# Patient Record
Sex: FEMALE | Race: WHITE | Employment: FULL TIME
[De-identification: names, ages, dates, MRNs, and addresses within clinical notes are randomized per-mention and may not be internally consistent; named-entity substitution may affect disease eponyms.]

---

## 2017-05-26 ENCOUNTER — HEALTH MAINTENANCE LETTER (OUTPATIENT)
Age: 37
End: 2017-05-26

## 2017-10-02 ASSESSMENT — ANXIETY QUESTIONNAIRES
7. FEELING AFRAID AS IF SOMETHING AWFUL MIGHT HAPPEN: NOT AT ALL
GAD7 TOTAL SCORE: 0
2. NOT BEING ABLE TO STOP OR CONTROL WORRYING: NOT AT ALL
5. BEING SO RESTLESS THAT IT IS HARD TO SIT STILL: NOT AT ALL
1. FEELING NERVOUS, ANXIOUS, OR ON EDGE: NOT AT ALL
6. BECOMING EASILY ANNOYED OR IRRITABLE: NOT AT ALL
GAD7 TOTAL SCORE: 0
4. TROUBLE RELAXING: NOT AT ALL
GAD7 TOTAL SCORE: 0
3. WORRYING TOO MUCH ABOUT DIFFERENT THINGS: NOT AT ALL
7. FEELING AFRAID AS IF SOMETHING AWFUL MIGHT HAPPEN: NOT AT ALL

## 2017-10-03 ENCOUNTER — OFFICE VISIT (OUTPATIENT)
Dept: OBGYN | Facility: CLINIC | Age: 37
End: 2017-10-03
Payer: COMMERCIAL

## 2017-10-03 VITALS
BODY MASS INDEX: 28.56 KG/M2 | HEIGHT: 62 IN | HEART RATE: 101 BPM | DIASTOLIC BLOOD PRESSURE: 95 MMHG | SYSTOLIC BLOOD PRESSURE: 158 MMHG | WEIGHT: 155.2 LBS

## 2017-10-03 DIAGNOSIS — N97.9 FEMALE INFERTILITY: ICD-10-CM

## 2017-10-03 DIAGNOSIS — Z12.4 CERVICAL CANCER SCREENING: Primary | ICD-10-CM

## 2017-10-03 DIAGNOSIS — I10 HYPERTENSION, UNSPECIFIED TYPE: ICD-10-CM

## 2017-10-03 PROCEDURE — 83001 ASSAY OF GONADOTROPIN (FSH): CPT | Performed by: STUDENT IN AN ORGANIZED HEALTH CARE EDUCATION/TRAINING PROGRAM

## 2017-10-03 PROCEDURE — G0123 SCREEN CERV/VAG THIN LAYER: HCPCS | Performed by: STUDENT IN AN ORGANIZED HEALTH CARE EDUCATION/TRAINING PROGRAM

## 2017-10-03 PROCEDURE — 87624 HPV HI-RISK TYP POOLED RSLT: CPT | Performed by: STUDENT IN AN ORGANIZED HEALTH CARE EDUCATION/TRAINING PROGRAM

## 2017-10-03 PROCEDURE — 99213 OFFICE O/P EST LOW 20 MIN: CPT | Mod: 25

## 2017-10-03 PROCEDURE — G0145 SCR C/V CYTO,THINLAYER,RESCR: HCPCS | Performed by: STUDENT IN AN ORGANIZED HEALTH CARE EDUCATION/TRAINING PROGRAM

## 2017-10-03 ASSESSMENT — PAIN SCALES - GENERAL: PAINLEVEL: NO PAIN (0)

## 2017-10-03 ASSESSMENT — ANXIETY QUESTIONNAIRES: GAD7 TOTAL SCORE: 0

## 2017-10-03 NOTE — PROGRESS NOTES
"[facility]  Annual Exam    HPI:    Rona Krishnamurthy is a 37 year old G0, here for well woman exam.  She would like to discuss fertility and future pregnancy. She has been having unprotected intercourse with her  for the last 5 years with timed intercourse for the last 5-6 months. She has been using an rand on her phone called \"Ovia\" to time ovulation. She has not tried ovulation predictor kits. She has no other concerns today.     GYN History  - LMP: Patient's last menstrual period was 09/23/2017.  - Menses: Regular monthly cycles  - Pap Smears: Unknown, patient does not remember    No results found for: PAP  - Contraception: none  - Sexual Activity: with her   - Hx STIs: genital wart    OBHx  G0    PMHx: Denies  PSHx: Removal of fibroadenoma from breast  Meds: L-theanine   Allergies:  NKDA    SocHx: Reports THC usage, no tobacco usage. She works as a  for [employer]    ROS: 10-Point ROS negative except as noted in HPI    Preventative Health  Feels safe at home  Feelings of depression: Denies  Seat belt usage: yes  Exercise: trying to exercise more    Physical Exam  BP (!) 158/95  Pulse 101  Ht 1.575 m (5' 2\")  Wt 70.4 kg (155 lb 3.2 oz)  LMP 09/23/2017  Breastfeeding? No  BMI 28.39 kg/m2  Gen: Well-appearing, NAD  HEENT: Normocephalic, atraumatic  Neck: Thyroid is not enlarged, no appreciable masses palpated. Non-tender  CV:  RRR, no m/r/g auscultated  Pulm: CTAB, no w/r/r auscultated  Abd: Soft, non-tender, non-distended  Ext: No LE edema, extremities warm and well perfused    Breast: Symmetric, no nipple discharge or retraction, no axillary lymphadenopathy, no palpable nodules or masses bilaterally    Pelvic:  Normal appearing external female genitalia. Normal hair distribution. Vagina is without lesions. Scant discharge. Cervix nulliparous, no lesions, no cervical motion tenderness. Uterus is small, mobile, non-tender. No adnexal tenderness or masses      --Ideal BMI: " 18.5-24.9  Current BMI: Body mass index is 28.39 kg/(m^2).  --Underweight = <18.5  --Normal weight = 18.5-24.9  --Overweight = 25-29.9  --Obesity = >30    Assessment/Plan  Rona Krishnamurthy is a 37 year old G0 female here for annual exam, infertility and HTN.     Infertility:  - Ordered AMH, Day 3 FSH and estradiol  - Ordered HSG  - Referral to [facility], patient would like to go to [facility] in [place]  - Discussed with patient day 1 of her cycle is the first day of bleeding and she should have her labs drawn on day 3. She should also call the clinic on day 1 to schedule her HSG  - Encouraged patient to use ovulation predictor kits while waiting for evaluation with [facility]    Cervical cancer screening:  - Pap collected today, will contact patient via [employer] with results    HTN:  - patient had two elevated BP in clinic, referral to PCP  - Patient will make appointment with Dr. Todd     Follow Up: as needed    Mónica Jeffery MD,PhD  Ob/Gyn, PGY-2  10/3/2017 3:14 PM    Patient was seen by the resident in [facility].  I reviewed the history & exam.  The patient's assessment and plan were made jointly.    Camila Barrientos MD MPH

## 2017-10-03 NOTE — PATIENT INSTRUCTIONS
Nice to meet you today!  We will contact you with the results from your pap smear.  Please have your FSH and estradiol lab work drawn on day 3 of your next menstrual cycle. Day 1 is the first day of bleeding.   For your HSG, call the clinic on day 1 of your cycle and they will schedule your appropriately.   Please follow up in clinic as needed.

## 2017-10-03 NOTE — MR AVS SNAPSHOT
After Visit Summary   10/3/2017    Rona Krishnamurthy    MRN: [id number]           Patient Information     Date Of Birth          1980        Visit Information        Provider Department      10/3/2017 3:15 PM Mónica Jeffery MD [facility]        Today's Diagnoses     Cervical cancer screening    -  1    Female infertility          Care Instructions    Nice to meet you today!  We will contact you with the results from your pap smear.  Please have your FSH and estradiol lab work drawn on day 3 of your next menstrual cycle. Day 1 is the first day of bleeding.   For your HSG, call the clinic on day 1 of your cycle and they will schedule your appropriately.   Please follow up in clinic as needed.             Follow-ups after your visit        Future tests that were ordered for you today     Open Future Orders        Priority Expected Expires Ordered    XR Hysterosalpingogram Routine 10/3/2017 10/3/2018 10/3/2017            Who to contact     Please call your clinic at [phone] to:    Ask questions about your health    Make or cancel appointments    Discuss your medicines    Learn about your test results    Speak to your doctor   If you have compliments or concerns about an experience at your clinic, or if you wish to file a complaint, please contact [facility] Patient Relations at [phone] or email us at [email or web address]         Additional Information About Your Visit        Footmarkshart Information     Cam[employer] gives you secure access to your electronic health record. If you see a primary care provider, you can also send messages to your care team and make appointments. If you have questions, please call your primary care clinic.  If you do not have a primary care provider, please call [phone] and they will assist you.      Cam[employer] is an electronic gateway that provides easy, online access to your medical records. With  "MyChart, you can request a clinic appointment, read your test results, renew a prescription or communicate with your care team.     To access your existing account, please contact your [facility] or call [phone] for assistance.        Care EveryWhere ID     This is your Care EveryWhere ID. This could be used by other organizations to access your [place] medical records  [id number]        Your Vitals Were     Pulse Height Last Period Breastfeeding? BMI (Body Mass Index)       114 1.575 m (5' 2\") 09/23/2017 No 28.39 kg/m2        Blood Pressure from Last 3 Encounters:   10/03/17 (!) 160/92    Weight from Last 3 Encounters:   10/03/17 70.4 kg (155 lb 3.2 oz)              We Performed the Following     Anti-Mullerian hormone     Estradiol     Follicle Stimulating Hormone     HPV High Risk Types DNA Cervical     Obtaining, preparing and conveyance of cervical or vaginal smear to laboratory.     Pap imaged thin layer screen with HPV - recommended age 30 - 65 years (select HPV order below)        Primary Care Provider    None Specified       No primary provider on file.        Equal Access to Services     BERT ACUNA : Hadeusebio Prabhakar, waaxda luángel, qaybta kaalaliyah forte, kim romero . So [facility] [phone].    ATENCIÓN: Si habla español, tiene a trevizo disposición servicios gratuitos de asistencia lingüística. Llame al [phone].    We comply with applicable federal civil rights laws and [place] laws. We do not discriminate on the basis of race, color, national origin, age, disability, sex, sexual orientation, or gender identity.            Thank you!     Thank you for choosing [facility]  for your care. Our goal is always to provide you with excellent care. Hearing back from our patients is one way we can continue to improve our services. Please take a few minutes to complete the written survey that you may " receive in the mail after your visit with us. Thank you!             Your Updated Medication List - Protect others around you: Learn how to safely use, store and throw away your medicines at www.disposemymeds.org.      Notice  As of 10/3/2017  4:03 PM    You have not been prescribed any medications.

## 2017-10-06 LAB
COPATH REPORT: NORMAL
PAP: NORMAL

## 2017-10-10 LAB
FINAL DIAGNOSIS: NORMAL
HPV HR 12 DNA CVX QL NAA+PROBE: NEGATIVE
HPV16 DNA SPEC QL NAA+PROBE: NEGATIVE
HPV18 DNA SPEC QL NAA+PROBE: NEGATIVE
SPECIMEN DESCRIPTION: NORMAL

## 2017-10-15 ASSESSMENT — ANXIETY QUESTIONNAIRES
6. BECOMING EASILY ANNOYED OR IRRITABLE: NOT AT ALL
4. TROUBLE RELAXING: NOT AT ALL
GAD7 TOTAL SCORE: 2
7. FEELING AFRAID AS IF SOMETHING AWFUL MIGHT HAPPEN: NOT AT ALL
5. BEING SO RESTLESS THAT IT IS HARD TO SIT STILL: NOT AT ALL
3. WORRYING TOO MUCH ABOUT DIFFERENT THINGS: SEVERAL DAYS
2. NOT BEING ABLE TO STOP OR CONTROL WORRYING: NOT AT ALL
7. FEELING AFRAID AS IF SOMETHING AWFUL MIGHT HAPPEN: NOT AT ALL
GAD7 TOTAL SCORE: 2
1. FEELING NERVOUS, ANXIOUS, OR ON EDGE: SEVERAL DAYS
GAD7 TOTAL SCORE: 2

## 2017-10-16 ASSESSMENT — ANXIETY QUESTIONNAIRES: GAD7 TOTAL SCORE: 2

## 2017-10-18 ENCOUNTER — OFFICE VISIT (OUTPATIENT)
Dept: INTERNAL MEDICINE | Facility: CLINIC | Age: 37
End: 2017-10-18
Attending: INTERNAL MEDICINE
Payer: COMMERCIAL

## 2017-10-18 VITALS
HEIGHT: 62 IN | WEIGHT: 153 LBS | SYSTOLIC BLOOD PRESSURE: 121 MMHG | BODY MASS INDEX: 28.16 KG/M2 | DIASTOLIC BLOOD PRESSURE: 86 MMHG

## 2017-10-18 DIAGNOSIS — Z23 NEED FOR IMMUNIZATION AGAINST INFLUENZA: ICD-10-CM

## 2017-10-18 DIAGNOSIS — R03.0 ELEVATED BLOOD PRESSURE READING WITHOUT DIAGNOSIS OF HYPERTENSION: Primary | ICD-10-CM

## 2017-10-18 DIAGNOSIS — N92.6 MISSED PERIODS: ICD-10-CM

## 2017-10-18 LAB
HCG UR QL: NEGATIVE
INTERNAL QC OK POCT: YES

## 2017-10-18 PROCEDURE — 90686 IIV4 VACC NO PRSV 0.5 ML IM: CPT | Mod: ZF

## 2017-10-18 PROCEDURE — 25000128 H RX IP 250 OP 636: Mod: ZF

## 2017-10-18 PROCEDURE — 81025 URINE PREGNANCY TEST: CPT | Mod: ZF | Performed by: INTERNAL MEDICINE

## 2017-10-18 PROCEDURE — 99211 OFF/OP EST MAY X REQ PHY/QHP: CPT | Mod: ZF

## 2017-10-18 PROCEDURE — G0008 ADMIN INFLUENZA VIRUS VAC: HCPCS

## 2017-10-18 PROCEDURE — 90471 IMMUNIZATION ADMIN: CPT | Mod: ZF

## 2017-10-18 ASSESSMENT — PAIN SCALES - GENERAL: PAINLEVEL: NO PAIN (0)

## 2017-10-18 NOTE — NURSING NOTE
Chief Complaint   Patient presents with     Consult   establish care for blood pressure check-  Has been monitoring at home   Lowest 135-82 in am     Over ones 149/90

## 2017-10-18 NOTE — NURSING NOTE
"Injectable Influenza Immunization Documentation    1.  Has the patient received the information for the injectable influenza vaccine? YES     2. Is the patient 6 months of age or older? YES     3. Does the patient have any of the following contraindications?         Severe allergy to eggs? No     Severe allergic reaction to previous influenza vaccines? No   Severe allergy to latex? No       History of Guillain-Wilton syndrome? No     Currently have a temperature greater than 100.4F? No        4.  Severely egg allergic patients should have flu vaccine eligibility assessed by an MD, RN, or pharmacist, and those who received flu vaccine should be observed for 15 min by an MD, RN, Pharmacist, Medical Technician, or member of clinic staff.\": YES    5. Latex-allergic patients should be given latex-free influenza vaccine Yes. Please reference the Vaccine latex table to determine if your clinic s product is latex-containing.       Vaccination given by Tegan Conklin LPN      "

## 2017-10-18 NOTE — LETTER
10/18/2017       RE: Rona Krishnamurthy  [address] [facility] [address]     Dear Colleague,    Thank you for referring your patient, Rona Krishnamurthy, to the [facility]  at [facility]. Please see a copy of my visit note below.    HPI  Patient is here to discuss elevated blood pressure. She states that she was seen for her annual visit recently and was found to be hypertensive.  She is considering pregnancy in the future.   Patient reports that her mom has hypertension. She denies snoring. She has been walking occasionally. She does not smoke tobacco. She reports stress at home.     Review of Systems     Constitutional:  Negative for fever, chills, weight loss, weight gain, fatigue, decreased appetite, night sweats, recent stressors, height gain, height loss, post-operative complications, incisional pain, hallucinations, increased energy, hyperactivity and confused.   HENT:  Negative for ear pain, hearing loss, tinnitus, nosebleeds, trouble swallowing, hoarse voice, mouth sores, sore throat, ear discharge, tooth pain, gum tenderness, taste disturbance, smell disturbance, hearing aid, bleeding gums, dry mouth, sinus pain, sinus congestion and neck mass.    Eyes:  Negative for double vision, pain, redness, eye pain, decreased vision, eye watering, eye bulging, eye dryness, flashing lights, spots, floaters, strabismus, tunnel vision, jaundice and eye irritation.   Respiratory:   Negative for cough, hemoptysis, sputum production, shortness of breath, wheezing, sleep disturbances due to breathing, snores loudly, respiratory pain, dyspnea on exertion, cough disturbing sleep and postural dyspnea.    Cardiovascular:  Negative for chest pain, dyspnea on exertion, palpitations, orthopnea, claudication, leg swelling, fingers/toes turn blue, hypertension, hypotension, syncope, history of heart murmur, chest pain on exertion, chest pain at rest, pacemaker,  few scattered varicosities, leg pain, sleep disturbances due to breathing, tachycardia, light-headedness, exercise intolerance and edema.   Gastrointestinal:  Negative for heartburn, nausea, vomiting, abdominal pain, diarrhea, constipation, blood in stool, melena, rectal pain, bloating, hemorrhoids, bowel incontinence, jaundice, rectal bleeding, coffee ground emesis and change in stool.   Genitourinary:  Negative for bladder incontinence, dysuria, urgency, hematuria, flank pain, vaginal discharge, difficulty urinating, genital sores, dyspareunia, decreased libido, nocturia, voiding less frequently, arousal difficulty, abnormal vaginal bleeding, excessive menstruation, menstrual changes, hot flashes, vaginal dryness and postmenopausal bleeding.   Musculoskeletal:  Negative for myalgias, back pain, joint swelling, arthralgias, stiffness, muscle cramps, neck pain, bone pain, muscle weakness and fracture.   Skin:  Negative for nail changes, itching, poor wound healing, rash, hair changes, skin changes, acne, warts, poor wound healing, scarring, flaky skin, Raynaud's phenomenon, sensitivity to sunlight and skin thickening.   Neurological:  Negative for dizziness, tingling, tremors, speech change, seizures, loss of consciousness, weakness, light-headedness, numbness, headaches, disturbances in coordination, extremity numbness, memory loss, difficulty walking and paralysis.   Endo/Heme:  Negative for anemia, swollen glands and bruises/bleeds easily.   Psychiatric/Behavioral:  Negative for depression, hallucinations, memory loss, decreased concentration, mood swings and panic attacks.    Breast:  Negative for breast discharge, breast mass, breast pain and nipple retraction.   Endocrine:  Negative for altered temperature regulation, polyphagia, polydipsia, unwanted hair growth and change in facial hair.    Current Outpatient Prescriptions   Medication     L-THEANINE PO     doxylamine (UNISOM) 25 MG TABS tablet     No current  "facility-administered medications for this visit.      Past Medical History:   Diagnosis Date     Atopic dermatitis 11/16/2005     Past Surgical History:   Procedure Laterality Date     BREAST SURGERY  06/01/1998    fibroadenoma removal     Family History   Problem Relation Age of Onset     Breast Cancer Other      Aunt - Dad's sister     Breast Cancer Other      Mom's grandma     Prostate Cancer Father      Hypertension Mother      Social History     Social History     Marital status:      Spouse name: Tj Krishnamurthy     Number of children: N/A     Years of education: N/A     Occupational History     Not on file.     Social History Main Topics     Smoking status: Never Smoker     Smokeless tobacco: Never Used     Alcohol use No     Drug use: Yes     Special: Marijuana      Comment: Ocassional     Sexual activity: Yes     Partners: Male     Birth control/ protection: None     Other Topics Concern     Not on file     Social History Narrative    ** Merged History Encounter **            Vitals:    10/18/17 1630 10/18/17 1644 10/18/17 1645   BP: 131/85 128/87 121/86   Weight: 69.4 kg (153 lb)     Height: 1.575 m (5' 2\")         Physical Exam   Constitutional: She is oriented to person, place, and time and well-developed, well-nourished, and in no distress.   HENT:   Head: Normocephalic and atraumatic.   Mouth/Throat: Oropharynx is clear and moist.   Eyes: Conjunctivae are normal. Pupils are equal, round, and reactive to light.   Neck: Normal range of motion.   Cardiovascular: Normal rate and regular rhythm.    Pulmonary/Chest: Effort normal and breath sounds normal.   Musculoskeletal: She exhibits no edema.   Neurological: She is alert and oriented to person, place, and time.   Skin: Skin is warm and dry.   Psychiatric: Mood, memory and judgment normal.   Vitals reviewed.    Assessment and Plan:  Rona was seen today for consult.    Diagnoses and all orders for this visit:    Elevated blood pressure reading " without diagnosis of hypertension. Blood pressure is within acceptable range today. Given patient's blood pressure readings at home, recommend further evaluation with 24 hour blood pressure monitor. Will follow up following the completion of the study. Patient is in agreement with the plan.   -     24 Hour Blood Pressure Monitor - Adult  -     Cancel: Basic Metabolic Panel  -     Cancel: TSH with free T4 reflex  -     Cancel: CBC with Platelets  -     Cancel: Protein albumin urine  -     Cancel: *UA reflex to Microscopic and Culture ([facility])  -     Basic Metabolic Panel; Future  -     TSH with free T4 reflex; Future  -     CBC with Platelets; Future  -     Protein albumin urine; Future  -     *UA reflex to Microscopic and Culture ([facility]); Future    Need for immunization against influenza  -     HC FLU VAC PRESRV FREE QUAD SPLIT VIR 3+YRS IM    Missed periods. Patient is not using contraception. She is concerned about impact of pregnancy on her stress level. She is open to counseling and will look further into mental health coverage with her insurance. Pregnancy test was negative today.   -     hCG qual urine POCT      Total time spent 45  minutes.  More than 50% of the time spent with Ms. Krishnamurthy on counseling / coordinating her care    Again, thank you for allowing me to participate in the care of your patient.      Sincerely,    Torri Todd MD

## 2017-10-18 NOTE — MR AVS SNAPSHOT
After Visit Summary   10/18/2017    Rona Krishnamurthy    MRN: [id number]           Patient Information     Date Of Birth          1980        Visit Information        Provider Department      10/18/2017 4:20 PM Torri Todd MD [facility]         Today's Diagnoses     Elevated blood pressure reading without diagnosis of hypertension    -  1    Need for immunization against influenza        Missed periods           Follow-ups after your visit        Future tests that were ordered for you today     Open Future Orders        Priority Expected Expires Ordered    Basic Metabolic Panel Routine  10/18/2018 10/18/2017    TSH with free T4 reflex Routine  10/18/2018 10/18/2017    CBC with Platelets Routine  10/18/2018 10/18/2017    Protein albumin urine Routine  10/18/2018 10/18/2017    *UA reflex to Microscopic and Culture ([facility]) Routine  10/18/2018 10/18/2017            Who to contact     Please call your clinic at [phone] to:    Ask questions about your health    Make or cancel appointments    Discuss your medicines    Learn about your test results    Speak to your doctor   If you have compliments or concerns about an experience at your clinic, or if you wish to file a complaint, please contact [facility] Patient Relations at [phone] or email us at [email or web address]         Additional Information About Your Visit        MyChart Information     [employer] gives you secure access to your electronic health record. If you see a primary care provider, you can also send messages to your care team and make appointments. If you have questions, please call your primary care clinic.  If you do not have a primary care provider, please call [phone] and they will assist you.      [employer] is an electronic gateway that provides easy, online access to your medical records. With [employer], you can request a clinic appointment,  "read your test results, renew a prescription or communicate with your care team.     To access your existing account, please contact your [facility] or call [phone] for assistance.        Care EveryWhere ID     This is your Care EveryWhere ID. This could be used by other organizations to access your [place] medical records  [id number]        Your Vitals Were     Height Last Period BMI (Body Mass Index)             1.575 m (5' 2\") 09/23/2017 27.98 kg/m2          Blood Pressure from Last 3 Encounters:   10/18/17 121/86   10/03/17 (!) 158/95    Weight from Last 3 Encounters:   10/18/17 69.4 kg (153 lb)   10/03/17 70.4 kg (155 lb 3.2 oz)              We Performed the Following     24 Hour Blood Pressure Monitor - Adult     HC FLU VAC PRESRV FREE QUAD SPLIT VIR 3+YRS IM     hCG qual urine POCT          Today's Medication Changes          These changes are accurate as of: 10/18/17  5:36 PM.  If you have any questions, ask your nurse or doctor.               Stop taking these medicines if you haven't already. Please contact your care team if you have questions.     glycopyrrolate 2 MG Tabs   Stopped by:  Torir Todd MD           ROBINUL-FORTE 2 MG Tabs   Generic drug:  glycopyrrolate   Stopped by:  Torri Todd MD           JOLANTA PO   Stopped by:  oTrri Todd MD                    Primary Care Provider Office Phone # Fax #    Mamadou Canales [phone]       [address] [place], [place] [address]DELORES BLANCHARD [facility] [address]        Equal Access to Services     [facility]: Hadii aad ku hadasho Soomaali, waaxda luqadaha, qaybta kaalmada praneethegamirah, kim romero . So [place] [phone].    ATENCIÓN: Si habla español, tiene a trevizo disposición servicios gratuitos de asistencia lingüística. Llame al [phone].    We comply with applicable federal civil rights laws and [place] laws. We do not discriminate on the basis of race, color, " national origin, age, disability, sex, sexual orientation, or gender identity.            Thank you!     Thank you for choosing [facility]   for your care. Our goal is always to provide you with excellent care. Hearing back from our patients is one way we can continue to improve our services. Please take a few minutes to complete the written survey that you may receive in the mail after your visit with us. Thank you!             Your Updated Medication List - Protect others around you: Learn how to safely use, store and throw away your medicines at www.disposemymeds.org.          This list is accurate as of: 10/18/17  5:36 PM.  Always use your most recent med list.                   Brand Name Dispense Instructions for use Diagnosis    doxylamine 25 MG Tabs tablet    UNISOM     Take 25 mg by mouth At Bedtime        L-THEANINE PO      Take 200 mg by mouth daily

## 2017-10-18 NOTE — PROGRESS NOTES
HPI  Patient is here to discuss elevated blood pressure. She states that she was seen for her annual visit recently and was found to be hypertensive.  She is considering pregnancy in the future.   Patient reports that her mom has hypertension. She denies snoring. She has been walking occasionally. She does not smoke tobacco. She reports stress at home.     Review of Systems     Constitutional:  Negative for fever, chills, weight loss, weight gain, fatigue, decreased appetite, night sweats, recent stressors, height gain, height loss, post-operative complications, incisional pain, hallucinations, increased energy, hyperactivity and confused.   HENT:  Negative for ear pain, hearing loss, tinnitus, nosebleeds, trouble swallowing, hoarse voice, mouth sores, sore throat, ear discharge, tooth pain, gum tenderness, taste disturbance, smell disturbance, hearing aid, bleeding gums, dry mouth, sinus pain, sinus congestion and neck mass.    Eyes:  Negative for double vision, pain, redness, eye pain, decreased vision, eye watering, eye bulging, eye dryness, flashing lights, spots, floaters, strabismus, tunnel vision, jaundice and eye irritation.   Respiratory:   Negative for cough, hemoptysis, sputum production, shortness of breath, wheezing, sleep disturbances due to breathing, snores loudly, respiratory pain, dyspnea on exertion, cough disturbing sleep and postural dyspnea.    Cardiovascular:  Negative for chest pain, dyspnea on exertion, palpitations, orthopnea, claudication, leg swelling, fingers/toes turn blue, hypertension, hypotension, syncope, history of heart murmur, chest pain on exertion, chest pain at rest, pacemaker, few scattered varicosities, leg pain, sleep disturbances due to breathing, tachycardia, light-headedness, exercise intolerance and edema.   Gastrointestinal:  Negative for heartburn, nausea, vomiting, abdominal pain, diarrhea, constipation, blood in stool, melena, rectal pain, bloating, hemorrhoids,  bowel incontinence, jaundice, rectal bleeding, coffee ground emesis and change in stool.   Genitourinary:  Negative for bladder incontinence, dysuria, urgency, hematuria, flank pain, vaginal discharge, difficulty urinating, genital sores, dyspareunia, decreased libido, nocturia, voiding less frequently, arousal difficulty, abnormal vaginal bleeding, excessive menstruation, menstrual changes, hot flashes, vaginal dryness and postmenopausal bleeding.   Musculoskeletal:  Negative for myalgias, back pain, joint swelling, arthralgias, stiffness, muscle cramps, neck pain, bone pain, muscle weakness and fracture.   Skin:  Negative for nail changes, itching, poor wound healing, rash, hair changes, skin changes, acne, warts, poor wound healing, scarring, flaky skin, Raynaud's phenomenon, sensitivity to sunlight and skin thickening.   Neurological:  Negative for dizziness, tingling, tremors, speech change, seizures, loss of consciousness, weakness, light-headedness, numbness, headaches, disturbances in coordination, extremity numbness, memory loss, difficulty walking and paralysis.   Endo/Heme:  Negative for anemia, swollen glands and bruises/bleeds easily.   Psychiatric/Behavioral:  Negative for depression, hallucinations, memory loss, decreased concentration, mood swings and panic attacks.    Breast:  Negative for breast discharge, breast mass, breast pain and nipple retraction.   Endocrine:  Negative for altered temperature regulation, polyphagia, polydipsia, unwanted hair growth and change in facial hair.    Current Outpatient Prescriptions   Medication     L-THEANINE PO     doxylamine (UNISOM) 25 MG TABS tablet     No current facility-administered medications for this visit.      Past Medical History:   Diagnosis Date     Atopic dermatitis 11/16/2005     Past Surgical History:   Procedure Laterality Date     BREAST SURGERY  06/01/1998    fibroadenoma removal     Family History   Problem Relation Age of Onset     Breast  "Cancer Other      Aunt - Dad's sister     Breast Cancer Other      Mom's grandma     Prostate Cancer Father      Hypertension Mother      Social History     Social History     Marital status:      Spouse name: Tj Krishnamurthy     Number of children: N/A     Years of education: N/A     Occupational History     Not on file.     Social History Main Topics     Smoking status: Never Smoker     Smokeless tobacco: Never Used     Alcohol use No     Drug use: Yes     Special: Marijuana      Comment: Ocassional     Sexual activity: Yes     Partners: Male     Birth control/ protection: None     Other Topics Concern     Not on file     Social History Narrative    ** Merged History Encounter **            Vitals:    10/18/17 1630 10/18/17 1644 10/18/17 1645   BP: 131/85 128/87 121/86   Weight: 69.4 kg (153 lb)     Height: 1.575 m (5' 2\")         Physical Exam   Constitutional: She is oriented to person, place, and time and well-developed, well-nourished, and in no distress.   HENT:   Head: Normocephalic and atraumatic.   Mouth/Throat: Oropharynx is clear and moist.   Eyes: Conjunctivae are normal. Pupils are equal, round, and reactive to light.   Neck: Normal range of motion.   Cardiovascular: Normal rate and regular rhythm.    Pulmonary/Chest: Effort normal and breath sounds normal.   Musculoskeletal: She exhibits no edema.   Neurological: She is alert and oriented to person, place, and time.   Skin: Skin is warm and dry.   Psychiatric: Mood, memory and judgment normal.   Vitals reviewed.    Assessment and Plan:  Rona was seen today for consult.    Diagnoses and all orders for this visit:    Elevated blood pressure reading without diagnosis of hypertension. Blood pressure is within acceptable range today. Given patient's blood pressure readings at home, recommend further evaluation with 24 hour blood pressure monitor. Will follow up following the completion of the study. Patient is in agreement with the plan.   -     " 24 Hour Blood Pressure Monitor - Adult  -     Cancel: Basic Metabolic Panel  -     Cancel: TSH with free T4 reflex  -     Cancel: CBC with Platelets  -     Cancel: Protein albumin urine  -     Cancel: *UA reflex to Microscopic and Culture ([facility])  -     Basic Metabolic Panel; Future  -     TSH with free T4 reflex; Future  -     CBC with Platelets; Future  -     Protein albumin urine; Future  -     *UA reflex to Microscopic and Culture ([facility]); Future    Need for immunization against influenza  -     HC FLU VAC PRESRV FREE QUAD SPLIT VIR 3+YRS IM    Missed periods. Patient is not using contraception. She is concerned about impact of pregnancy on her stress level. She is open to counseling and will look further into mental health coverage with her insurance. Pregnancy test was negative today.   -     hCG qual urine POCT      Total time spent 45  minutes.  More than 50% of the time spent with Ms. Krishnamurthy on counseling / coordinating her care    Torri Todd MD

## 2017-10-21 ASSESSMENT — ENCOUNTER SYMPTOMS
NECK MASS: 0
DECREASED LIBIDO: 0
INCREASED ENERGY: 0
PALPITATIONS: 0
MUSCLE CRAMPS: 0
STIFFNESS: 0
BRUISES/BLEEDS EASILY: 0
ARTHRALGIAS: 0
NIGHT SWEATS: 0
VOMITING: 0
NUMBNESS: 0
BACK PAIN: 0
DYSPNEA ON EXERTION: 0
CHILLS: 0
DIARRHEA: 0
DEPRESSION: 0
PARALYSIS: 0
COUGH DISTURBING SLEEP: 0
HALLUCINATIONS: 0
HEADACHES: 0
HYPERTENSION: 0
DISTURBANCES IN COORDINATION: 0
CLAUDICATION: 0
NAIL CHANGES: 0
POLYDIPSIA: 0
SHORTNESS OF BREATH: 0
HEMATURIA: 0
SINUS CONGESTION: 0
BLOATING: 0
HOT FLASHES: 0
TACHYCARDIA: 0
HEARTBURN: 0
POSTURAL DYSPNEA: 0
EYE REDNESS: 0
WEIGHT LOSS: 0
SINUS PAIN: 0
BLOOD IN STOOL: 0
MEMORY LOSS: 0
HYPOTENSION: 0
HOARSE VOICE: 0
LOSS OF CONSCIOUSNESS: 0
NERVOUS/ANXIOUS: 0
RECTAL BLEEDING: 0
LEG SWELLING: 0
WEAKNESS: 0
DIFFICULTY URINATING: 0
PANIC: 0
INSOMNIA: 0
DOUBLE VISION: 0
EXERCISE INTOLERANCE: 0
MYALGIAS: 0
EYE IRRITATION: 0
SWOLLEN GLANDS: 0
RECTAL PAIN: 0
SYNCOPE: 0
ALTERED TEMPERATURE REGULATION: 0
WHEEZING: 0
BREAST PAIN: 0
EXTREMITY NUMBNESS: 0
BREAST MASS: 0
DECREASED APPETITE: 0
POLYPHAGIA: 0
FEVER: 0
EYE PAIN: 0
DECREASED CONCENTRATION: 0
SMELL DISTURBANCE: 0
LIGHT-HEADEDNESS: 0
NECK PAIN: 0
DIZZINESS: 0
SORE THROAT: 0
TASTE DISTURBANCE: 0
MUSCLE WEAKNESS: 0
JAUNDICE: 0
ORTHOPNEA: 0
TREMORS: 0
HEMOPTYSIS: 0
COUGH: 0
BOWEL INCONTINENCE: 0
RESPIRATORY PAIN: 0
TINGLING: 0
NAUSEA: 0
TROUBLE SWALLOWING: 0
CONSTIPATION: 0
JOINT SWELLING: 0
LEG PAIN: 0
SPUTUM PRODUCTION: 0
FLANK PAIN: 0
POOR WOUND HEALING: 0
DYSURIA: 0
WEIGHT GAIN: 0
EYE WATERING: 0
SLEEP DISTURBANCES DUE TO BREATHING: 0
SPEECH CHANGE: 0
SEIZURES: 0
SKIN CHANGES: 0
FATIGUE: 0
ABDOMINAL PAIN: 0
SNORES LOUDLY: 0

## 2017-10-25 ENCOUNTER — MYC MEDICAL ADVICE (OUTPATIENT)
Dept: INTERNAL MEDICINE | Facility: CLINIC | Age: 37
End: 2017-10-25

## 2017-10-25 DIAGNOSIS — R03.0 ELEVATED BLOOD PRESSURE READING WITHOUT DIAGNOSIS OF HYPERTENSION: Primary | ICD-10-CM

## 2017-10-25 DIAGNOSIS — O09.521 ELDERLY MULTIGRAVIDA IN FIRST TRIMESTER: Primary | ICD-10-CM

## 2017-10-25 NOTE — TELEPHONE ENCOUNTER
"Order for 24 hour bp monitoring was accidentally marked as \"complete\". Nurse re-ordered and responded to patient via mychart.   "

## 2017-10-26 ENCOUNTER — HOSPITAL ENCOUNTER (OUTPATIENT)
Dept: CARDIOLOGY | Facility: CLINIC | Age: 37
Discharge: HOME | End: 2017-10-26
Attending: INTERNAL MEDICINE | Admitting: INTERNAL MEDICINE
Payer: COMMERCIAL

## 2017-10-26 DIAGNOSIS — N97.9 FEMALE INFERTILITY: ICD-10-CM

## 2017-10-26 DIAGNOSIS — R03.0 ELEVATED BLOOD PRESSURE READING WITHOUT DIAGNOSIS OF HYPERTENSION: ICD-10-CM

## 2017-10-26 LAB
ANION GAP SERPL CALCULATED.3IONS-SCNC: 8 MMOL/L (ref 3–14)
BUN SERPL-MCNC: 6 MG/DL (ref 7–30)
CALCIUM SERPL-MCNC: 9 MG/DL (ref 8.5–10.1)
CHLORIDE SERPL-SCNC: 105 MMOL/L (ref 94–109)
CO2 SERPL-SCNC: 28 MMOL/L (ref 20–32)
CREAT SERPL-MCNC: 0.78 MG/DL (ref 0.52–1.04)
ESTRADIOL SERPL-MCNC: 18 PG/ML
FSH SERPL-ACNC: 22.1 IU/L
GFR SERPL CREATININE-BSD FRML MDRD: 83 ML/MIN/1.7M2
GLUCOSE SERPL-MCNC: 86 MG/DL (ref 70–99)
POTASSIUM SERPL-SCNC: 3.5 MMOL/L (ref 3.4–5.3)
SODIUM SERPL-SCNC: 141 MMOL/L (ref 133–144)
TSH SERPL DL<=0.005 MIU/L-ACNC: 2.61 MU/L (ref 0.4–4)

## 2017-10-26 PROCEDURE — 83520 IMMUNOASSAY QUANT NOS NONAB: CPT | Performed by: STUDENT IN AN ORGANIZED HEALTH CARE EDUCATION/TRAINING PROGRAM

## 2017-10-26 PROCEDURE — 93790 AMBL BP MNTR W/SW I&R: CPT | Performed by: INTERNAL MEDICINE

## 2017-10-26 PROCEDURE — 83001 ASSAY OF GONADOTROPIN (FSH): CPT | Performed by: STUDENT IN AN ORGANIZED HEALTH CARE EDUCATION/TRAINING PROGRAM

## 2017-10-26 PROCEDURE — 82670 ASSAY OF TOTAL ESTRADIOL: CPT | Performed by: STUDENT IN AN ORGANIZED HEALTH CARE EDUCATION/TRAINING PROGRAM

## 2017-10-26 PROCEDURE — 93788 AMBL BP MNTR W/SW A/R: CPT | Performed by: INTERNAL MEDICINE

## 2017-10-26 PROCEDURE — 80048 BASIC METABOLIC PNL TOTAL CA: CPT | Performed by: STUDENT IN AN ORGANIZED HEALTH CARE EDUCATION/TRAINING PROGRAM

## 2017-10-26 PROCEDURE — 36415 COLL VENOUS BLD VENIPUNCTURE: CPT | Performed by: STUDENT IN AN ORGANIZED HEALTH CARE EDUCATION/TRAINING PROGRAM

## 2017-10-26 PROCEDURE — 84443 ASSAY THYROID STIM HORMONE: CPT | Performed by: STUDENT IN AN ORGANIZED HEALTH CARE EDUCATION/TRAINING PROGRAM

## 2017-10-27 LAB
ERYTHROCYTE [DISTWIDTH] IN BLOOD BY AUTOMATED COUNT: NORMAL % (ref 10–15)
HCT VFR BLD AUTO: NORMAL % (ref 35–47)
HGB BLD-MCNC: NORMAL G/DL (ref 11.7–15.7)
MCH RBC QN AUTO: NORMAL PG (ref 26.5–33)
MCHC RBC AUTO-ENTMCNC: NORMAL G/DL (ref 31.5–36.5)
MCV RBC AUTO: NORMAL FL (ref 78–100)
PLATELET # BLD AUTO: NORMAL 10E9/L (ref 150–450)
RBC # BLD AUTO: NORMAL 10E12/L (ref 3.8–5.2)
WBC # BLD AUTO: NORMAL 10E9/L (ref 4–11)

## 2017-10-29 LAB — MIS SERPL-MCNC: 0.02 NG/ML (ref 0.18–11.71)

## 2017-10-30 ENCOUNTER — ALLIED HEALTH/NURSE VISIT (OUTPATIENT)
Dept: OBGYN | Facility: CLINIC | Age: 37
End: 2017-10-30
Payer: COMMERCIAL

## 2017-10-30 ENCOUNTER — HOSPITAL ENCOUNTER (OUTPATIENT)
Dept: GENERAL RADIOLOGY | Facility: CLINIC | Age: 37
Discharge: HOME | End: 2017-10-30
Admitting: OBSTETRICS & GYNECOLOGY
Payer: COMMERCIAL

## 2017-10-30 DIAGNOSIS — Z01.812 PRE-PROCEDURE LAB EXAM: Primary | ICD-10-CM

## 2017-10-30 DIAGNOSIS — N97.9 FEMALE INFERTILITY: ICD-10-CM

## 2017-10-30 LAB
HCG UR QL: NEGATIVE
INTERNAL QC OK POCT: YES

## 2017-10-30 PROCEDURE — 74740 X-RAY FEMALE GENITAL TRACT: CPT

## 2017-10-30 PROCEDURE — 25000125 ZZHC RX 250: Performed by: OBSTETRICS & GYNECOLOGY

## 2017-10-30 PROCEDURE — 25500064 ZZH RX 255 OP 636: Performed by: OBSTETRICS & GYNECOLOGY

## 2017-10-30 PROCEDURE — 81025 URINE PREGNANCY TEST: CPT | Mod: ZF

## 2017-10-30 RX ORDER — IOPAMIDOL 510 MG/ML
50 INJECTION, SOLUTION INTRAVASCULAR ONCE
Status: COMPLETED | OUTPATIENT
Start: 2017-10-30 | End: 2017-10-30

## 2017-10-30 RX ADMIN — LIDOCAINE HYDROCHLORIDE 5 ML: 10 INJECTION, SOLUTION EPIDURAL; INFILTRATION; INTRACAUDAL; PERINEURAL at 14:25

## 2017-10-30 RX ADMIN — IOPAMIDOL 10 ML: 510 INJECTION, SOLUTION INTRAVASCULAR at 14:24

## 2017-10-30 NOTE — PROCEDURES
HSG Procedure    Indications: Assess tubal patency     Pre procedure Diagnosis: Infertility  Post procedure Diagnosis: Same, patent bilateral fallopian tubes    UPT: negative    Procedure: hysterosalpingogram  Anesthesia: 1 % lidocaine  EBL: 3 mL  Total Omnipaque: 40 cc    Procedure: With her permission, after explaining the procedure, the patient was placed in the dorsal lithotomy position.  A medium Graves speculum was inserted.  The cervix was visualized and cleansed with hibicleanse solution x 3.  1 mL of 1% lidocaine was injected at 12 o' clock.  A single tooth tenaculum was placed at 12 o' clock. The catheter was inserted to the internal os.      HSG was completed with findings of normal shaped cavity and bilateral tubal fill and spill.      See radiology report for details.    All instruments were removed from the cervix.   Hemostasis observed.     Humera Leslie MD  [facility]  OB/GYN    10/30/2017  3:14 PM

## 2017-10-30 NOTE — MR AVS SNAPSHOT
After Visit Summary   10/30/2017    Rona Krishnamurthy    MRN: [id number]           Patient Information     Date Of Birth          1980        Visit Information        Provider Department      10/30/2017 1:30 PM Nurse, [facility]        Today's Diagnoses     Pre-procedure lab exam    -  1       Follow-ups after your visit        Who to contact     Please call your clinic at [phone] to:    Ask questions about your health    Make or cancel appointments    Discuss your medicines    Learn about your test results    Speak to your doctor   If you have compliments or concerns about an experience at your clinic, or if you wish to file a complaint, please contact [facility] Patient Relations at [phone] or email us at [email or web address]         Additional Information About Your Visit        MyChart Information     [employer] gives you secure access to your electronic health record. If you see a primary care provider, you can also send messages to your care team and make appointments. If you have questions, please call your primary care clinic.  If you do not have a primary care provider, please call [phone] and they will assist you.      [employer] is an electronic gateway that provides easy, online access to your medical records. With [employer], you can request a clinic appointment, read your test results, renew a prescription or communicate with your care team.     To access your existing account, please contact your [facility] or call [phone] for assistance.        Care EveryWhere ID     This is your Care EveryWhere ID. This could be used by other organizations to access your [place] medical records  [id number]        Your Vitals Were     Last Period                   09/23/2017            Blood Pressure from Last 3 Encounters:   10/18/17 121/86   10/03/17 (!) 158/95    Weight from Last 3  Encounters:   10/18/17 69.4 kg (153 lb)   10/03/17 70.4 kg (155 lb 3.2 oz)              We Performed the Following     hCG qual urine POCT        Primary Care Provider Office Phone # Fax #    Mamadou Canales [phone]       [address]  [place] [facility] [address]        Equal Access to Services     [facility]: Hadii aad ku hadasho Soomaali, waaxda luqadaha, qaybta kaalmada adeegyada, waxay idiin hayaan adeeg kharash la'aan . So [facility] [phone].    ATENCIÓN: Si habla español, tiene a trevizo disposición servicios gratuitos de asistencia lingüística. Llame al [phone].    We comply with applicable federal civil rights laws and [place] laws. We do not discriminate on the basis of race, color, national origin, age, disability, sex, sexual orientation, or gender identity.            Thank you!     Thank you for choosing [facility]  for your care. Our goal is always to provide you with excellent care. Hearing back from our patients is one way we can continue to improve our services. Please take a few minutes to complete the written survey that you may receive in the mail after your visit with us. Thank you!             Your Updated Medication List - Protect others around you: Learn how to safely use, store and throw away your medicines at www.disposemymeds.org.          This list is accurate as of: 10/30/17 11:59 PM.  Always use your most recent med list.                   Brand Name Dispense Instructions for use Diagnosis    doxylamine 25 MG Tabs tablet    UNISOM     Take 25 mg by mouth At Bedtime        L-THEANINE PO      Take 200 mg by mouth daily

## 2017-12-04 ENCOUNTER — TELEPHONE (OUTPATIENT)
Dept: OBGYN | Facility: CLINIC | Age: 37
End: 2017-12-04

## 2017-12-04 DIAGNOSIS — O09.521 ELDERLY MULTIGRAVIDA IN FIRST TRIMESTER: Primary | ICD-10-CM

## 2017-12-04 NOTE — TELEPHONE ENCOUNTER
Patient called and would like to establish prenatal care   Patient LMP 10/24/17  Patient G1 P  Patient complains of nothing at this time   Patient is taking prenatal vitamins

## 2017-12-21 ENCOUNTER — OFFICE VISIT (OUTPATIENT)
Dept: OBGYN | Facility: CLINIC | Age: 37
End: 2017-12-21
Attending: ADVANCED PRACTICE MIDWIFE
Payer: COMMERCIAL

## 2017-12-21 DIAGNOSIS — O09.521 ELDERLY MULTIGRAVIDA IN FIRST TRIMESTER: ICD-10-CM

## 2017-12-21 DIAGNOSIS — O09.519 HIGH-RISK PREGNANCY, ELDERLY PRIMIGRAVIDA: Primary | ICD-10-CM

## 2017-12-21 DIAGNOSIS — R03.0 ELEVATED BLOOD PRESSURE READING WITHOUT DIAGNOSIS OF HYPERTENSION: ICD-10-CM

## 2017-12-21 PROBLEM — I10 CHRONIC HYPERTENSION: Status: ACTIVE | Noted: 2017-12-21

## 2017-12-21 LAB
ABO + RH BLD: NORMAL
ABO + RH BLD: NORMAL
ALT SERPL W P-5'-P-CCNC: 29 U/L (ref 0–50)
AST SERPL W P-5'-P-CCNC: 21 U/L (ref 0–45)
BASOPHILS # BLD AUTO: 0 10E9/L (ref 0–0.2)
BASOPHILS NFR BLD AUTO: 0.2 %
BLD GP AB SCN SERPL QL: NORMAL
BLOOD BANK CMNT PATIENT-IMP: NORMAL
CREAT SERPL-MCNC: 0.61 MG/DL (ref 0.52–1.04)
CREAT UR-MCNC: 21 MG/DL
DEPRECATED CALCIDIOL+CALCIFEROL SERPL-MC: 43 UG/L (ref 20–75)
DIFFERENTIAL METHOD BLD: ABNORMAL
EOSINOPHIL # BLD AUTO: 0 10E9/L (ref 0–0.7)
EOSINOPHIL NFR BLD AUTO: 0.1 %
ERYTHROCYTE [DISTWIDTH] IN BLOOD BY AUTOMATED COUNT: 12.3 % (ref 10–15)
GFR SERPL CREATININE-BSD FRML MDRD: >90 ML/MIN/1.7M2
HBV SURFACE AB SERPL IA-ACNC: 32.68 M[IU]/ML
HBV SURFACE AG SERPL QL IA: NONREACTIVE
HCT VFR BLD AUTO: 38.6 % (ref 35–47)
HGB BLD-MCNC: 13.2 G/DL (ref 11.7–15.7)
HIV 1+2 AB+HIV1 P24 AG SERPL QL IA: NONREACTIVE
IMM GRANULOCYTES # BLD: 0 10E9/L (ref 0–0.4)
IMM GRANULOCYTES NFR BLD: 0.2 %
LYMPHOCYTES # BLD AUTO: 1.7 10E9/L (ref 0.8–5.3)
LYMPHOCYTES NFR BLD AUTO: 14 %
MCH RBC QN AUTO: 29.7 PG (ref 26.5–33)
MCHC RBC AUTO-ENTMCNC: 34.2 G/DL (ref 31.5–36.5)
MCV RBC AUTO: 87 FL (ref 78–100)
MONOCYTES # BLD AUTO: 0.7 10E9/L (ref 0–1.3)
MONOCYTES NFR BLD AUTO: 6.1 %
NEUTROPHILS # BLD AUTO: 9.7 10E9/L (ref 1.6–8.3)
NEUTROPHILS NFR BLD AUTO: 79.4 %
NRBC # BLD AUTO: 0 10*3/UL
NRBC BLD AUTO-RTO: 0 /100
PLATELET # BLD AUTO: 280 10E9/L (ref 150–450)
PROT UR-MCNC: <0.05 G/L
PROT/CREAT 24H UR: NORMAL G/G CR (ref 0–0.2)
RBC # BLD AUTO: 4.44 10E12/L (ref 3.8–5.2)
SPECIMEN EXP DATE BLD: NORMAL
URATE SERPL-MCNC: 4.4 MG/DL (ref 2.6–6)
WBC # BLD AUTO: 12.2 10E9/L (ref 4–11)

## 2017-12-21 PROCEDURE — 82565 ASSAY OF CREATININE: CPT | Performed by: ADVANCED PRACTICE MIDWIFE

## 2017-12-21 PROCEDURE — 86901 BLOOD TYPING SEROLOGIC RH(D): CPT | Performed by: ADVANCED PRACTICE MIDWIFE

## 2017-12-21 PROCEDURE — 87389 HIV-1 AG W/HIV-1&-2 AB AG IA: CPT | Performed by: ADVANCED PRACTICE MIDWIFE

## 2017-12-21 PROCEDURE — 86850 RBC ANTIBODY SCREEN: CPT | Performed by: ADVANCED PRACTICE MIDWIFE

## 2017-12-21 PROCEDURE — 85025 COMPLETE CBC W/AUTO DIFF WBC: CPT | Performed by: ADVANCED PRACTICE MIDWIFE

## 2017-12-21 PROCEDURE — 84550 ASSAY OF BLOOD/URIC ACID: CPT | Performed by: ADVANCED PRACTICE MIDWIFE

## 2017-12-21 PROCEDURE — 87340 HEPATITIS B SURFACE AG IA: CPT | Performed by: ADVANCED PRACTICE MIDWIFE

## 2017-12-21 PROCEDURE — 86706 HEP B SURFACE ANTIBODY: CPT | Performed by: ADVANCED PRACTICE MIDWIFE

## 2017-12-21 PROCEDURE — 76817 TRANSVAGINAL US OBSTETRIC: CPT | Mod: ZF

## 2017-12-21 PROCEDURE — 86900 BLOOD TYPING SEROLOGIC ABO: CPT | Performed by: ADVANCED PRACTICE MIDWIFE

## 2017-12-21 PROCEDURE — 84460 ALANINE AMINO (ALT) (SGPT): CPT | Performed by: ADVANCED PRACTICE MIDWIFE

## 2017-12-21 PROCEDURE — 87086 URINE CULTURE/COLONY COUNT: CPT | Performed by: ADVANCED PRACTICE MIDWIFE

## 2017-12-21 PROCEDURE — 99211 OFF/OP EST MAY X REQ PHY/QHP: CPT | Mod: 25,ZF

## 2017-12-21 PROCEDURE — 84156 ASSAY OF PROTEIN URINE: CPT | Performed by: ADVANCED PRACTICE MIDWIFE

## 2017-12-21 PROCEDURE — 86780 TREPONEMA PALLIDUM: CPT | Performed by: ADVANCED PRACTICE MIDWIFE

## 2017-12-21 PROCEDURE — 86762 RUBELLA ANTIBODY: CPT | Performed by: ADVANCED PRACTICE MIDWIFE

## 2017-12-21 PROCEDURE — 84450 TRANSFERASE (AST) (SGOT): CPT | Performed by: ADVANCED PRACTICE MIDWIFE

## 2017-12-21 PROCEDURE — 36415 COLL VENOUS BLD VENIPUNCTURE: CPT | Performed by: ADVANCED PRACTICE MIDWIFE

## 2017-12-21 PROCEDURE — 82306 VITAMIN D 25 HYDROXY: CPT | Performed by: ADVANCED PRACTICE MIDWIFE

## 2017-12-21 RX ORDER — PRENATAL VIT/IRON FUM/FOLIC AC 27MG-0.8MG
1 TABLET ORAL DAILY
COMMUNITY
End: 2021-07-30

## 2017-12-21 ASSESSMENT — ENCOUNTER SYMPTOMS
EXERCISE INTOLERANCE: 0
HYPERTENSION: 1
SYNCOPE: 0
SLEEP DISTURBANCES DUE TO BREATHING: 0
LEG PAIN: 0
PALPITATIONS: 0
HYPOTENSION: 0
LIGHT-HEADEDNESS: 0
ORTHOPNEA: 0

## 2017-12-21 ASSESSMENT — ANXIETY QUESTIONNAIRES
5. BEING SO RESTLESS THAT IT IS HARD TO SIT STILL: NOT AT ALL
GAD7 TOTAL SCORE: 0
4. TROUBLE RELAXING: NOT AT ALL
GAD7 TOTAL SCORE: 0
3. WORRYING TOO MUCH ABOUT DIFFERENT THINGS: NOT AT ALL
GAD7 TOTAL SCORE: 0
1. FEELING NERVOUS, ANXIOUS, OR ON EDGE: NOT AT ALL
3. WORRYING TOO MUCH ABOUT DIFFERENT THINGS: NOT AT ALL
7. FEELING AFRAID AS IF SOMETHING AWFUL MIGHT HAPPEN: NOT AT ALL
7. FEELING AFRAID AS IF SOMETHING AWFUL MIGHT HAPPEN: NOT AT ALL
2. NOT BEING ABLE TO STOP OR CONTROL WORRYING: NOT AT ALL
6. BECOMING EASILY ANNOYED OR IRRITABLE: NOT AT ALL
4. TROUBLE RELAXING: NOT AT ALL
6. BECOMING EASILY ANNOYED OR IRRITABLE: NOT AT ALL
7. FEELING AFRAID AS IF SOMETHING AWFUL MIGHT HAPPEN: NOT AT ALL
1. FEELING NERVOUS, ANXIOUS, OR ON EDGE: NOT AT ALL
GAD7 TOTAL SCORE: 0
2. NOT BEING ABLE TO STOP OR CONTROL WORRYING: NOT AT ALL
5. BEING SO RESTLESS THAT IT IS HARD TO SIT STILL: NOT AT ALL
7. FEELING AFRAID AS IF SOMETHING AWFUL MIGHT HAPPEN: NOT AT ALL

## 2017-12-21 ASSESSMENT — PAIN SCALES - GENERAL: PAINLEVEL: NO PAIN (0)

## 2017-12-21 NOTE — MR AVS SNAPSHOT
After Visit Summary   12/21/2017    Rona Krishnamurthy    MRN: [id number]           Patient Information     Date Of Birth          1980        Visit Information        Provider Department      12/21/2017 10:30 AM [facility] ULTRASOUND [facility]        Today's Diagnoses     Elderly multigravida in first trimester           Follow-ups after your visit        Your next 10 appointments already scheduled     Dec 22, 2017  9:00 AM CST   New Patient Visit with Dahlia Noriega MD   [facility]  ([facility])    [facility]  [address]  [address]  [address]  [facility] [address] [phone]            Jan 04, 2018 10:00 AM CST   NEW OB with Salud Alfaro CNM   [facility] ([facility])    [facility] [address] [address]  [address]  [address]  [facility] [address] [phone]              Who to contact     Please call your clinic at [phone] to:    Ask questions about your health    Make or cancel appointments    Discuss your medicines    Learn about your test results    Speak to your doctor   If you have compliments or concerns about an experience at your clinic, or if you wish to file a complaint, please contact [facility] Patient Relations at [phone] or email us at [email or web address]         Additional Information About Your Visit        MyChart Information     [email or web address] gives you secure access to your electronic health record. If you see a primary care provider, you can also send messages to your care team and make appointments. If you have questions, please call your primary care clinic.  If you do not have a primary care provider, please call [phone] and they will assist you.      [email or web address] is an electronic gateway that provides easy, online access to your medical records. With [email or web address], you can  request a clinic appointment, read your test results, renew a prescription or communicate with your care team.     To access your existing account, please contact your [facility] or call [phone] for assistance.        Care EveryWhere ID     This is your Care EveryWhere ID. This could be used by other organizations to access your [place] medical records  [id number]        Your Vitals Were     Last Period                   10/24/2017            Blood Pressure from Last 3 Encounters:   12/21/17 136/90   10/18/17 121/86   10/03/17 (!) 158/95    Weight from Last 3 Encounters:   12/21/17 69 kg (152 lb 3.2 oz)   10/18/17 69.4 kg (153 lb)   10/03/17 70.4 kg (155 lb 3.2 oz)              We Performed the Following     Dating ultrasound less than 14 weeks gestation Transvag  - [id number] (In Clinic)        Primary Care Provider Office Phone # Fax #    Mamadou Canales -[phone]       [address]  [facility] [address]        Equal Access to Services     [facility]: Hadii aad ku hadasho Soomaali, waaxda luqadaha, qaybta kaalmada adeegyaeva, kim romero . So [facility] [phone].    ATENCIÓN: Si habla español, tiene a trevizo disposición servicios gratuitos de asistencia lingüística. Ll[facility] [phone].    We comply with applicable federal civil rights laws and [place] laws. We do not discriminate on the basis of race, color, national origin, age, disability, sex, sexual orientation, or gender identity.            Thank you!     Thank you for choosing [facility]  for your care. Our goal is always to provide you with excellent care. Hearing back from our patients is one way we can continue to improve our services. Please take a few minutes to complete the written survey that you may receive in the mail after your visit with us. Thank you!             Your Updated Medication List - Protect others around you: Learn how to  safely use, store and throw away your medicines at www.disposemymeds.org.          This list is accurate as of: 12/21/17  1:49 PM.  Always use your most recent med list.                   Brand Name Dispense Instructions for use Diagnosis    doxylamine 25 MG Tabs tablet    UNISOM     Take 25 mg by mouth At Bedtime        L-THEANINE PO      Take 200 mg by mouth daily        prenatal multivitamin plus iron 27-0.8 MG Tabs per tablet      Take 1 tablet by mouth daily

## 2017-12-21 NOTE — LETTER
2017       RE: Rona Krishnamurthy  [address] [facility] [address]     Dear Colleague,    Thank you for referring your patient, Rona Krishnamurthy, to the [facility] Please see a copy of my visit note below.    37 year old female, , with LMP 10/24/2017, 8 2/7 weeks. Estimated Date of Delivery: 2018,  presents for confirmation of dates and assessment of viability. This study was done transvaginally.    Measurements     CRL = 10.7 mm = 7 1/7 weeks  EGA.   MARYANNE = 2018.     Fetal pole and yolk sac identified.     Fetal/Fetal Cardiac Activity: Present.  FHR = 151bpm.     Implantation: Intrauterine.     Cervix = 4.0 cm      Maternal structures appear normal.    Impression: IUP at 7w1d by today's ultrasound    Recommend comprehensive scan at 18 to 20 weeks.    [facility]    Ce Porras MD    Again, thank you for allowing me to participate in the care of your patient.      Sincerely,    [facility] Ultrasound

## 2017-12-21 NOTE — MR AVS SNAPSHOT
"              After Visit Summary   12/21/2017    Rona Krishnamurthy    MRN: [id number]           Patient Information     Date Of Birth          1980        Visit Information        Provider Department      12/21/2017 11:00 AM Salud Alfaro CNM [facility]        Today's Diagnoses     High-risk pregnancy, elderly primigravida    -  1    Elevated blood pressure reading without diagnosis of hypertension          Care Instructions      Thank you for choosing [facility] for your [facility]etrical care.  We are an integrated health clinic with obstetricians, midwives, a psychologist, an acupuncturist, a nutritionist, a pharmacist, internal medicine and family practice all in one.  If you have questions about services offered please ask.      o Please keep all appointments with your provider.  You will help catch, prevent and treat problems early.  o Review your Expectant Family booklet and folder given to you at this intake visit.  It can answer many basic questions including:    Treatment for nausea and vomiting    Medications that are safe in pregnancy    Healthy diet and weight gain    Exercise and activity  o ASK questions!!  Please use \"Questions for my New OB visit\" form to write down any questions or concerns for your next visit.  o Eat a healthy diet.  Visit www.choosemyplate.gov and click on  Pregnancy and Breastfeeding  for information and tips  o Do not smoke.  Avoid other people's smoke, too.  We are happy to help with referrals to stop smoking programs.  o Do not drink alcohol.  o Try to avoid people who have colds or other infections.  Practice good hand washing.  o Consider registering for our Healthy Pregnancy Class here at [facility].  This class is offered every 3rd Wednesday from 2:30-4:30 p.m.  [place] at [phone] or online at [email or web address] or [employer] [email or web address]  o Consider registering for prenatal education " "classes through [employer] at [facility].  You can view class schedules and register online at [email or web address] or call [phone] for questions     For urgent concerns, call [facility] at [phone] to speak with a triage nurse during regular clinic hours (8:00 am - 4:30 pm).  This line is answered by our service 24 hours a day, after hours a provider will return your call.  Thank you for choosing [facility] for your obstetrical care.  We are an integrated health clinic with obstetricians, midwives, a psychologist, an acupuncturist, a nutritionist, a pharmacist, internal medicine and family practice all in one.  If you have questions about services offered please ask.      o Please keep all appointments with your provider.  You will help catch, prevent and treat problems early.  o Review your Expectant Family booklet and folder given to you at this intake visit.  It can answer many basic questions including:    Treatment for nausea and vomiting    Medications that are safe in pregnancy    Healthy diet and weight gain    Exercise and activity  o ASK questions!!  Please use \"Questions for my New OB visit\" form to write down any questions or concerns for your next visit.  o Eat a healthy diet.  Visit www.choosemyplate.gov and click on  Pregnancy and Breastfeeding  for information and tips  o Do not smoke.  Avoid other people's smoke, too.  We are happy to help with referrals to stop smoking programs.  o Do not drink alcohol.  o Try to avoid people who have colds or other infections.  Practice good hand washing.  o Consider registering for our Healthy Pregnancy Class here at [facility].  This class is offered every 3rd Wednesday from 2:30-4:30 p.m.  [place] at [phone] or online at [email or web address] or [email or web address]  o Consider registering for prenatal education classes through [employer] at [facility].  You can view class " schedules and register online at [email or web address] or call [phone] for questions     For urgent concerns, call [facility] at [phone] to speak with a triage nurse during regular clinic hours (8:00 am - 4:30 pm).  This line is answered by our service 24 hours a day, after hours a provider will return your call.          Follow-ups after your visit        Follow-up notes from your care team     Return in about 2 weeks (around 1/4/2018) for NOB.      Your next 10 appointments already scheduled     Jan 04, 2018 10:00 AM CST   NEW OB with Salud Alfaro CNM   [facility] ([facility])    [place] [facility] [address]  [address]  [address]  [facility] [address] [phone]            Jan 22, 2018 10:00 AM CST   Return Visit with Dahlia Noriega MD   [facility]  ([facility])    [place] [facility]  [address]  [address]  [facility]  [facility] [address] [phone]              Who to contact     Please call your clinic at [phone] to:    Ask questions about your health    Make or cancel appointments    Discuss your medicines    Learn about your test results    Speak to your doctor   If you have compliments or concerns about an experience at your clinic, or if you wish to file a complaint, please contact [facility] Patient Relations at [phone] or email us at [email or web address]         Additional Information About Your Visit        [place] Information     [employer]t gives you secure access to your electronic health record. If you see a primary care provider, you can also send messages to your care team and make appointments. If you have questions, please call your primary care clinic.  If you do not have a primary care provider, please call [phone] and they will assist you.      [employer] is an electronic gateway that  "provides easy, online access to your medical records. With SideTour, you can request a clinic appointment, read your test results, renew a prescription or communicate with your care team.     To access your existing account, please contact your [facility] or call [phone] for assistance.        Care EveryWhere ID     This is your Care EveryWhere ID. This could be used by other organizations to access your [place] medical records  [id number]        Your Vitals Were     Pulse Height Last Period BMI (Body Mass Index)          91 1.575 m (5' 2.01\") 10/24/2017 27.83 kg/m2         Blood Pressure from Last 3 Encounters:   12/22/17 129/83   12/21/17 136/90   10/18/17 121/86    Weight from Last 3 Encounters:   12/22/17 69.8 kg (153 lb 12.8 oz)   12/21/17 69 kg (152 lb 3.2 oz)   10/18/17 69.4 kg (153 lb)              We Performed the Following     25- OH-Vitamin D     ABO/Rh Type and Screen [TVB488]     ALT     Anti Treponema [ZKJ5986]     AST     CBC with Platelets Differential [AKT540]     Creatinine urine calculation only     Creatinine     Hepatitis B Surface Antibody     Hepatitis B Surface Antigen [CVT732]     HIV Antigen Antibody Combo [RDO8016]     Protein  random urine with Creat Ratio     Rubella Antibody IgG Quantitative [PSW1672]     Uric acid     Urine Culture Aerobic Bacterial [DEC486]        Primary Care Provider Office Phone # Fax #    Mamadou Canales [phone]       [address] [facility] [address]        Equal Access to Services     BERT ACUNA : Hadii aad ku hadasho Soomaali, waaxda luqadaha, qaybta kaalmada adeegyada, kim bryan. So [facility] [phone].    ATENCIÓN: Si habla español, tiene a trevizo disposición servicios gratuitos de asistencia lingüística. Llame al [phone].    We comply with applicable federal civil rights laws and [place] laws. We do not discriminate on the basis of race, color, national " origin, age, disability, sex, sexual orientation, or gender identity.            Thank you!     Thank you for choosing [facility]  for your care. Our goal is always to provide you with excellent care. Hearing back from our patients is one way we can continue to improve our services. Please take a few minutes to complete the written survey that you may receive in the mail after your visit with us. Thank you!             Your Updated Medication List - Protect others around you: Learn how to safely use, store and throw away your medicines at www.disposemymeds.org.          This list is accurate as of: 12/21/17 11:59 PM.  Always use your most recent med list.                   Brand Name Dispense Instructions for use Diagnosis    doxylamine 25 MG Tabs tablet    UNISOM     Take 25 mg by mouth At Bedtime        L-THEANINE PO      Take 200 mg by mouth daily        prenatal multivitamin plus iron 27-0.8 MG Tabs per tablet      Take 1 tablet by mouth daily

## 2017-12-21 NOTE — PATIENT INSTRUCTIONS
"  Thank you for choosing [facility] for your obstetrical care.  We are an integrated health clinic with obstetricians, midwives, a psychologist, an acupuncturist, a nutritionist, a pharmacist, internal medicine and family practice all in one.  If you have questions about services offered please ask.      o Please keep all appointments with your provider.  You will help catch, prevent and treat problems early.  o Review your Expectant Family booklet and folder given to you at this intake visit.  It can answer many basic questions including:    Treatment for nausea and vomiting    Medications that are safe in pregnancy    Healthy diet and weight gain    Exercise and activity  o ASK questions!!  Please use \"Questions for my New OB visit\" form to write down any questions or concerns for your next visit.  o Eat a healthy diet.  Visit www.choosemyplate.gov and click on  Pregnancy and Breastfeeding  for information and tips  o Do not smoke.  Avoid other people's smoke, too.  We are happy to help with referrals to stop smoking programs.  o Do not drink alcohol.  o Try to avoid people who have colds or other infections.  Practice good hand washing.  o Consider registering for our Healthy Pregnancy Class here at [facility].  This class is offered every 3rd Wednesday from 2:30-4:30 p.m.  [place] at [phone] or online at [email or web address] or [email or web address]  o Consider registering for prenatal education classes through [place] at [facility].  You can view class schedules and register online at [email or web address] or call [phone] for questions     For urgent concerns, call [facility] at [phone] to speak with a triage nurse during regular clinic hours (8:00 am - 4:30 pm).  This line is answered by our service 24 hours a day, after hours a provider will return your call.  Thank you for choosing [facility] ([facility] for your " "obstetrical care.  We are an integrated health clinic with obstetricians, midwives, a psychologist, an acupuncturist, a nutritionist, a pharmacist, internal medicine and family practice all in one.  If you have questions about services offered please ask.      o Please keep all appointments with your provider.  You will help catch, prevent and treat problems early.  o Review your Expectant Family booklet and folder given to you at this intake visit.  It can answer many basic questions including:    Treatment for nausea and vomiting    Medications that are safe in pregnancy    Healthy diet and weight gain    Exercise and activity  o ASK questions!!  Please use \"Questions for my New OB visit\" form to write down any questions or concerns for your next visit.  o Eat a healthy diet.  Visit www.choosemyplate.gov and click on  Pregnancy and Breastfeeding  for information and tips  o Do not smoke.  Avoid other people's smoke, too.  We are happy to help with referrals to stop smoking programs.  o Do not drink alcohol.  o Try to avoid people who have colds or other infections.  Practice good hand washing.  o Consider registering for our Healthy Pregnancy Class here at [facility].  This class is offered every 3rd Wednesday from 2:30-4:30 p.m.  [place] at [phone] or online at [email or web address]  o Consider registering for prenatal education classes through [place] at [facility].  You can view class schedules and register online at [email or web address] or call [phone] for questions     For urgent concerns, call [facility] at [phone] to speak with a triage nurse during regular clinic hours (8:00 am - 4:30 pm).  This line is answered by our service 24 hours a day, after hours a provider will return your call.  "

## 2017-12-21 NOTE — PROGRESS NOTES
37 year old female, , with LMP 10/24/2017, 8 2/7 weeks. Estimated Date of Delivery: 2018,  presents for confirmation of dates and assessment of viability. This study was done transvaginally.    Measurements     CRL = 10.7 mm = 7 1/7 weeks  EGA.   MARYANNE = 2018.     Fetal pole and yolk sac identified.     Fetal/Fetal Cardiac Activity: Present.  FHR = 151bpm.     Implantation: Intrauterine.     Cervix = 4.0 cm      Maternal structures appear normal.    Impression: IUP at 7w1d by today's ultrasound    Recommend comprehensive scan at 18 to 20 weeks.    REGGIE Gallardo MD

## 2017-12-21 NOTE — LETTER
2017       RE: Rona Krishnamurthy  [address] [place], [place]. \[facility].\"" [id number]     Dear Colleague,    Thank you for referring your patient, Rona Krishnamurthy, to the [facility] at [facility]. Please see a copy of my visit note below.      Subjective   37 year old female who presents to clinic for initiation of OB care.   at 7w1d with estimated date of delivery of Aug 8, 2018 based on dating ultrasound, not c/w LMP.    Pregnancy is planned. VERY excited. Thought she was not going to be able to have children. Was going to see [facility] but started doing acupuncture 3x/week, taking tammy berry, dhea and royal jelly.    Symptoms since LMP include fatigue.  Patient has tried these relief measures: increased rest.      PERSONAL/SOCIAL HISTORY  [place]   Employment:  for [employer]  Additional items: Denies past or present domestic violence, sexual and psychological abuse.    OTHER:  Elevated blood pressure vs diagnosis of Hypertension:   - 10/3/17: 167/98, 155/97, 153/91, 158/95   - Seen by Dr. Todd, 24 hours BP monitoring at home   - 2017: 162/88, 144/87, 144/85, 136/90    - Consulted with Dr. Mckeon    - Scheduled for appt with Dr. Huerta on  to evaluate need for anti-hypertensives   - Baseline pre-e labs ordered   - States her mom and sister have hypertension, her sister had ghtn and her mom and sister had pre-eclampsia    Objective  -VS: reviewed and within normal limits   -General appearance: no acute distress, patient is comfortable   NEUROLOGICAL/PSYCHIATRIC   - Oriented x3,   -Mood and affect: : normal   Genetic/Infection questionnaire completed, risks include:  Hypertension, family hx of htn, gthn and pre-e    Last pap 10/3/17 NIL, negative hpv  Denies hx of abnormal paps  Reports she had a genital hpv wart frozen off 5 years ago    Assessment/Plan   at 7w1d  By dating u/s, not c/w  lmp  Encounter Diagnoses   Name Primary?     High-risk pregnancy, elderly primigravida Yes     Chronic hypertension      Orders Placed This Encounter   Procedures     Hepatitis B Surface Antibody     AST     ALT     Protein  random urine with Creat Ratio     Creatinine     Uric acid     25- OH-Vitamin D     CBC with Platelets Differential [PDR733]     Anti Treponema [CQJ6216]     Rubella Antibody IgG Quantitative [ENU5505]     Hepatitis B Surface Antigen [ZZM048]     HIV Antigen Antibody Combo [YEP0353]     Creatinine urine calculation only     ABO/Rh Type and Screen [JUN222]     Orders Placed This Encounter   Medications     Prenatal Vit-Fe Fumarate-FA (PRENATAL MULTIVITAMIN PLUS IRON) 27-0.8 MG TABS per tablet     Sig: Take 1 tablet by mouth daily     - Oriented to Practice, types of care, and how to reach a provider.  MD care.  - Patient received 1st trimester new OB education packet complete with aide of The Expectant Family booklet including information on genetic screening test options.  - Patient would like to disucss options further at new OB visit which will be ordered at 1st OB exam.  - Patient was encouraged to start prenatal vitamins as tolerated.     - Patient instructed to schedule new OB exam with provider at 10 weeks.    - Pregnancy concerns to be addressed by provider at new OB exam include: High blood pressure vs diagnosis of chronic hypertension, needs GC/CT at NOB    - OBI education reviewed.  - OBI labs ordered.  - Baseline pre-e labs ordered.  - Consulted with Dr. Mckeon re: today's blood pressures.  - Scheduled for appt with Dr. Huerta on 12/22 to evaluate need for anti-hypertensives.  - Needs GC/CT at NOB.  - Pap up to date 10/3/17 NIL, neg HPV.  - Undecided on 1st trimester screening, Needs mfm referral for level 2 ultrasound and 1st trimester screening if desired at NOB.  - Discussed MD care for hypertension if on medication. Pt agreeable.    Pt to RTO for NOB visit in 2-3 weeks and prn if  questions or concerns    NITHIN Welsh, CNM

## 2017-12-21 NOTE — PROGRESS NOTES
Subjective   37 year old female who presents to clinic for initiation of OB care.   at 7w1d with estimated date of delivery of Aug 8, 2018 based on dating ultrasound, not c/w LMP.    Pregnancy is planned. VERY excited. Thought she was not going to be able to have children. Was going to see [facility] but started doing acupuncture 3x/week, taking tammy berry, dhea and royal jelly.    Symptoms since LMP include fatigue.  Patient has tried these relief measures: increased rest.      PERSONAL/SOCIAL HISTORY  [place]  , [place]   Employment:  for [employer]  Additional items: Denies past or present domestic violence, sexual and psychological abuse.    OTHER:  Elevated blood pressure vs diagnosis of Hypertension:   - 10/3/17: 167/98, 155/97, 153/91, 158/95   - Seen by Dr. Todd, 24 hours BP monitoring at home   - 2017: 162/88, 144/87, 144/85, 136/90    - Consulted with Dr. Mckeon    - Scheduled for appt with Dr. Huerta on  to evaluate need for anti-hypertensives   - Baseline pre-e labs ordered   - States her mom and sister have hypertension, her sister had ghtn and her mom and sister had pre-eclampsia    Objective  -VS: reviewed and within normal limits   -General appearance: no acute distress, patient is comfortable   NEUROLOGICAL/PSYCHIATRIC   - Oriented x3,   -Mood and affect: : normal   Genetic/Infection questionnaire completed, risks include:  Hypertension, family hx of htn, gthn and pre-e    Last pap 10/3/17 NIL, negative hpv  Denies hx of abnormal paps  Reports she had a genital hpv wart frozen off 5 years ago    Assessment/Plan   at 7w1d  By dating u/s, not c/w lmp  Encounter Diagnoses   Name Primary?     High-risk pregnancy, elderly primigravida Yes     Chronic hypertension      Orders Placed This Encounter   Procedures     Hepatitis B Surface Antibody     AST     ALT     Protein  random urine with Creat Ratio     Creatinine     Uric acid     25- OH-Vitamin D      CBC with Platelets Differential [ZLO395]     Anti Treponema [FAD7170]     Rubella Antibody IgG Quantitative [BUC5096]     Hepatitis B Surface Antigen [EJS841]     HIV Antigen Antibody Combo [NCW8624]     Creatinine urine calculation only     ABO/Rh Type and Screen [WUV458]     Orders Placed This Encounter   Medications     Prenatal Vit-Fe Fumarate-FA (PRENATAL MULTIVITAMIN PLUS IRON) 27-0.8 MG TABS per tablet     Sig: Take 1 tablet by mouth daily     - Oriented to Practice, types of care, and how to reach a provider.  MD care.  - Patient received 1st trimester new OB education packet complete with aide of The Expectant Family booklet including information on genetic screening test options.  - Patient would like to disucss options further at new OB visit which will be ordered at 1st OB exam.  - Patient was encouraged to start prenatal vitamins as tolerated.     - Patient instructed to schedule new OB exam with provider at 10 weeks.    - Pregnancy concerns to be addressed by provider at new OB exam include: High blood pressure vs diagnosis of chronic hypertension, needs GC/CT at NOB    - OBI education reviewed.  - OBI labs ordered.  - Baseline pre-e labs ordered.  - Consulted with Dr. Mckeon re: today's blood pressures.  - Scheduled for appt with Dr. Huerta on 12/22 to evaluate need for anti-hypertensives.  - Needs GC/CT at NOB.  - Pap up to date 10/3/17 NIL, neg HPV.  - Undecided on 1st trimester screening, Needs mfm referral for level 2 ultrasound and 1st trimester screening if desired at NOB.  - Discussed MD care for hypertension if on medication. Pt agreeable.    Pt to RTO for NOB visit in 2-3 weeks and prn if questions or concerns    NITHIN Welsh, CNMAMI

## 2017-12-22 ENCOUNTER — OFFICE VISIT (OUTPATIENT)
Dept: INTERNAL MEDICINE | Facility: CLINIC | Age: 37
End: 2017-12-22
Attending: INTERNAL MEDICINE
Payer: COMMERCIAL

## 2017-12-22 VITALS
BODY MASS INDEX: 28.3 KG/M2 | HEIGHT: 62 IN | SYSTOLIC BLOOD PRESSURE: 129 MMHG | DIASTOLIC BLOOD PRESSURE: 83 MMHG | WEIGHT: 153.8 LBS | HEART RATE: 99 BPM

## 2017-12-22 DIAGNOSIS — O09.519 HIGH-RISK PREGNANCY, ELDERLY PRIMIGRAVIDA: ICD-10-CM

## 2017-12-22 DIAGNOSIS — R03.0 ELEVATED BLOOD PRESSURE READING WITHOUT DIAGNOSIS OF HYPERTENSION: Primary | ICD-10-CM

## 2017-12-22 LAB
BACTERIA SPEC CULT: NO GROWTH
Lab: NORMAL
RUBV IGG SERPL IA-ACNC: 38 IU/ML
SPECIMEN SOURCE: NORMAL
T PALLIDUM IGG+IGM SER QL: NEGATIVE

## 2017-12-22 PROCEDURE — 99212 OFFICE O/P EST SF 10 MIN: CPT | Mod: ZF

## 2017-12-22 ASSESSMENT — ENCOUNTER SYMPTOMS
TASTE DISTURBANCE: 0
SWOLLEN GLANDS: 0
CHILLS: 0
HEARTBURN: 0
BOWEL INCONTINENCE: 0
WEIGHT GAIN: 0
INCREASED ENERGY: 0
FLANK PAIN: 0
NAIL CHANGES: 0
SINUS CONGESTION: 0
DYSURIA: 0
INSOMNIA: 0
SPEECH CHANGE: 0
NERVOUS/ANXIOUS: 0
MUSCLE CRAMPS: 0
WHEEZING: 0
JOINT SWELLING: 0
HEADACHES: 0
POLYDIPSIA: 0
LOSS OF CONSCIOUSNESS: 0
NECK MASS: 0
DECREASED LIBIDO: 0
SINUS PAIN: 0
ABDOMINAL PAIN: 0
HEMOPTYSIS: 0
FEVER: 0
COUGH DISTURBING SLEEP: 0
SPUTUM PRODUCTION: 0
COUGH: 0
POLYPHAGIA: 0
DISTURBANCES IN COORDINATION: 0
BRUISES/BLEEDS EASILY: 0
EXTREMITY NUMBNESS: 0
MEMORY LOSS: 0
DIZZINESS: 0
NIGHT SWEATS: 0
WEIGHT LOSS: 0
VOMITING: 0
SHORTNESS OF BREATH: 0
PARALYSIS: 0
SNORES LOUDLY: 0
HALLUCINATIONS: 0
TROUBLE SWALLOWING: 0
FATIGUE: 0
JAUNDICE: 0
SMELL DISTURBANCE: 0
DIARRHEA: 0
SEIZURES: 0
SKIN CHANGES: 0
EYE IRRITATION: 0
EYE PAIN: 0
SORE THROAT: 0
TREMORS: 0
DEPRESSION: 0
DOUBLE VISION: 0
BACK PAIN: 0
WEAKNESS: 0
RECTAL BLEEDING: 0
DYSPNEA ON EXERTION: 0
BREAST MASS: 0
TINGLING: 0
HEMATURIA: 0
HOARSE VOICE: 0
RESPIRATORY PAIN: 0
NECK PAIN: 0
DECREASED APPETITE: 0
POSTURAL DYSPNEA: 0
NUMBNESS: 0
STIFFNESS: 0
CONSTIPATION: 0
DIFFICULTY URINATING: 0
HOT FLASHES: 0
EYE WATERING: 0
PANIC: 0
MYALGIAS: 0
RECTAL PAIN: 0
ARTHRALGIAS: 0
MUSCLE WEAKNESS: 0
BLOATING: 0
POOR WOUND HEALING: 0
BREAST PAIN: 0
EYE REDNESS: 0
NAUSEA: 0
DECREASED CONCENTRATION: 0
BLOOD IN STOOL: 0
ALTERED TEMPERATURE REGULATION: 0

## 2017-12-22 ASSESSMENT — ANXIETY QUESTIONNAIRES
GAD7 TOTAL SCORE: 0
GAD7 TOTAL SCORE: 0

## 2017-12-22 NOTE — MR AVS SNAPSHOT
After Visit Summary   12/22/2017    Rona Krishnamurthy    MRN: [id number]           Patient Information     Date Of Birth          1980        Visit Information        Provider Department      12/22/2017 9:00 AM Dahlia Villa MD [facility]         Care Instructions    BP is okay today.   24 hr study was normal.   When checking blood pressure at home, sit for 5 min before taking it  Follow-up in 4 wks.   Call us if headaches, changes in vision, chest pain, etc.             Follow-ups after your visit        Follow-up notes from your care team     Return in about 4 weeks (around 1/19/2018) for BP Recheck.      Your next 10 appointments already scheduled     Jan 04, 2018 10:00 AM CST   NEW OB with Salud Alfaro CNM   [facility] ([facility])    [facility] [address]  [address]  [facility] [address] [phone]              Who to contact     Please call your clinic at [phone] to:    Ask questions about your health    Make or cancel appointments    Discuss your medicines    Learn about your test results    Speak to your doctor   If you have compliments or concerns about an experience at your clinic, or if you wish to file a complaint, please contact [facility] Patient Relations at [phone] or email us at [email or web address]         Additional Information About Your Visit        MyChart Information     [employer] gives you secure access to your electronic health record. If you see a primary care provider, you can also send messages to your care team and make appointments. If you have questions, please call your primary care clinic.  If you do not have a primary care provider, please call [phone] and they will assist you.      [employer] is an electronic gateway that provides easy, online access to your medical records. With [employer],  "you can request a clinic appointment, read your test results, renew a prescription or communicate with your care team.     To access your existing account, please contact your [facility] or call [phone] for assistance.        Care EveryWhere ID     This is your Care EveryWhere ID. This could be used by other organizations to access your [place] medical records  [id number]        Your Vitals Were     Pulse Height Last Period BMI (Body Mass Index)          99 1.575 m (5' 2.01\") 10/24/2017 28.12 kg/m2         Blood Pressure from Last 3 Encounters:   12/22/17 160/84   12/21/17 136/90   10/18/17 121/86    Weight from Last 3 Encounters:   12/22/17 69.8 kg (153 lb 12.8 oz)   12/21/17 69 kg (152 lb 3.2 oz)   10/18/17 69.4 kg (153 lb)              Today, you had the following     No orders found for display       Primary Care Provider Office Phone # Fax #    Mamadou Canales [phone]       [address] [facility] [address]        Equal Access to Services     [facility]: Hadii aad ku hadasho Soomaali, waaxda luqadaha, qaybta kaalmada adeegyada, kim rousseau haypatrician simón romero . So [facility] [phone].    ATENCIÓN: Si habla español, tiene a trevizo disposición servicios gratuitos de asistencia lingüística. Llame al [phone].    We comply with applicable federal civil rights laws and [place] laws. We do not discriminate on the basis of race, color, national origin, age, disability, sex, sexual orientation, or gender identity.            Thank you!     Thank you for choosing [facility]   for your care. Our goal is always to provide you with excellent care. Hearing back from our patients is one way we can continue to improve our services. Please take a few minutes to complete the written survey that you may receive in the mail after your visit with us. Thank you!             Your Updated Medication List - Protect others around " you: Learn how to safely use, store and throw away your medicines at www.disposemymeds.org.          This list is accurate as of: 12/22/17  9:34 AM.  Always use your most recent med list.                   Brand Name Dispense Instructions for use Diagnosis    doxylamine 25 MG Tabs tablet    UNISOM     Take 25 mg by mouth At Bedtime        L-THEANINE PO      Take 200 mg by mouth daily        prenatal multivitamin plus iron 27-0.8 MG Tabs per tablet      Take 1 tablet by mouth daily

## 2017-12-22 NOTE — PROGRESS NOTES
HPI:       Rona Krishnamurthy is a 37 year old female who presents for the following  Patient presents with: hypertension    Tiffanie is a 38 yo  female at 7w2d who is here for evaluation of high blood pressure readings while in clinic. She was first found to have elevated readings (150s-160s/90s) at the beginning of October when she was here for her annual exam. She then saw Dr. Todd, and when her BP was checked via the AHA protocol, her BP was normal. She then had a 24 hr BP study done (also done on a high stress day as  was having surgery), and her 24 hr average was 120/77. However, she was in clinic yesterday for her first OB ultrasound and her initial BP reading was again 162/88. However, repeat readings were 130s-140s/80s-90.     Today, Tiffanie denies any history of vision changes, headaches, or chest pain. She had a Cr of 0.61 and negative urine protein yesterday. She does check her BP at home and her BP is in the mid upper 140s. She also notes that her mom and sister both have hypertension, her sister had gestational hypertension, and her mom and sister both had preeclampsia.      Past Medical History:   Diagnosis Date     Atopic dermatitis 2005     Past Surgical History:   Procedure Laterality Date     BREAST SURGERY  1998    fibroadenoma removal     HC TOOTH EXTRACTION W/FORCEP       Family History   Problem Relation Age of Onset     Breast Cancer Other      Aunt - Dad's sister     Breast Cancer Other      Mom's grandma     Prostate Cancer Father      Hypertension Mother      Preeclampsia Mother      DIABETES Paternal Grandmother      DIABETES Paternal Grandfather      Hypertension Sister      Preeclampsia Sister      Social History   Substance Use Topics     Smoking status: Never Smoker     Smokeless tobacco: Never Used     Alcohol use No     Problem, Medication and Allergy Lists were reviewed and are current.  Patient is an established patient of this clinic.         Review of  Systems:   Review of Systems     Constitutional:  Negative for fever, chills, weight loss, weight gain, fatigue, decreased appetite, night sweats, recent stressors, height gain, height loss, post-operative complications, incisional pain, hallucinations, increased energy, hyperactivity and confused.   HENT:  Negative for ear pain, hearing loss, tinnitus, nosebleeds, trouble swallowing, hoarse voice, mouth sores, sore throat, ear discharge, tooth pain, gum tenderness, taste disturbance, smell disturbance, hearing aid, bleeding gums, dry mouth, sinus pain, sinus congestion and neck mass.    Eyes:  Negative for double vision, pain, redness, eye pain, decreased vision, eye watering, eye bulging, eye dryness, flashing lights, spots, floaters, strabismus, tunnel vision, jaundice and eye irritation.   Respiratory:   Negative for cough, hemoptysis, sputum production, shortness of breath, wheezing, snores loudly, respiratory pain, dyspnea on exertion, cough disturbing sleep and postural dyspnea.    Cardiovascular:  Negative for dyspnea on exertion.   Gastrointestinal:  Negative for heartburn, nausea, vomiting, abdominal pain, diarrhea, constipation, blood in stool, melena, rectal pain, bloating, hemorrhoids, bowel incontinence, jaundice, rectal bleeding, coffee ground emesis and change in stool.   Genitourinary:  Negative for bladder incontinence, dysuria, urgency, hematuria, flank pain, vaginal discharge, difficulty urinating, genital sores, dyspareunia, decreased libido, nocturia, voiding less frequently, arousal difficulty, abnormal vaginal bleeding, excessive menstruation, menstrual changes, hot flashes, vaginal dryness and postmenopausal bleeding.   Musculoskeletal:  Negative for myalgias, back pain, joint swelling, arthralgias, stiffness, muscle cramps, neck pain, bone pain, muscle weakness and fracture.   Skin:  Negative for nail changes, itching, poor wound healing, rash, hair changes, skin changes, acne, warts, poor  "wound healing, scarring, flaky skin, Raynaud's phenomenon, sensitivity to sunlight and skin thickening.   Neurological:  Negative for dizziness, tingling, tremors, speech change, seizures, loss of consciousness, weakness, numbness, headaches, disturbances in coordination, extremity numbness, memory loss, difficulty walking and paralysis.   Endo/Heme:  Negative for anemia, swollen glands and bruises/bleeds easily.   Psychiatric/Behavioral:  Negative for depression, hallucinations, memory loss, decreased concentration, mood swings and panic attacks.    Breast:  Negative for breast discharge, breast mass, breast pain and nipple retraction.   Endocrine:  Negative for altered temperature regulation, polyphagia, polydipsia, unwanted hair growth and change in facial hair.    I have personally reviewed and updated the ROS on the day of the visit.           Physical Exam:   /83  Pulse 99  Ht 1.575 m (5' 2.01\")  Wt 69.8 kg (153 lb 12.8 oz)  LMP 10/24/2017  BMI 28.12 kg/m2  Body mass index is 28.12 kg/(m^2).  Vitals were reviewed       GENERAL APPEARANCE: healthy, alert and no distress     EYES: EOMI, PERRL     HENT: ear canals and TM's normal and nose and mouth without ulcers or lesions     NECK: no adenopathy, no asymmetry, masses, or scars and thyroid normal to palpation     RESP: lungs clear to auscultation - no rales, rhonchi or wheezes     CV: regular rates and rhythm, normal S1 S2, no S3 or S4 and no murmur, click or rub     ABDOMEN:  soft, nontender, no HSM or masses and bowel sounds normal     MS: extremities normal- no gross deformities noted, no evidence of inflammation in joints, FROM in all extremities.     SKIN: no suspicious lesions or rashes     NEURO: Normal strength and tone, sensory exam grossly normal, mentation intact and speech normal     PSYCH: mentation appears normal. and affect normal/bright     LYMPHATICS: No axillary, cervical, or supraclavicular nodes        Results:     Cr 0.61  Urine " Protein Negative  Assessment and Plan     Rona was seen today for hypertension.  Rona's initial blood pressure reading today was elevated at 160/84, however on recheck with the AHA protocol, it was normal, With an average blood pressure of 129/83.  Given this finding, along with her normal 24 hour ambulatory blood pressure monitor prior to pregnancy, I would not define Rona as having chronic hypertension at this point in time.  Certainly I do think she is at risk for developing gestational hypertension.  Also she is at risk of developing preeclampsia.  She has several moderate risk factors for preeclampsia, including nulliparous until now, age over 35, and family history of preeclampsia in her mother and sister.  I do think it would be advisable for her to start on a baby aspirin at 12 weeks gestation to prevent preeclampsia.  She will need to have close monitoring of her blood pressure.  It would be advisable for her blood pressure to always be done with the AHA protocol, as this clearly is more accurate for her.  I have also advised her to continue to check her blood pressure at home, but when she does to sit quietly for 5 minutes prior to taking her blood pressure.  I have also advised her that should she have any headaches, changes in vision, or chest pain, she should contact the clinic.  We will continue to monitor her blood pressure throughout her pregnancy, with a threshold to start treatment when she is persistently at 160/100.  She will return to see me in 4 weeks.    Diagnoses and all orders for this visit:    Elevated blood pressure reading without diagnosis of hypertension  High-risk pregnancy, elderly primigravida  No indication to start BP meds today.   Will need baby ASA for preeclampsia prevention  Always check BP with AHA protocol.       Options for treatment and follow-up care were reviewed with the patient. Rona Krishnamurthy engaged in the decision making process and verbalized  understanding of the options discussed and agreed with the final plan.    Dahlia Noriega MD  Dec 22, 2017

## 2017-12-22 NOTE — LETTER
2017       RE: Rona Krishnamurthy  [address] [facility] [address]     Dear Colleague,    Thank you for referring your patient, Rona Krishnamurthy, to the [facility]  at [facility]. Please see a copy of my visit note below.          HPI:       Rona Krishnamurthy is a 37 year old female who presents for the following  Patient presents with: hypertension    Tiffanie is a 38 yo  female at 7w2d who is here for evaluation of high blood pressure readings while in clinic. She was first found to have elevated readings (150s-160s/90s) at the beginning of October when she was here for her annual exam. She then saw Dr. Todd, and when her BP was checked via the AHA protocol, her BP was normal. She then had a 24 hr BP study done (also done on a high stress day as  was having surgery), and her 24 hr average was 120/77. However, she was in clinic yesterday for her first OB ultrasound and her initial BP reading was again 162/88. However, repeat readings were 130s-140s/80s-90.     Today, Tiffanie denies any history of vision changes, headaches, or chest pain. She had a Cr of 0.61 and negative urine protein yesterday. She does check her BP at home and her BP is in the mid upper 140s. She also notes that her mom and sister both have hypertension, her sister had gestational hypertension, and her mom and sister both had preeclampsia.      Past Medical History:   Diagnosis Date     Atopic dermatitis 2005     Past Surgical History:   Procedure Laterality Date     BREAST SURGERY  1998    fibroadenoma removal     HC TOOTH EXTRACTION W/FORCEP       Family History   Problem Relation Age of Onset     Breast Cancer Other      Aunt - Dad's sister     Breast Cancer Other      Mom's grandma     Prostate Cancer Father      Hypertension Mother      Preeclampsia Mother      DIABETES Paternal Grandmother      DIABETES Paternal Grandfather      Hypertension  Sister      Preeclampsia Sister      Social History   Substance Use Topics     Smoking status: Never Smoker     Smokeless tobacco: Never Used     Alcohol use No     Problem, Medication and Allergy Lists were reviewed and are current.  Patient is an established patient of this clinic.         Review of Systems:   Review of Systems     Constitutional:  Negative for fever, chills, weight loss, weight gain, fatigue, decreased appetite, night sweats, recent stressors, height gain, height loss, post-operative complications, incisional pain, hallucinations, increased energy, hyperactivity and confused.   HENT:  Negative for ear pain, hearing loss, tinnitus, nosebleeds, trouble swallowing, hoarse voice, mouth sores, sore throat, ear discharge, tooth pain, gum tenderness, taste disturbance, smell disturbance, hearing aid, bleeding gums, dry mouth, sinus pain, sinus congestion and neck mass.    Eyes:  Negative for double vision, pain, redness, eye pain, decreased vision, eye watering, eye bulging, eye dryness, flashing lights, spots, floaters, strabismus, tunnel vision, jaundice and eye irritation.   Respiratory:   Negative for cough, hemoptysis, sputum production, shortness of breath, wheezing, snores loudly, respiratory pain, dyspnea on exertion, cough disturbing sleep and postural dyspnea.    Cardiovascular:  Negative for dyspnea on exertion.   Gastrointestinal:  Negative for heartburn, nausea, vomiting, abdominal pain, diarrhea, constipation, blood in stool, melena, rectal pain, bloating, hemorrhoids, bowel incontinence, jaundice, rectal bleeding, coffee ground emesis and change in stool.   Genitourinary:  Negative for bladder incontinence, dysuria, urgency, hematuria, flank pain, vaginal discharge, difficulty urinating, genital sores, dyspareunia, decreased libido, nocturia, voiding less frequently, arousal difficulty, abnormal vaginal bleeding, excessive menstruation, menstrual changes, hot flashes, vaginal dryness and  "postmenopausal bleeding.   Musculoskeletal:  Negative for myalgias, back pain, joint swelling, arthralgias, stiffness, muscle cramps, neck pain, bone pain, muscle weakness and fracture.   Skin:  Negative for nail changes, itching, poor wound healing, rash, hair changes, skin changes, acne, warts, poor wound healing, scarring, flaky skin, Raynaud's phenomenon, sensitivity to sunlight and skin thickening.   Neurological:  Negative for dizziness, tingling, tremors, speech change, seizures, loss of consciousness, weakness, numbness, headaches, disturbances in coordination, extremity numbness, memory loss, difficulty walking and paralysis.   Endo/Heme:  Negative for anemia, swollen glands and bruises/bleeds easily.   Psychiatric/Behavioral:  Negative for depression, hallucinations, memory loss, decreased concentration, mood swings and panic attacks.    Breast:  Negative for breast discharge, breast mass, breast pain and nipple retraction.   Endocrine:  Negative for altered temperature regulation, polyphagia, polydipsia, unwanted hair growth and change in facial hair.    I have personally reviewed and updated the ROS on the day of the visit.           Physical Exam:   /83  Pulse 99  Ht 1.575 m (5' 2.01\")  Wt 69.8 kg (153 lb 12.8 oz)  LMP 10/24/2017  BMI 28.12 kg/m2  Body mass index is 28.12 kg/(m^2).  Vitals were reviewed       GENERAL APPEARANCE: healthy, alert and no distress     EYES: EOMI, PERRL     HENT: ear canals and TM's normal and nose and mouth without ulcers or lesions     NECK: no adenopathy, no asymmetry, masses, or scars and thyroid normal to palpation     RESP: lungs clear to auscultation - no rales, rhonchi or wheezes     CV: regular rates and rhythm, normal S1 S2, no S3 or S4 and no murmur, click or rub     ABDOMEN:  soft, nontender, no HSM or masses and bowel sounds normal     MS: extremities normal- no gross deformities noted, no evidence of inflammation in joints, FROM in all extremities.    "  SKIN: no suspicious lesions or rashes     NEURO: Normal strength and tone, sensory exam grossly normal, mentation intact and speech normal     PSYCH: mentation appears normal. and affect normal/bright     LYMPHATICS: No axillary, cervical, or supraclavicular nodes        Results:     Cr 0.61  Urine Protein Negative  Assessment and Plan     Rona was seen today for hypertension.  Rona's initial blood pressure reading today was elevated at 160/84, however on recheck with the AHA protocol, it was normal, With an average blood pressure of 129/83.  Given this finding, along with her normal 24 hour ambulatory blood pressure monitor prior to pregnancy, I would not define Rona as having chronic hypertension at this point in time.  Certainly I do think she is at risk for developing gestational hypertension.  Also she is at risk of developing preeclampsia.  She has several moderate risk factors for preeclampsia, including nulliparous until now, age over 35, and family history of preeclampsia in her mother and sister.  I do think it would be advisable for her to start on a baby aspirin at 12 weeks gestation to prevent preeclampsia.  She will need to have close monitoring of her blood pressure.  It would be advisable for her blood pressure to always be done with the AHA protocol, as this clearly is more accurate for her.  I have also advised her to continue to check her blood pressure at home, but when she does to sit quietly for 5 minutes prior to taking her blood pressure.  I have also advised her that should she have any headaches, changes in vision, or chest pain, she should contact the clinic.  We will continue to monitor her blood pressure throughout her pregnancy, with a threshold to start treatment when she is persistently at 160/100.  She will return to see me in 4 weeks.    Diagnoses and all orders for this visit:    Elevated blood pressure reading without diagnosis of hypertension  High-risk pregnancy,  elderly primigravida  No indication to start BP meds today.   Will need baby ASA for preeclampsia prevention  Always check BP with AHA protocol.       Options for treatment and follow-up care were reviewed with the patient. Rona Krishnamurthy engaged in the decision making process and verbalized understanding of the options discussed and agreed with the final plan.    Dahlia Noriega MD  Dec 22, 2017

## 2017-12-22 NOTE — PATIENT INSTRUCTIONS
BP is okay today.   24 hr study was normal.   When checking blood pressure at home, sit for 5 min before taking it  Follow-up in 4 wks.   Call us if headaches, changes in vision, chest pain, etc.

## 2017-12-25 VITALS
BODY MASS INDEX: 28.01 KG/M2 | WEIGHT: 152.2 LBS | SYSTOLIC BLOOD PRESSURE: 136 MMHG | HEIGHT: 62 IN | HEART RATE: 91 BPM | DIASTOLIC BLOOD PRESSURE: 90 MMHG

## 2018-01-02 PROBLEM — R03.0 ELEVATED BLOOD PRESSURE READING WITHOUT DIAGNOSIS OF HYPERTENSION: Status: ACTIVE | Noted: 2017-12-21

## 2018-01-02 PROBLEM — O09.519 HIGH-RISK PREGNANCY, ELDERLY PRIMIGRAVIDA: Status: ACTIVE | Noted: 2017-12-21

## 2018-01-04 ENCOUNTER — OFFICE VISIT (OUTPATIENT)
Dept: OBGYN | Facility: CLINIC | Age: 38
End: 2018-01-04
Attending: ADVANCED PRACTICE MIDWIFE
Payer: COMMERCIAL

## 2018-01-04 VITALS
DIASTOLIC BLOOD PRESSURE: 90 MMHG | SYSTOLIC BLOOD PRESSURE: 144 MMHG | HEIGHT: 62 IN | WEIGHT: 151.6 LBS | HEART RATE: 91 BPM | BODY MASS INDEX: 27.9 KG/M2

## 2018-01-04 DIAGNOSIS — Z36.89 ENCOUNTER FOR FETAL ANATOMIC SURVEY: ICD-10-CM

## 2018-01-04 DIAGNOSIS — O09.519 HIGH-RISK PREGNANCY, ELDERLY PRIMIGRAVIDA: ICD-10-CM

## 2018-01-04 DIAGNOSIS — O09.519 HIGH-RISK PREGNANCY, ELDERLY PRIMIGRAVIDA: Primary | ICD-10-CM

## 2018-01-04 DIAGNOSIS — O10.919 CHRONIC HYPERTENSION AFFECTING PREGNANCY: ICD-10-CM

## 2018-01-04 PROCEDURE — G0463 HOSPITAL OUTPT CLINIC VISIT: HCPCS | Mod: 25,ZF

## 2018-01-04 PROCEDURE — 87491 CHLMYD TRACH DNA AMP PROBE: CPT | Performed by: ADVANCED PRACTICE MIDWIFE

## 2018-01-04 PROCEDURE — 87591 N.GONORRHOEAE DNA AMP PROB: CPT | Performed by: ADVANCED PRACTICE MIDWIFE

## 2018-01-04 PROCEDURE — 76815 OB US LIMITED FETUS(S): CPT | Mod: ZF

## 2018-01-04 ASSESSMENT — PAIN SCALES - GENERAL: PAINLEVEL: NO PAIN (0)

## 2018-01-04 NOTE — NURSING NOTE
Chief Complaint   Patient presents with     Prenatal Care   nob exam 9.1 weeks today minor nausea-      Seen dr. Huerta regarding blood pressure

## 2018-01-04 NOTE — PROGRESS NOTES
37 year old female, ,  Estimated Date of Delivery: Aug 8, 2018, 9w1d  presents for confirmation of dates and assessment of viability. This study was done transvaginally.    Measurements     CRL = 2.5mm = 9 1/7  weeks  EGA.   MARYANNE = 18.     Fetal anatomy appears normal for gestational age.     Fetal/Fetal Cardiac Activity: Present.  FHR = 167bpm.     Implantation: Intrauterine.     Cervix = 4.1 cm      Maternal structures appear normal.    Impression: Viable IUP at 9w1d        REGGIE Chan MD

## 2018-01-04 NOTE — LETTER
2018       RE: Rona Krishnamurthy  [address] [facility] [address]     Dear Colleague,    Thank you for referring your patient, Rona Krishnamurthy, to the [facility] at [facility]. Please see a copy of my visit note below.      SUBJECTIVE:    37 year old, female, , 9w1d,  who presents to the clinic today for a new ob visit.   Feels well. Has started PNV.  Estimated Date of Delivery: Aug 8, 2018   Aug 8, 2018 is calculated from dating ultrasound, not c/w LMP.  She has not had bleeding since her LMP.   She has had mild nausea which improved. Weight loss has occurred, for a total of <1 pound.   This was a planned pregnancy.   FOB is involved, . Tj     OTHER CONCERNS:    - BP average today: 142/88    - All BPs 166/90, 146/87, 137/89, 144/90   - Asymptomatic, baseline pre-e labs at [facility]   - Has been seen by Dr. Huerta, Follow up appt on    - To start aspirin 81mg/day at 12-14 weeks    ===========================================  ROS  PSYCHIATRIC:  Denies mood changes, difficulty concentrating, depression, anxiety, panic attacks or post partum depression  PHQ9= 0, GAD7=0    Social History   Substance Use Topics     Smoking status: Never Smoker     Smokeless tobacco: Never Used     Alcohol use No     History   Drug Use     Yes     Special: Marijuana     Comment: Occasional- before pregnancy     History   Smoking Status     Never Smoker   Smokeless Tobacco     Never Used       Alcohol use No     Family History   Problem Relation Age of Onset     Breast Cancer Other      Aunt - Dad's sister     Breast Cancer Other      Mom's grandma     Prostate Cancer Father      Hypertension Mother      Preeclampsia Mother      DIABETES Paternal Grandmother      DIABETES Paternal Grandfather      Hypertension Sister      Preeclampsia Sister      ============================================  MEDICAL HISTORY   Allergies   Allergen Reactions      "Nkda [No Known Drug Allergies]          Current Outpatient Prescriptions:      DiphenhydrAMINE HCl, Sleep, 50 MG CAPS, 50 mg as needed, Disp: , Rfl:      Prenatal Vit-Fe Fumarate-FA (PRENATAL MULTIVITAMIN PLUS IRON) 27-0.8 MG TABS per tablet, Take 1 tablet by mouth daily, Disp: , Rfl:      L-THEANINE PO, Take 200 mg by mouth daily, Disp: , Rfl:     Past Medical History:   Diagnosis Date     Atopic dermatitis 2005     Chronic hypertension        Past Surgical History:   Procedure Laterality Date     BREAST SURGERY  1998    fibroadenoma removal     HC TOOTH EXTRACTION W/FORCEP              Obstetric History       T0      L0     SAB0   TAB0   Ectopic0   Multiple0   Live Births0       # Outcome Date GA Lbr Danie/2nd Weight Sex Delivery Anes PTL Lv   1 Current                   GYN History- Pap 10/3/17 NIL with negative HPV    No history of abnormal paps    I personally reviewed the past social/family/medical and surgical history on the date of service.   I reviewed lab work done at Intake visit with patient.    OBJECTIVE:   PHYSICAL EXAM:  Pulse 91  Ht 1.575 m (5' 2.01\")  Wt 68.8 kg (151 lb 9.6 oz)  LMP 10/24/2017  BMI 27.72 kg/m2  BMI- Body mass index is 27.72 kg/(m^2)., GENERAL:  Pleasant pregnant female, alert, cooperative  and well groomed.  SKIN:  Warm and dry, without lesions or rashes  HEAD: Symmetrical features.  MOUTH:  Buccal mucosa pink, moist without lesions.  Teeth in good repair.    NECK:  Thyroid without enlargement and nodules.  Lymph nodes not palpable.   LUNGS:  Clear to auscultation.  BREAST: declined    HEART:  RRR without murmur.  ABDOMEN: Soft without masses , tenderness or organomegaly.  No CVA tenderness.  Uterus palpable at size equal to dates.  No scars noted.  Unable to auscultate heart tones via doppler. FHTS 167 by ultrasound.    MUSCULOSKELETAL:  Full range of motion  EXTREMITIES:  No edema. No significant varicosities.   PELVIC EXAM: declined  WET PREP:Not " done  GC/CHLAMYDIA CULTURE OBTAINED:YES, urine  Pap NIL with negative HPV    ASSESSMENT:  Intrauterine pregnancy 9w1d size consistent with dates  Genetic Screening: Level 2 Ultrasound only  Encounter Diagnoses   Name Primary?     High-risk pregnancy, elderly primigravida, [facility] MD  Yes     Encounter for fetal anatomic survey      Chronic hypertension affecting pregnancy         PLAN:  Orders Placed This Encounter   Procedures     Limited U/S + cervical Length - Transabdominal  35525     [facility] Referral [9046.022]     - Reviewed use of triage nurse line and contacting the on-call provider after hours for an urgent need such as fever, vagina bleeding, bladder or vaginal infection, rupture of membranes,  or term labor.    - Reviewed best evidence for: weight gain for her weight and height for pregnancy:    healthy diet and foods to avoid; exercise and activity during pregnancy;avoiding exposure to toxoplasmosis; and maintenance of a generally healthy lifestyle.   - Discussed the harms, benefits, side effects and alternative therapies for current prescribed and OTC medications.    - All pt's questions discussed and answered.  Pt verbalized understanding of and agreement to plan of care.     - Reviewed [facility] labs.  - GC/CT collected.  - Pap up to date 10/3/17 NIL, negative HPV.  - Patient's BPs will be reviewed today at HR Rounds with MD. Discussed HTN in pregnancy and recommendation for  testing and IOL. Discussed timing of delivery will be determined by diagnosis and pt status later in pregnancy.   - Pt agreeable to recommendation for 81mg aspirin daily starting 12-14 weeks.  - Has follow up appointment with Dr. Huerta on 18.  - Declines 1st trimester screening. Desires level 2 ultrasound only, [facility] referral placed.  - Continue scheduled prenatal care, RTC in approx 3 (on ) and prn if questions or concerns    NITHIN Welsh, CNM                 Again, thank you for  allowing me to participate in the care of your patient.      Sincerely,    Salud Alfaro CNM

## 2018-01-04 NOTE — MR AVS SNAPSHOT
After Visit Summary   1/4/2018    Rona Krishnamurthy    MRN: [id number]           Patient Information     Date Of Birth          1980        Visit Information        Provider Department      1/4/2018 9:00 AM [facility] ULTRASOUND [facility]        Today's Diagnoses     High-risk pregnancy, elderly primigravida, [facility] MD            Follow-ups after your visit        Your next 10 appointments already scheduled     Jan 22, 2018 10:00 AM CST   Return Visit with Dahlia Noriega MD   [facility]  ([facility])    [facility]  [address]  [address]  [address]  [facility] [place] [id number] [phone]            Jan 22, 2018 10:30 AM CST   RETURN OB with NITHIN Franco CNM   [facility] ([facility])    [facility] [address]  [address]  [address]  [facility] [place] [id number] [phone]              Future tests that were ordered for you today     Open Future Orders        Priority Expected Expires Ordered    [facility] Genetic Counseling Routine  1/4/2019 1/4/2018    [facility] US Comprehensive Single Routine  11/4/2018 1/4/2018            Who to contact     Please call your clinic at [phone] to:    Ask questions about your health    Make or cancel appointments    Discuss your medicines    Learn about your test results    Speak to your doctor   If you have compliments or concerns about an experience at your clinic, or if you wish to file a complaint, please contact [facility] Patient Relations at [phone] or email us at [email or web address]         Additional Information About Your Visit        MyChart Information     [employer] gives you secure access to your electronic health record. If you see a primary care provider, you can also send messages to your care team and make appointments. If you have questions, please call your primary  [facility] clinic.  If you do not have a primary care provider, please call [phone] and they will assist you.      in3Depth is an electronic gateway that provides easy, online access to your medical records. With in3Depth, you can request a clinic appointment, read your test results, renew a prescription or communicate with your care team.     To access your existing account, please contact your [facility] or call [phone] for assistance.        Care EveryWhere ID     This is your Care EveryWhere ID. This could be used by other organizations to access your [place] medical records  [id number]        Your Vitals Were     Last Period                   10/24/2017            Blood Pressure from Last 3 Encounters:   01/04/18 144/90   12/22/17 129/83   12/21/17 136/90    Weight from Last 3 Encounters:   01/04/18 68.8 kg (151 lb 9.6 oz)   12/22/17 69.8 kg (153 lb 12.8 oz)   12/21/17 69 kg (152 lb 3.2 oz)              We Performed the Following     Limited U/S + cervical Length - Transabdominal  00653          Today's Medication Changes          These changes are accurate as of: 1/4/18  5:20 PM.  If you have any questions, ask your nurse or doctor.               Stop taking these medicines if you haven't already. Please contact your care team if you have questions.     doxylamine 25 MG Tabs tablet   Commonly known as:  UNISOM   Stopped by:  Salud Alfaro CNM                    Primary Care Provider Office Phone # Fax #    Mamadou Canales [phone]       [address] [place]  [facility] [address]        Equal Access to Services     [facility] Center[facility]OBERT : Hadii mak owen hadashpeewee Sokaela, waaxda luqadaha, qaybta kaalmada kim forte. So [facility] [phone].    ATENCIÓN: Si habla español, tiene a trevizo disposición servicios gratuitos de asistencia lingüística. Llame al [phone].    We comply with applicable federal civil  rights laws and [place] laws. We do not discriminate on the basis of race, color, national origin, age, disability, sex, sexual orientation, or gender identity.            Thank you!     Thank you for choosing [facility]  for your care. Our goal is always to provide you with excellent care. Hearing back from our patients is one way we can continue to improve our services. Please take a few minutes to complete the written survey that you may receive in the mail after your visit with us. Thank you!             Your Updated Medication List - Protect others around you: Learn how to safely use, store and throw away your medicines at www.disposemymeds.org.          This list is accurate as of: 1/4/18  5:20 PM.  Always use your most recent med list.                   Brand Name Dispense Instructions for use Diagnosis    DiphenhydrAMINE HCl (Sleep) 50 MG Caps      50 mg as needed        L-THEANINE PO      Take 200 mg by mouth daily        prenatal multivitamin plus iron 27-0.8 MG Tabs per tablet      Take 1 tablet by mouth daily

## 2018-01-04 NOTE — PATIENT INSTRUCTIONS
"  Thank you for choosing [facility] for your obstetrical care.  We are an integrated health clinic with obstetricians, midwives, a psychologist, an acupuncturist, a nutritionist, a pharmacist, internal medicine and family practice all in one.  If you have questions about services offered please ask.      o Please keep all appointments with your provider.  You will help catch, prevent and treat problems early.  o Review your Expectant Family booklet and folder given to you at this intake visit.  It can answer many basic questions including:    Treatment for nausea and vomiting    Medications that are safe in pregnancy    Healthy diet and weight gain    Exercise and activity  o ASK questions!!  Please use \"Questions for my New OB visit\" form to write down any questions or concerns for your next visit.  o Eat a healthy diet.  Visit www.choosemyplate.gov and click on  Pregnancy and Breastfeeding  for information and tips  o Do not smoke.  Avoid other people's smoke, too.  We are happy to help with referrals to stop smoking programs.  o Do not drink alcohol.  o Try to avoid people who have colds or other infections.  Practice good hand washing.  o Consider registering for our Healthy Pregnancy Class here at [facility].  This class is offered every 3rd Wednesday from 2:30-4:30 p.m.  [place] at [phone] or online at [email or web address]  o Consider registering for prenatal education classes through [place] at [facility].  You can view class schedules and register online at [email or web address] or call [phone] for questions     For urgent concerns, call [facility] at [phone] to speak with a triage nurse during regular clinic hours (8:00 am - 4:30 pm).  This line is answered by our service 24 hours a day, after hours a provider will return your call.  "

## 2018-01-04 NOTE — PROGRESS NOTES
SUBJECTIVE:    37 year old, female, , 9w1d,  who presents to the clinic today for a new ob visit.   Feels well. Has started PNV.  Estimated Date of Delivery: Aug 8, 2018   Aug 8, 2018 is calculated from dating ultrasound, not c/w LMP.  She has not had bleeding since her LMP.   She has had mild nausea which improved. Weight loss has occurred, for a total of <1 pound.   This was a planned pregnancy.   FOB is involved, . Tj     OTHER CONCERNS:    - BP average today: 142/88    - All BPs 166/90, 146/87, 137/89, 144/90   - Asymptomatic, baseline pre-e labs at [facility]   - Has been seen by Dr. Huerta, Follow up appt on    - To start aspirin 81mg/day at 12-14 weeks    ===========================================  ROS  PSYCHIATRIC:  Denies mood changes, difficulty concentrating, depression, anxiety, panic attacks or post partum depression  PHQ9= 0, GAD7=0    Social History   Substance Use Topics     Smoking status: Never Smoker     Smokeless tobacco: Never Used     Alcohol use No     History   Drug Use     Yes     Special: Marijuana     Comment: Occasional- before pregnancy     History   Smoking Status     Never Smoker   Smokeless Tobacco     Never Used       Alcohol use No     Family History   Problem Relation Age of Onset     Breast Cancer Other      Aunt - Dad's sister     Breast Cancer Other      Mom's grandma     Prostate Cancer Father      Hypertension Mother      Preeclampsia Mother      DIABETES Paternal Grandmother      DIABETES Paternal Grandfather      Hypertension Sister      Preeclampsia Sister      ============================================  MEDICAL HISTORY   Allergies   Allergen Reactions     Nkda [No Known Drug Allergies]          Current Outpatient Prescriptions:      DiphenhydrAMINE HCl, Sleep, 50 MG CAPS, 50 mg as needed, Disp: , Rfl:      Prenatal Vit-Fe Fumarate-FA (PRENATAL MULTIVITAMIN PLUS IRON) 27-0.8 MG TABS per tablet, Take 1 tablet by mouth daily, Disp: , Rfl:       "L-THEANINE PO, Take 200 mg by mouth daily, Disp: , Rfl:     Past Medical History:   Diagnosis Date     Atopic dermatitis 2005     Chronic hypertension        Past Surgical History:   Procedure Laterality Date     BREAST SURGERY  1998    fibroadenoma removal     HC TOOTH EXTRACTION W/FORCEP              Obstetric History       T0      L0     SAB0   TAB0   Ectopic0   Multiple0   Live Births0       # Outcome Date GA Lbr Danie/2nd Weight Sex Delivery Anes PTL Lv   1 Current                   GYN History- Pap 10/3/17 NIL with negative HPV    No history of abnormal paps    I personally reviewed the past social/family/medical and surgical history on the date of service.   I reviewed lab work done at Intake visit with patient.    OBJECTIVE:   PHYSICAL EXAM:  Pulse 91  Ht 1.575 m (5' 2.01\")  Wt 68.8 kg (151 lb 9.6 oz)  LMP 10/24/2017  BMI 27.72 kg/m2  BMI- Body mass index is 27.72 kg/(m^2)., GENERAL:  Pleasant pregnant female, alert, cooperative  and well groomed.  SKIN:  Warm and dry, without lesions or rashes  HEAD: Symmetrical features.  MOUTH:  Buccal mucosa pink, moist without lesions.  Teeth in good repair.    NECK:  Thyroid without enlargement and nodules.  Lymph nodes not palpable.   LUNGS:  Clear to auscultation.  BREAST: declined    HEART:  RRR without murmur.  ABDOMEN: Soft without masses , tenderness or organomegaly.  No CVA tenderness.  Uterus palpable at size equal to dates.  No scars noted.  Unable to auscultate heart tones via doppler. FHTS 167 by ultrasound.    MUSCULOSKELETAL:  Full range of motion  EXTREMITIES:  No edema. No significant varicosities.   PELVIC EXAM: declined  WET PREP:Not done  GC/CHLAMYDIA CULTURE OBTAINED:YES, urine  Pap NIL with negative HPV    ASSESSMENT:  Intrauterine pregnancy 9w1d size consistent with dates  Genetic Screening: Level 2 Ultrasound only  Encounter Diagnoses   Name Primary?     High-risk pregnancy, elderly primigravida, LEATHA JACKSON  Yes     " Encounter for fetal anatomic survey      Chronic hypertension affecting pregnancy         PLAN:  Orders Placed This Encounter   Procedures     Limited U/S + cervical Length - Transabdominal  17216     [facility] Referral [9046.022]     - Reviewed use of triage nurse line and contacting the on-call provider after hours for an urgent need such as fever, vagina bleeding, bladder or vaginal infection, rupture of membranes,  or term labor.    - Reviewed best evidence for: weight gain for her weight and height for pregnancy:    healthy diet and foods to avoid; exercise and activity during pregnancy;avoiding exposure to toxoplasmosis; and maintenance of a generally healthy lifestyle.   - Discussed the harms, benefits, side effects and alternative therapies for current prescribed and OTC medications.    - All pt's questions discussed and answered.  Pt verbalized understanding of and agreement to plan of care.     - Reviewed O[facility] labs.  - GC/CT collected.  - Pap up to date 10/3/17 NIL, negative HPV.  - Patient's BPs will be reviewed today at HR Rounds with MD. Discussed HTN in pregnancy and recommendation for  testing and IOL. Discussed timing of delivery will be determined by diagnosis and pt status later in pregnancy.   - Pt agreeable to recommendation for 81mg aspirin daily starting 12-14 weeks.  - Has follow up appointment with Dr. Huerta on 18.  - Declines 1st trimester screening. Desires level 2 ultrasound only, MFM referral placed.  - Continue scheduled prenatal care, RTC in approx 3 (on ) and prn if questions or concerns    NITHIN Welsh, GLENNA

## 2018-01-04 NOTE — LETTER
2018       RE: Rona Krishnamurthy  [address] [facility] [address]     Dear Colleague,    Thank you for referring your patient, Rona Krishnamurthy, to the [facility] at [facility]. Please see a copy of my visit note below.    37 year old female, ,  Estimated Date of Delivery: Aug 8, 2018, 9w1d  presents for confirmation of dates and assessment of viability. This study was done transvaginally.    Measurements     CRL = 2.5mm = 9 1/7  weeks  EGA.   MARYANNE = 18.     Fetal anatomy appears normal for gestational age.     Fetal/Fetal Cardiac Activity: Present.  FHR = 167bpm.     Implantation: Intrauterine.     Cervix = 4.1 cm      Maternal structures appear normal.    Impression: Viable IUP at 9w1d        REGGIE Chan MD

## 2018-01-04 NOTE — MR AVS SNAPSHOT
"              After Visit Summary   1/4/2018    Rona Krishnamurthy    MRN: [id number]           Patient Information     Date Of Birth          1980        Visit Information        Provider Department      1/4/2018 10:00 AM Salud lAfaro CNM [facility]        Today's Diagnoses     High-risk pregnancy, elderly primigravida, [facility] MD     -  1    Encounter for fetal anatomic survey        Chronic hypertension affecting pregnancy          Care Instructions      Thank you for choosing [facility] ([facility]) for your obstetrical care.  We are an integrated health clinic with obstetricians, midwives, a psychologist, an acupuncturist, a nutritionist, a pharmacist, internal medicine and family practice all in one.  If you have questions about services offered please ask.      o Please keep all appointments with your provider.  You will help catch, prevent and treat problems early.  o Review your Expectant Family booklet and folder given to you at this intake visit.  It can answer many basic questions including:    Treatment for nausea and vomiting    Medications that are safe in pregnancy    Healthy diet and weight gain    Exercise and activity  o ASK questions!!  Please use \"Questions for my New OB visit\" form to write down any questions or concerns for your next visit.  o Eat a healthy diet.  Visit www.choosemyplate.gov and click on  Pregnancy and Breastfeeding  for information and tips  o Do not smoke.  Avoid other people's smoke, too.  We are happy to help with referrals to stop smoking programs.  o Do not drink alcohol.  o Try to avoid people who have colds or other infections.  Practice good hand washing.  o Consider registering for our Healthy Pregnancy Class here at [facility].  This class is offered every 3rd Wednesday from 2:30-4:30 p.m.  [place] at [phone] or online at [email or web address] or [email or web address]  o Consider registering " for prenatal education classes through [facility] at [facility].  You can view class schedules and register online at [email or web address] or call [phone] for questions     For urgent concerns, call [facility] at [phone] to speak with a triage nurse during regular clinic hours (8:00 am - 4:30 pm).  This line is answered by our service 24 hours a day, after hours a provider will return your call.          Follow-ups after your visit        Additional Services     Maternal Fetal Medicine Center Referral [9046.022]       >> Patient may proceed with recommendations for further testing as directed by the Maternal Fetal Medicine Specialist >>    >> If requesting Fetal Echo: MFM will determine appropriate location for exam due to indication.    >> If requesting Lung Maturity Amnio:  If results indicate fetal lung maturity, induction or C/S is recommended within 36 hours.  Please schedule accordingly.     Dear Patient:   Please be aware that coverage of these services is subject to the terms and limitations of your health insurance plan.  Call member services at your health plan with any benefit or coverage questions.      Please bring the following to your appointment:    >>  Any x-rays, CTs or MRIs which have been performed.  Contact the facility where they were done to arrange for  prior to your scheduled appointment.  Any new CT, MRI or other procedures ordered by your specialist must be performed at a [facility] facility or coordinated by your clinic's referral office.  >>  List of current medications   >>  This referral request   >>  Any documents/labs given to you for this referral                  Follow-up notes from your care team     Return for LACHO- MD or resident for this appt- on 1/22 after malia appointment.      Your next 10 appointments already scheduled     Jan 22, 2018 10:00 AM CST   Return Visit with Dahlia Noriega MD   [facility]   ([facility])    [facility] [place] [facility]  [address]  [address]  [address]  [facility] [id number] [phone]            Jan 22, 2018 10:30 AM CST   RETURN OB with NITHIN Franco CNM   [facility] ([facility])    [facility] [place] [facility] [address]  [address]  [address]  [facility] [id number] [phone]              Future tests that were ordered for you today     Open Future Orders        Priority Expected Expires Ordered    [facility] Genetic Counseling Routine  1/4/2019 1/4/2018    [facility] US Comprehensive Single Routine  11/4/2018 1/4/2018            Who to contact     Please call your clinic at [phone] to:    Ask questions about your health    Make or cancel appointments    Discuss your medicines    Learn about your test results    Speak to your doctor   If you have compliments or concerns about an experience at your clinic, or if you wish to file a complaint, please contact [facility] Patient Relations at [phone] or email us at [email or web address]         Additional Information About Your Visit        [employer] Information     Loomio gives you secure access to your electronic health record. If you see a primary care provider, you can also send messages to your care team and make appointments. If you have questions, please call your primary care clinic.  If you do not have a primary care provider, please call [phone] and they will assist you.      Loomio is an electronic gateway that provides easy, online access to your medical records. With Loomio, you can request a clinic appointment, read your test results, renew a prescription or communicate with your care team.     To access your existing account, please contact your [facility] or call [phone] for assistance.        Care EveryWhere ID     This is your Care EveryWhere ID. This could be used by other  "organizations to access your [place] medical records  [id number]        Your Vitals Were     Pulse Height Last Period BMI (Body Mass Index)          91 1.575 m (5' 2.01\") 10/24/2017 27.72 kg/m2         Blood Pressure from Last 3 Encounters:   01/04/18 144/90   12/22/17 129/83   12/21/17 136/90    Weight from Last 3 Encounters:   01/04/18 68.8 kg (151 lb 9.6 oz)   12/22/17 69.8 kg (153 lb 12.8 oz)   12/21/17 69 kg (152 lb 3.2 oz)              We Performed the Following     Chlamydia Trachomatis PCR [MYP307]     Gonorrhea PCR [TUH4126]     Maternal Fetal Medicine Center Referral [9046.022]          Today's Medication Changes          These changes are accurate as of: 1/4/18 11:59 PM.  If you have any questions, ask your nurse or doctor.               Stop taking these medicines if you haven't already. Please contact your care team if you have questions.     doxylamine 25 MG Tabs tablet   Commonly known as:  UNISOM   Stopped by:  Salud Alfaro CNM                    Primary Care Provider Office Phone # Fax #    Mamadou Canales [phone]       [address] [facility] [address]        Equal Access to Services     [facility] AH: Hadii mak ku hadasho Soomaali, waaxda luqadaha, qaybta kaalmada adeegyada, kim bryan. So [facility] [phone].    ATENCIÓN: Si habla español, tiene a trevizo disposición servicios gratuitos de asistencia lingüística. Llame al [phone].    We comply with applicable federal civil rights laws and [place] laws. We do not discriminate on the basis of race, color, national origin, age, disability, sex, sexual orientation, or gender identity.            Thank you!     Thank you for choosing [facility]  for your care. Our goal is always to provide you with excellent care. Hearing back from our patients is one way we can continue to improve our services. Please take a few minutes to complete the written " survey that you may receive in the mail after your visit with us. Thank you!             Your Updated Medication List - Protect others around you: Learn how to safely use, store and throw away your medicines at www.disposemymeds.org.          This list is accurate as of: 1/4/18 11:59 PM.  Always use your most recent med list.                   Brand Name Dispense Instructions for use Diagnosis    DiphenhydrAMINE HCl (Sleep) 50 MG Caps      50 mg as needed        L-THEANINE PO      Take 200 mg by mouth daily        prenatal multivitamin plus iron 27-0.8 MG Tabs per tablet      Take 1 tablet by mouth daily

## 2018-01-05 ASSESSMENT — ANXIETY QUESTIONNAIRES
IF YOU CHECKED OFF ANY PROBLEMS ON THIS QUESTIONNAIRE, HOW DIFFICULT HAVE THESE PROBLEMS MADE IT FOR YOU TO DO YOUR WORK, TAKE CARE OF THINGS AT HOME, OR GET ALONG WITH OTHER PEOPLE: NOT DIFFICULT AT ALL
GAD7 TOTAL SCORE: 0
6. BECOMING EASILY ANNOYED OR IRRITABLE: NOT AT ALL
2. NOT BEING ABLE TO STOP OR CONTROL WORRYING: NOT AT ALL
1. FEELING NERVOUS, ANXIOUS, OR ON EDGE: NOT AT ALL
7. FEELING AFRAID AS IF SOMETHING AWFUL MIGHT HAPPEN: NOT AT ALL
3. WORRYING TOO MUCH ABOUT DIFFERENT THINGS: NOT AT ALL
5. BEING SO RESTLESS THAT IT IS HARD TO SIT STILL: NOT AT ALL

## 2018-01-05 ASSESSMENT — PATIENT HEALTH QUESTIONNAIRE - PHQ9
5. POOR APPETITE OR OVEREATING: NOT AT ALL
SUM OF ALL RESPONSES TO PHQ QUESTIONS 1-9: 0

## 2018-01-08 LAB
C TRACH DNA SPEC QL NAA+PROBE: NEGATIVE
N GONORRHOEA DNA SPEC QL NAA+PROBE: NEGATIVE
SPECIMEN SOURCE: NORMAL
SPECIMEN SOURCE: NORMAL

## 2018-01-22 ENCOUNTER — OFFICE VISIT (OUTPATIENT)
Dept: INTERNAL MEDICINE | Facility: CLINIC | Age: 38
End: 2018-01-22
Attending: INTERNAL MEDICINE
Payer: COMMERCIAL

## 2018-01-22 ENCOUNTER — OFFICE VISIT (OUTPATIENT)
Dept: OBGYN | Facility: CLINIC | Age: 38
End: 2018-01-22
Attending: ADVANCED PRACTICE MIDWIFE
Payer: COMMERCIAL

## 2018-01-22 VITALS
BODY MASS INDEX: 28.52 KG/M2 | DIASTOLIC BLOOD PRESSURE: 87 MMHG | WEIGHT: 155 LBS | HEART RATE: 93 BPM | SYSTOLIC BLOOD PRESSURE: 137 MMHG | HEIGHT: 62 IN

## 2018-01-22 VITALS
BODY MASS INDEX: 28.35 KG/M2 | WEIGHT: 155 LBS | DIASTOLIC BLOOD PRESSURE: 89 MMHG | SYSTOLIC BLOOD PRESSURE: 135 MMHG | HEART RATE: 93 BPM

## 2018-01-22 DIAGNOSIS — O09.519 HIGH-RISK PREGNANCY, ELDERLY PRIMIGRAVIDA: Primary | ICD-10-CM

## 2018-01-22 DIAGNOSIS — R03.0 ELEVATED BLOOD PRESSURE READING WITHOUT DIAGNOSIS OF HYPERTENSION: Primary | ICD-10-CM

## 2018-01-22 PROBLEM — O10.919 CHRONIC HYPERTENSION AFFECTING PREGNANCY: Status: RESOLVED | Noted: 2017-12-21 | Resolved: 2018-01-22

## 2018-01-22 PROCEDURE — G0463 HOSPITAL OUTPT CLINIC VISIT: HCPCS | Mod: ZF

## 2018-01-22 NOTE — PROGRESS NOTES
"                         SUBJECTIVE:  Rona Krishnamurthy is a 37 year old female who comes in for f/u on BP. It has been slowly rising over the course of her last few visits here. She has been checking her blood pressure at home, and it has been 120s over 80s.  She has been checking it a couple of times per week.  She denies any headaches, changes in vision, numbness/tingling, chest pain, lower extremity edema.      Medications and allergies reviewed by me today.     ROS:   Constitutional, HEENT, cardiovascular, pulmonary, gi and gu systems are negative, except as otherwise noted.      OBJECTIVE:  /87  Pulse 93  Ht 1.575 m (5' 2\")  Wt 70.3 kg (155 lb)  LMP 10/24/2017  Breastfeeding? No  BMI 28.35 kg/m2   Wt Readings from Last 1 Encounters:   01/22/18 70.3 kg (155 lb)     General: Pleasant female, in no apparent distress  Cardiovascular: Heart regular rate and rhythm, no murmurs rubs gallops  Respiratory: Lungs clear to auscultation bilaterally  Extremities: Warm well perfused, no lower extremity edema     ASSESSMENT/PLAN:    Rona was seen today for recheck.    Diagnoses and all orders for this visit:    Elevated blood pressure reading without diagnosis of hypertension  Blood pressure is slowly rising, over her last office visits.  However, her blood pressure at home remains in the 120s/80s.  I have advised her to continue to check her blood pressure at home, but on a more regular basis, closer to daily.  I have also advised her to take her blood pressure at various times during the day, and record these.  At her next visit, I have requested that she bring her blood pressure machine with her, so that we can ensure that her blood pressure machine at home is adequately reflecting her blood pressure measured here.    If her blood pressure machine from home calibrates well to her blood pressure machine here, I will advise that we follow her blood pressures from her home machine as it does appear that she " has a significant element of white coat hypertension,, based on her 24-hour blood pressure study which was done in October 2017.    However, given her family history of her mother and sister having preeclampsia, along with her nulliparity, and age greater than 35, she does have several moderate risk factors for preeclampsia, and I would advise that she start on a baby aspirin at 12 weeks, as she is at increased risk for preeclampsia, and also gestational hypertension.    I will see her back in 4 weeks.If her blood pressure were to arise in the meantime, and her machine does not calibrate well with the clinics, and advised that we start her on labetalol, when she gets to an average blood pressure of 150/90.    Pt should return to clinic for f/u with me in 4 weeks    >50% of this 15 min visit spent in patient education and counseling.        Dahlia Noriega MD  01/22/18

## 2018-01-22 NOTE — PROGRESS NOTES
Subjective:     37 year old  at 11w5d presents for a routine prenatal appointment.       No vaginal bleeding or leakage of fluid.  No contractions or cramping. No fetal movement yet. Discussed quickening.     No HA, visual changes, RUQ or epigastric pain.     The patient presents with the following concerns: None, feeling well. Just had a visit with Dr. Huerta. Will start ASA next week and continue to f/u with Dr. Huerta monthly. No other questions or concerns.     Level II US  Scheduled.     Declines all genetic testing.      Objective:  Vitals:    18 1039   BP: 135/89   Pulse: 93   Weight: 70.3 kg (155 lb)     See OB flowsheet    Assessment/Plan     Encounter Diagnosis   Name Primary?     High-risk pregnancy, elderly primigravida Yes     - Reviewed total weight gain, encouraged continued healthy diet and exercise.      - Reviewed why/how to contact provider.   -Patient education/orders or handouts today:  Fetal movement and Level 2 u/s scheduled  -Return to clinic in 4 weeks and prn if questions or concerns.     NITHIN Claudio CNM

## 2018-01-22 NOTE — PATIENT INSTRUCTIONS
Start checking blood pressure daily; at different times. Record readings and time of reading  Bring machine to next visit for calibration  Start baby aspirin at 12 weeks.

## 2018-01-22 NOTE — LETTER
"1/22/2018       RE: Rona Krishnamurthy  [address] [facility] [address]     Dear Colleague,    Thank you for referring your patient, Rona Krishnamurthy, to the [facility]  at [facility]. Please see a copy of my visit note below.                             SUBJECTIVE:  Rona Krishnamurthy is a 37 year old female who comes in for f/u on BP. It has been slowly rising over the course of her last few visits here. She has been checking her blood pressure at home, and it has been 120s over 80s.  She has been checking it a couple of times per week.  She denies any headaches, changes in vision, numbness/tingling, chest pain, lower extremity edema.      Medications and allergies reviewed by me today.     ROS:   Constitutional, HEENT, cardiovascular, pulmonary, gi and gu systems are negative, except as otherwise noted.      OBJECTIVE:  /87  Pulse 93  Ht 1.575 m (5' 2\")  Wt 70.3 kg (155 lb)  LMP 10/24/2017  Breastfeeding? No  BMI 28.35 kg/m2   Wt Readings from Last 1 Encounters:   01/22/18 70.3 kg (155 lb)     General: Pleasant female, in no apparent distress  Cardiovascular: Heart regular rate and rhythm, no murmurs rubs gallops  Respiratory: Lungs clear to auscultation bilaterally  Extremities: Warm well perfused, no lower extremity edema     ASSESSMENT/PLAN:    Rona was seen today for recheck.    Diagnoses and all orders for this visit:    Elevated blood pressure reading without diagnosis of hypertension  Blood pressure is slowly rising, over her last office visits.  However, her blood pressure at home remains in the 120s/80s.  I have advised her to continue to check her blood pressure at home, but on a more regular basis, closer to daily.  I have also advised her to take her blood pressure at various times during the day, and record these.  At her next visit, I have requested that she bring her blood pressure machine with her, so that we " can ensure that her blood pressure machine at home is adequately reflecting her blood pressure measured here.    If her blood pressure machine from home calibrates well to her blood pressure machine here, I will advise that we follow her blood pressures from her home machine as it does appear that she has a significant element of white coat hypertension,, based on her 24-hour blood pressure study which was done in October 2017.    However, given her family history of her mother and sister having preeclampsia, along with her nulliparity, and age greater than 35, she does have several moderate risk factors for preeclampsia, and I would advise that she start on a baby aspirin at 12 weeks, as she is at increased risk for preeclampsia, and also gestational hypertension.    I will see her back in 4 weeks.If her blood pressure were to arise in the meantime, and her machine does not calibrate well with the clinics, and advised that we start her on labetalol, when she gets to an average blood pressure of 150/90.    Pt should return to clinic for f/u with me in 4 weeks    >50% of this 15 min visit spent in patient education and counseling.        Dahlia Noriega MD  01/22/18

## 2018-01-22 NOTE — MR AVS SNAPSHOT
After Visit Summary   1/22/2018    Rona Krishnamurthy    MRN: [id number]           Patient Information     Date Of Birth          1980        Visit Information        Provider Department      1/22/2018 10:30 AM Brandy Ramos APRN [facility]        Today's Diagnoses     High-risk pregnancy, elderly primigravida    -  1       Follow-ups after your visit        Follow-up notes from your care team     Return in about 4 weeks (around 2/19/2018) for Return OB.      Your next 10 appointments already scheduled     Feb 19, 2018  9:00 AM CST   Return Visit with Dahlia Noriega MD   [facility]  ([facility])    [facility]  [address]  [address]  [facility]  [facility] [address] [phone]            Feb 19, 2018  9:30 AM CST   RETURN OB with ANTONIO Juarez   [facility] ([facility])    [facility] [address]  [address]  [address]  [facility] [address] [phone]            Mar 07, 2018 10:15 AM CST   (Arrive by 10:00 AM)   MEHDI US COMP with [id number]   [facility])    [address]  [facility] [id number] [phone]           Wear comfortable clothes and leave your valuables at home.            Mar 07, 2018 10:45 AM CST   Radiology MD with UR MEHDI JACKSON   [facility])    [address]  [facility] [address]   [phone]           Please arrive at the time given for your first appointment. This visit is used internally to schedule the physician's time during your ultrasound.              Who to contact     Please call your clinic at [phone] to:    Ask questions about your health    Make or cancel appointments    Discuss your  medicines    Learn about your test results    Speak to your doctor   If you have compliments or concerns about an experience at your clinic, or if you wish to file a complaint, please contact [facility] Patient Relations at [phone] or email us at [email or web address]         Additional Information About Your Visit        MyChart Information     [employer] gives you secure access to your electronic health record. If you see a primary care provider, you can also send messages to your care team and make appointments. If you have questions, please call your primary care clinic.  If you do not have a primary care provider, please call [phone] and they will assist you.      Planex is an electronic gateway that provides easy, online access to your medical records. With [employer], you can request a clinic appointment, read your test results, renew a prescription or communicate with your care team.     To access your existing account, please contact your [facility] or call [phone] for assistance.        Care EveryWhere ID     This is your Care EveryWhere ID. This could be used by other organizations to access your [place] medical records  [id number]        Your Vitals Were     Pulse Last Period BMI (Body Mass Index)             93 10/24/2017 28.35 kg/m2          Blood Pressure from Last 3 Encounters:   01/22/18 135/89   01/22/18 137/87   01/04/18 144/90    Weight from Last 3 Encounters:   01/22/18 70.3 kg (155 lb)   01/22/18 70.3 kg (155 lb)   01/04/18 68.8 kg (151 lb 9.6 oz)              Today, you had the following     No orders found for display       Primary Care Provider Office Phone # Fax #    Mamadou Canales [phone]       [address]  [facility] [address]        Equal Access to Services     BERT ACUNA AH: Liset Prabhakar, chana reyes, qakim lofton  mariann romero ah. So [facility] [phone].    ATENCIÓN: Si habla heraclio, tiene a trevizo disposición servicios gratuitos de asistencia lingüística. Jean-Claude al [phone].    We comply with applicable federal civil rights laws and [place] laws. We do not discriminate on the basis of race, color, national origin, age, disability, sex, sexual orientation, or gender identity.            Thank you!     Thank you for choosing [facility]  for your care. Our goal is always to provide you with excellent care. Hearing back from our patients is one way we can continue to improve our services. Please take a few minutes to complete the written survey that you may receive in the mail after your visit with us. Thank you!             Your Updated Medication List - Protect others around you: Learn how to safely use, store and throw away your medicines at www.disposemymeds.org.          This list is accurate as of: 1/22/18  8:42 PM.  Always use your most recent med list.                   Brand Name Dispense Instructions for use Diagnosis    DiphenhydrAMINE HCl (Sleep) 50 MG Caps      50 mg as needed        L-THEANINE PO      Take 200 mg by mouth daily        prenatal multivitamin plus iron 27-0.8 MG Tabs per tablet      Take 1 tablet by mouth daily

## 2018-01-22 NOTE — MR AVS SNAPSHOT
After Visit Summary   1/22/2018    Rona Krishnamurthy    MRN: [id number]           Patient Information     Date Of Birth          1980        Visit Information        Provider Department      1/22/2018 10:00 AM Dahlia Villa MD [facility]         Care Instructions    Start checking blood pressure daily; at different times. Record readings and time of reading  Bring machine to next visit for calibration  Start baby aspirin at 12 weeks.             Follow-ups after your visit        Your next 10 appointments already scheduled     Mar 07, 2018 10:15 AM CST   (Arrive by 10:00 AM)   MEHDI FOLEY COMP with URMFMUSR1   [facility]etal [facility] Ultrasound - [facility])    [address]  [facility] [address] [phone]           Wear comfortable clothes and leave your valuables at home.            Mar 07, 2018 10:45 AM CST   Radiology MD with UR MEHDI JACKSON   [facility]etal [facility] - [facility])    [address]  [facility] [address] [phone]           Please arrive at the time given for your first appointment. This visit is used internally to schedule the physician's time during your ultrasound.              Who to contact     Please call your clinic at [phone] to:    Ask questions about your health    Make or cancel appointments    Discuss your medicines    Learn about your test results    Speak to your doctor   If you have compliments or concerns about an experience at your clinic, or if you wish to file a complaint, please contact [facility] Patient Relations at [phone] or email us at [email or web address]         Additional Information About Your Visit        MyChart Information     [employer] gives you secure access to your electronic health record. If you see a primary care provider, you can  "also send messages to your care team and make appointments. If you have questions, please call your primary care clinic.  If you do not have a primary care provider, please call [phone] and they will assist you.      [employer] is an electronic gateway that provides easy, online access to your medical records. With [employer], you can request a clinic appointment, read your test results, renew a prescription or communicate with your care team.     To access your existing account, please contact your [facility] or call [phone] for assistance.        Care EveryWhere ID     This is your Care EveryWhere ID. This could be used by other organizations to access your [place] medical records  [id number]        Your Vitals Were     Pulse Height Last Period Breastfeeding? BMI (Body Mass Index)       93 1.575 m (5' 2\") 10/24/2017 No 28.35 kg/m2        Blood Pressure from Last 3 Encounters:   01/22/18 137/87   01/04/18 144/90   12/22/17 129/83    Weight from Last 3 Encounters:   01/22/18 70.3 kg (155 lb)   01/04/18 68.8 kg (151 lb 9.6 oz)   12/22/17 69.8 kg (153 lb 12.8 oz)              Today, you had the following     No orders found for display       Primary Care Provider Office Phone # Fax #    Mamadou Canales [phone]       [address]  [facility] [address]        Equal Access to Services     [facility]: Hadii aad ku hadasho Soomaali, waaxda luqadaha, qaybta kaalmada adeegyada, kim romero . So [facility] [phone].    ATENCIÓN: Si habla español, tiene a trevizo disposición servicios gratuitos de asistencia lingüística. Llame al [phone].    We comply with applicable federal civil rights laws and [place] laws. We do not discriminate on the basis of race, color, national origin, age, disability, sex, sexual orientation, or gender identity.            Thank you!     Thank you for choosing [facility]   for " your care. Our goal is always to provide you with excellent care. Hearing back from our patients is one way we can continue to improve our services. Please take a few minutes to complete the written survey that you may receive in the mail after your visit with us. Thank you!             Your Updated Medication List - Protect others around you: Learn how to safely use, store and throw away your medicines at www.disposemymeds.org.          This list is accurate as of: 1/22/18 10:36 AM.  Always use your most recent med list.                   Brand Name Dispense Instructions for use Diagnosis    DiphenhydrAMINE HCl (Sleep) 50 MG Caps      50 mg as needed        L-THEANINE PO      Take 200 mg by mouth daily        prenatal multivitamin plus iron 27-0.8 MG Tabs per tablet      Take 1 tablet by mouth daily

## 2018-01-22 NOTE — NURSING NOTE
Chief Complaint   Patient presents with     RECHECK     OB 11 weeks and 5 days, B/P check   Tegan Conklin LPN

## 2018-01-22 NOTE — LETTER
2018       RE: Rona Krishnamurthy  [address] [facility] [address]     Dear Colleague,    Thank you for referring your patient, Rona Krishnamurthy, to the [facility] at [facility]. Please see a copy of my visit note below.    Subjective:     37 year old  at 11w5d presents for a routine prenatal appointment.       No vaginal bleeding or leakage of fluid.  No contractions or cramping. No fetal movement yet. Discussed quickening.     No HA, visual changes, RUQ or epigastric pain.     The patient presents with the following concerns: None, feeling well. Just had a visit with Dr. Huerta. Will start ASA next week and continue to f/u with Dr. Huerta monthly. No other questions or concerns.     Level II US  Scheduled.     Declines all genetic testing.      Objective:  Vitals:    18 1039   BP: 135/89   Pulse: 93   Weight: 70.3 kg (155 lb)     See OB flowsheet    Assessment/Plan     Encounter Diagnosis   Name Primary?     High-risk pregnancy, elderly primigravida Yes     - Reviewed total weight gain, encouraged continued healthy diet and exercise.      - Reviewed why/how to contact provider.   -Patient education/orders or handouts today:  Fetal movement and Level 2 u/s scheduled  -Return to clinic in 4 weeks and prn if questions or concerns.     NITHIN Claudio CNM

## 2018-02-19 ENCOUNTER — OFFICE VISIT (OUTPATIENT)
Dept: INTERNAL MEDICINE | Facility: CLINIC | Age: 38
End: 2018-02-19
Attending: INTERNAL MEDICINE
Payer: COMMERCIAL

## 2018-02-19 ENCOUNTER — OFFICE VISIT (OUTPATIENT)
Dept: OBGYN | Facility: CLINIC | Age: 38
End: 2018-02-19
Attending: ADVANCED PRACTICE MIDWIFE
Payer: COMMERCIAL

## 2018-02-19 VITALS
HEART RATE: 99 BPM | HEIGHT: 62 IN | DIASTOLIC BLOOD PRESSURE: 84 MMHG | BODY MASS INDEX: 29.44 KG/M2 | SYSTOLIC BLOOD PRESSURE: 136 MMHG | WEIGHT: 160 LBS

## 2018-02-19 VITALS
WEIGHT: 160 LBS | HEART RATE: 99 BPM | HEIGHT: 62 IN | SYSTOLIC BLOOD PRESSURE: 136 MMHG | BODY MASS INDEX: 29.44 KG/M2 | DIASTOLIC BLOOD PRESSURE: 84 MMHG

## 2018-02-19 DIAGNOSIS — O09.519 HIGH-RISK PREGNANCY, ELDERLY PRIMIGRAVIDA: Primary | ICD-10-CM

## 2018-02-19 DIAGNOSIS — R03.0 ELEVATED BLOOD PRESSURE READING WITHOUT DIAGNOSIS OF HYPERTENSION: Primary | ICD-10-CM

## 2018-02-19 DIAGNOSIS — R03.0 ELEVATED BLOOD PRESSURE READING WITHOUT DIAGNOSIS OF HYPERTENSION: ICD-10-CM

## 2018-02-19 DIAGNOSIS — R03.0 WHITE COAT SYNDROME WITHOUT HYPERTENSION: ICD-10-CM

## 2018-02-19 PROCEDURE — G0463 HOSPITAL OUTPT CLINIC VISIT: HCPCS | Mod: 27

## 2018-02-19 PROCEDURE — G0463 HOSPITAL OUTPT CLINIC VISIT: HCPCS | Mod: ZF

## 2018-02-19 ASSESSMENT — PAIN SCALES - GENERAL: PAINLEVEL: NO PAIN (0)

## 2018-02-19 NOTE — MR AVS SNAPSHOT
After Visit Summary   2/19/2018    Rona Krishnamurthy    MRN: [id number]           Patient Information     Date Of Birth          1980        Visit Information        Provider Department      2/19/2018 9:00 AM Dahlia Villa MD [facility]         Today's Diagnoses     Elevated blood pressure reading without diagnosis of hypertension    -  1    White coat syndrome without hypertension           Follow-ups after your visit        Your next 10 appointments already scheduled     Mar 07, 2018 10:15 AM CST   (Arrive by 10:00 AM)   MEHDI US COMP with URMFMUSR1   [facility])    [address]  [facility] [id number] [phone]           Wear comfortable clothes and leave your valuables at home.            Mar 07, 2018 10:45 AM CST   Radiology MD with UR MEHDI JACKSON   [facility])    [address]  [facility] [address] [phone]           Please arrive at the time given for your first appointment. This visit is used internally to schedule the physician's time during your ultrasound.              Who to contact     Please call your clinic at [phone] to:    Ask questions about your health    Make or cancel appointments    Discuss your medicines    Learn about your test results    Speak to your doctor            Additional Information About Your Visit        MyChart Information     [employer] gives you secure access to your electronic health record. If you see a primary care provider, you can also send messages to your care team and make appointments. If you have questions, please call your primary care clinic.  If you do not have a primary care provider, please call [phone] and they will assist you.      [employer] is an electronic gateway that provides easy, online access to your  "medical records. With [employer], you can request a clinic appointment, read your test results, renew a prescription or communicate with your care team.     To access your existing account, please contact your [facility] or call [phone] for assistance.        Care EveryWhere ID     This is your Care EveryWhere ID. This could be used by other organizations to access your [place] medical records  [id number]        Your Vitals Were     Pulse Height Last Period BMI (Body Mass Index)          99 1.575 m (5' 2\") 10/24/2017 29.26 kg/m2         Blood Pressure from Last 3 Encounters:   02/19/18 136/84   02/19/18 136/84   01/22/18 135/89    Weight from Last 3 Encounters:   02/19/18 72.6 kg (160 lb)   02/19/18 72.6 kg (160 lb)   01/22/18 70.3 kg (155 lb)              Today, you had the following     No orders found for display       Primary Care Provider Office Phone # Fax #    Mamadou Canales [phone]       [address] [facility] [address]        Equal Access to Services     [facility]: Hadii aad ku hadasho Soomaali, waaxda luqadaha, qaybta kaalmada adeegyada, kim rousseau haypatrician simón romero . So [facility] [phone].    ATENCIÓN: Si habla español, tiene a trevizo disposición servicios gratuitos de asistencia lingüística. Llame al [phone].    We comply with applicable federal civil rights laws and [place] laws. We do not discriminate on the basis of race, color, national origin, age, disability, sex, sexual orientation, or gender identity.            Thank you!     Thank you for choosing [facility]   for your care. Our goal is always to provide you with excellent care. Hearing back from our patients is one way we can continue to improve our services. Please take a few minutes to complete the written survey that you may receive in the mail after your visit with us. Thank you!             Your Updated Medication List - Protect " others around you: Learn how to safely use, store and throw away your medicines at www.disposemymeds.org.          This list is accurate as of 2/19/18  9:41 AM.  Always use your most recent med list.                   Brand Name Dispense Instructions for use Diagnosis    DiphenhydrAMINE HCl (Sleep) 50 MG Caps      50 mg as needed        L-THEANINE PO      Take 200 mg by mouth daily        prenatal multivitamin plus iron 27-0.8 MG Tabs per tablet      Take 1 tablet by mouth daily

## 2018-02-19 NOTE — PROGRESS NOTES
"                     PRIMARY CARE CENTER       SUBJECTIVE:  Rona Krishnamurthy is a 37 year old female who comes in for follow-up on blood pressure.  She has a list of her blood pressure readings from home.  She has been checking it mostly every day.  Her blood pressures range largely in the 120s/80s to a few readings in the 130s/80s.  Even the readings that are little bit more elevated, when she repeats them a few minutes later, the levels have decreased.  She does have her blood pressure cuff with her in clinic today.  When I was in the room with her after her blood pressure has been checked by remaining staff, she checked her blood pressure in the 150s/90s.  She denies any headaches, changes in vision, chest pain, or edema.  She has started on a baby aspirin as of 12 weeks.    Past Medical History:   Diagnosis Date     Atopic dermatitis 11/16/2005     Chronic hypertension          Medications and allergies reviewed by me today.     ROS:   Constitutional, HEENT, cardiovascular, pulmonary, gi and gu systems are negative, except as otherwise noted.    OBJECTIVE:    /84  Pulse 99  Ht 1.575 m (5' 2\")  Wt 72.6 kg (160 lb)  LMP 10/24/2017  BMI 29.26 kg/m2   Wt Readings from Last 1 Encounters:   02/19/18 72.6 kg (160 lb)     General: Pleasant female, in no apparent distress  Respiratory: Lungs clear to auscultation bilaterally  Cardiovascular: Heart regular rate and rhythm, no murmurs rubs or gallops  Extremities: Warm well perfused, no lower extremity edema  Neuro: Alert and oriented ×3, no focal deficits     ASSESSMENT/PLAN:    Rona was seen today for recheck.  Her blood pressure readings at home appear to be accurate based off of her readings here.  She will continue to measure her blood pressure readings at home, and make a recording of these.  She will bring these with her to her follow-up OB appointments, as we cannot rely on our readings in clinic given her white coat hypertension.  If her blood " pressures begin to reach an average of 150/90, she should come back and see me.  She is agreeable with this plan.    Diagnoses and all orders for this visit:    Elevated blood pressure reading without diagnosis of hypertension    White coat syndrome without hypertension       Pt should return to clinic for f/u with me in PRN      Dahlia Noriega MD  02/19/18

## 2018-02-19 NOTE — LETTER
"2/19/2018       RE: Rona Krishnamurthy  [address] [facility] [address]     Dear Colleague,    Thank you for referring your patient, Rona Krishnamurthy, to the [facility] Please see a copy of my visit note below.                         PRIMARY CARE CENTER       SUBJECTIVE:  Rona Krishnamurthy is a 37 year old female who comes in for follow-up on blood pressure.  She has a list of her blood pressure readings from home.  She has been checking it mostly every day.  Her blood pressures range largely in the 120s/80s to a few readings in the 130s/80s.  Even the readings that are little bit more elevated, when she repeats them a few minutes later, the levels have decreased.  She does have her blood pressure cuff with her in clinic today.  When I was in the room with her after her blood pressure has been checked by remaining staff, she checked her blood pressure in the 150s/90s.  She denies any headaches, changes in vision, chest pain, or edema.  She has started on a baby aspirin as of 12 weeks.    Past Medical History:   Diagnosis Date     Atopic dermatitis 11/16/2005     Chronic hypertension          Medications and allergies reviewed by me today.     ROS:   Constitutional, HEENT, cardiovascular, pulmonary, gi and gu systems are negative, except as otherwise noted.    OBJECTIVE:    /84  Pulse 99  Ht 1.575 m (5' 2\")  Wt 72.6 kg (160 lb)  LMP 10/24/2017  BMI 29.26 kg/m2   Wt Readings from Last 1 Encounters:   02/19/18 72.6 kg (160 lb)     General: Pleasant female, in no apparent distress  Respiratory: Lungs clear to auscultation bilaterally  Cardiovascular: Heart regular rate and rhythm, no murmurs rubs or gallops  Extremities: Warm well perfused, no lower extremity edema  Neuro: Alert and oriented ×3, no focal deficits     ASSESSMENT/PLAN:    Rona was seen today for recheck.  Her blood pressure readings at home appear to " be accurate based off of her readings here.  She will continue to measure her blood pressure readings at home, and make a recording of these.  She will bring these with her to her follow-up OB appointments, as we cannot rely on our readings in clinic given her white coat hypertension.  If her blood pressures begin to reach an average of 150/90, she should come back and see me.  She is agreeable with this plan.    Diagnoses and all orders for this visit:    Elevated blood pressure reading without diagnosis of hypertension    White coat syndrome without hypertension       Pt should return to clinic for f/u with me in PRN        Again, thank you for allowing me to participate in the care of your patient.      Sincerely,    Dahlia Noriega MD

## 2018-02-19 NOTE — NURSING NOTE
Chief Complaint   Patient presents with     RECHECK     OB 15 weeks follow-up B/P check   Tegan Conklin LPN

## 2018-02-19 NOTE — MR AVS SNAPSHOT
After Visit Summary   2/19/2018    Rona Krishnamurthy    MRN: [id number]           Patient Information     Date Of Birth          1980        Visit Information        Provider Department      2/19/2018 9:30 AM Salud Alfaro CNM [facility]        Today's Diagnoses     High-risk pregnancy, elderly primigravida    -  1    Elevated blood pressure reading without diagnosis of hypertension           Follow-ups after your visit        Follow-up notes from your care team     Return in about 4 weeks (around 3/19/2018) for MARISOL MANZANARES.      Your next 10 appointments already scheduled     Mar 07, 2018 10:15 AM CST   (Arrive by 10:00 AM)   MEHDI US COMP with URMFMUSR1   [facility]aternal [facility]etal Medicine Ultrasound - [facility])    [address]  [facility] [address] [phone]           Wear comfortable clothes and leave your valuables at home.            Mar 07, 2018 10:45 AM CST   Radiology MD with UR MEHDI JACKSON   [facility] Maternal Fetal Medicine - [facility])    [address]  [facility] [address] [phone]           Please arrive at the time given for your first appointment. This visit is used internally to schedule the physician's time during your ultrasound.            Mar 19, 2018 10:00 AM CDT   RETURN OB with NITHIN Childress CNM   [facility] ([facility])    [facility] [address]  [address]  [facility] [address] [phone]              Who to contact     Please call your clinic at [phone] to:    Ask questions about your health    Make or cancel appointments    Discuss your medicines    Learn about your test results    Speak to your doctor            Additional Information About Your Visit        MyChart Information     [employer] gives you secure access to your  "electronic health record. If you see a primary care provider, you can also send messages to your care team and make appointments. If you have questions, please call your primary care clinic.  If you do not have a primary care provider, please call [phone] and they will assist you.      [employer] is an electronic gateway that provides easy, online access to your medical records. With [employer], you can request a clinic appointment, read your test results, renew a prescription or communicate with your care team.     To access your existing account, please contact your [facility] or call [phone] for assistance.        Care EveryWhere ID     This is your Care EveryWhere ID. This could be used by other organizations to access your [place] medical records  [id number]        Your Vitals Were     Pulse Height Last Period BMI (Body Mass Index)          99 1.575 m (5' 2\") 10/24/2017 29.26 kg/m2         Blood Pressure from Last 3 Encounters:   02/19/18 136/84   02/19/18 136/84   01/22/18 135/89    Weight from Last 3 Encounters:   02/19/18 72.6 kg (160 lb)   02/19/18 72.6 kg (160 lb)   01/22/18 70.3 kg (155 lb)              Today, you had the following     No orders found for display       Primary Care Provider Office Phone # Fax #    Mamadou Canales [phone]       [address] [facility] [address]        Equal Access to Services     [facility] : Hadii aad ku hadasho Soomaali, waaxda luqadaha, qaybta kaalmada adeegyada, kim romero . So [facility] [phone].    ATENCIÓN: Si habla español, tiene a trevizo disposición servicios gratuitos de asistencia lingüística. Llame al [phone].    We comply with applicable federal civil rights laws and [place] laws. We do not discriminate on the basis of race, color, national origin, age, disability, sex, sexual orientation, or gender identity.            Thank you!     Thank you for choosing " [facility]  for your care. Our goal is always to provide you with excellent care. Hearing back from our patients is one way we can continue to improve our services. Please take a few minutes to complete the written survey that you may receive in the mail after your visit with us. Thank you!             Your Updated Medication List - Protect others around you: Learn how to safely use, store and throw away your medicines at www.disposemymeds.org.          This list is accurate as of 2/19/18 11:59 PM.  Always use your most recent med list.                   Brand Name Dispense Instructions for use Diagnosis    DiphenhydrAMINE HCl (Sleep) 50 MG Caps      50 mg as needed        L-THEANINE PO      Take 200 mg by mouth daily        prenatal multivitamin plus iron 27-0.8 MG Tabs per tablet      Take 1 tablet by mouth daily

## 2018-02-19 NOTE — LETTER
"2018       RE: Rona Krishnamurthy  [address] [facility] [address]     Dear Colleague,    Thank you for referring your patient, Rona Krishnamurthy, to the [facility] at [facility]. Please see a copy of my visit note below.    Subjective:      37 year old  at 15w5d presents for a routine prenatal appointment.      Denies cramping/contractions, vaginal bleeding or leakage of fluid.  Not feeling fetal movement yet.        No HA, visual changes, RUQ or epigastric pain.     Patient concerns: Feeling well. Has been taking her blood pressures at home per Dr. Huerta's recommendations. Viewed BP log- highest 138/88 and 124/90. Typically 120s/80s. Had appointment with Dr. Huerta today who recommended continued home BP monitoring, continued daily aspirin and follow up with her prn unless changes in home monitoring. Pt agreeable with IM plan of care as well as OB MD plan of care (treat as chtn for delivery timing).     Has been getting acupuncture several times per week- helps her relax.     Has level 2 ultrasound on 3/7- will be finding out boy or girl.    Traveling to [place] for vacation on Saturday. Has reviewed cdc website for [place]- travel is no longer restricted for pregnant women.      Objective:  Vitals:    18 0934   BP: 136/84   Pulse: 99   Weight: 72.6 kg (160 lb)   Height: 1.575 m (5' 2\")       See OB flowsheet    Assessment/Plan     Encounter Diagnoses   Name Primary?     High-risk pregnancy, elderly primigravida Yes     Elevated blood pressure reading without diagnosis of hypertension      - Reviewed total weight gain, encouraged continued healthy diet and exercise.      - Reviewed why/how to contact provider.    Patient education/orders or handouts today:  PTL signs/symptoms, Quad screen, Fetal movement and Level 2 u/s scheduled    Reviewed plan of care from Dr. Huerta. Continue home BP checks. If remain normotensive, rtc " prn.  Per OB MD, continue to treat as CHTN. Continue daily aspirin.   Level 2 u/s scheduled for 3/7/18.   Declined quad screen.  Reviewed travel precautions. Has reviewed CDC site and recommendations.  Continue scheduled prenatal care, RTC in 4 weeks and prn if questions or concerns.     NITHIN Welsh, CNM

## 2018-02-26 NOTE — PROGRESS NOTES
"Subjective:      37 year old  at 15w5d presents for a routine prenatal appointment.      Denies cramping/contractions, vaginal bleeding or leakage of fluid.  Not feeling fetal movement yet.        No HA, visual changes, RUQ or epigastric pain.     Patient concerns: Feeling well. Has been taking her blood pressures at home per Dr. Huerta's recommendations. Viewed BP log- highest 138/88 and 124/90. Typically 120s/80s. Had appointment with Dr. Huerta today who recommended continued home BP monitoring, continued daily aspirin and follow up with her prn unless changes in home monitoring. Pt agreeable with IM plan of care as well as OB MD plan of care (treat as chtn for delivery timing).     Has been getting acupuncture several times per week- helps her relax.     Has level 2 ultrasound on 3/7- will be finding out boy or girl.    Traveling to [place] for vacation on Saturday. Has reviewed [employer] website for [place]- travel is no longer restricted for pregnant women.      Objective:  Vitals:    18 0934   BP: 136/84   Pulse: 99   Weight: 72.6 kg (160 lb)   Height: 1.575 m (5' 2\")       See OB flowsheet    Assessment/Plan     Encounter Diagnoses   Name Primary?     High-risk pregnancy, elderly primigravida Yes     Elevated blood pressure reading without diagnosis of hypertension      - Reviewed total weight gain, encouraged continued healthy diet and exercise.      - Reviewed why/how to contact provider.    Patient education/orders or handouts today:  PTL signs/symptoms, Quad screen, Fetal movement and Level 2 u/s scheduled    Reviewed plan of care from Dr. Huerta. Continue home BP checks. If remain normotensive, rtc prn.  Per OB MD, continue to treat as CHTN. Continue daily aspirin.   Level 2 u/s scheduled for 3/7/18.   Declined quad screen.  Reviewed travel precautions. Has reviewed CDC site and recommendations.  Continue scheduled prenatal care, RTC in 4 weeks and prn if questions or concerns.     Saldu Alfaro" APRN, CNM

## 2018-02-27 PROBLEM — Z00.6 RESEARCH STUDY PATIENT: Status: ACTIVE | Noted: 2018-02-27

## 2018-03-05 ENCOUNTER — PRE VISIT (OUTPATIENT)
Dept: MATERNAL FETAL MEDICINE | Facility: CLINIC | Age: 38
End: 2018-03-05

## 2018-03-07 ENCOUNTER — HOSPITAL ENCOUNTER (OUTPATIENT)
Dept: ULTRASOUND IMAGING | Facility: CLINIC | Age: 38
Discharge: HOME | End: 2018-03-07
Attending: ADVANCED PRACTICE MIDWIFE | Admitting: OBSTETRICS & GYNECOLOGY
Payer: COMMERCIAL

## 2018-03-07 ENCOUNTER — OFFICE VISIT (OUTPATIENT)
Dept: MATERNAL FETAL MEDICINE | Facility: CLINIC | Age: 38
End: 2018-03-07
Attending: ADVANCED PRACTICE MIDWIFE
Payer: COMMERCIAL

## 2018-03-07 DIAGNOSIS — O35.9XX0 SUSPECTED FETAL ANOMALY, ANTEPARTUM, SINGLE OR UNSPECIFIED FETUS: ICD-10-CM

## 2018-03-07 DIAGNOSIS — O26.90 PREGNANCY RELATED CONDITION, ANTEPARTUM: ICD-10-CM

## 2018-03-07 DIAGNOSIS — O09.512 ELDERLY PRIMIGRAVIDA IN SECOND TRIMESTER: Primary | ICD-10-CM

## 2018-03-07 PROCEDURE — 76811 OB US DETAILED SNGL FETUS: CPT

## 2018-03-07 NOTE — MR AVS SNAPSHOT
After Visit Summary   3/7/2018    Rona Krishnamurthy    MRN: [id number]           Patient Information     Date Of Birth          1980        Visit Information        Provider Department      3/7/2018 10:45 AM Alexandra Reed DO [facility] Maternal Fetal Medicine [facility]        Today's Diagnoses     Elderly primigravida in second trimester    -  1    Suspected fetal anomaly, antepartum, single or unspecified fetus           Follow-ups after your visit        Your next 10 appointments already scheduled     Mar 19, 2018 10:00 AM CDT   RETURN OB with NITHIN Childress CNM   [facility] ([facility])    [place] [facility] [address]  [address]  [address]  [facility] [id number] [phone]            Mar 29, 2018  1:30 PM CDT   (Arrive by 1:15 PM)   MFM US COMPRE SINGLE F/U with [id number]   [facility]etal [facility] Ultrasound - [place] ([facility])    [address]  [facility] [address] [phone]           Wear comfortable clothes and leave your valuables at home.            Mar 29, 2018  2:00 PM CDT   Radiology MD with UR MFM MD   [facility] [place] ([facility])    [address]  [facility] [address] [phone]           Please arrive at the time given for your first appointment. This visit is used internally to schedule the physician's time during your ultrasound.              Future tests that were ordered for you today     Open Future Orders        Priority Expected Expires Ordered    MFM US Comprehensive Single F/U Routine 3/28/2018 3/7/2019 3/7/2018            Who to contact     If you have questions or need follow up information about today's clinic visit or your schedule please contact [facility] MATERNAL FETAL MEDICINE [facility] directly at [phone].  Normal or non-critical lab and imaging  results will be communicated to you by MyChart, letter or phone within 4 business days after the clinic has received the results. If you do not hear from us within 7 days, please contact the clinic through [employer] or phone. If you have a critical or abnormal lab result, we will notify you by phone as soon as possible.  Submit refill requests through [employer] or call your pharmacy and they will forward the refill request to us. Please allow 3 business days for your refill to be completed.          Additional Information About Your Visit        [employer] Information     [employer] gives you secure access to your electronic health record. If you see a primary care provider, you can also send messages to your care team and make appointments. If you have questions, please call your primary care clinic.  If you do not have a primary care provider, please call [phone] and they will assist you.        Care EveryWhere ID     This is your Care EveryWhere ID. This could be used by other organizations to access your [place] medical records  [id number]        Your Vitals Were     Last Period                   10/24/2017            Blood Pressure from Last 3 Encounters:   02/19/18 136/84   02/19/18 136/84   01/22/18 135/89    Weight from Last 3 Encounters:   02/19/18 72.6 kg (160 lb)   02/19/18 72.6 kg (160 lb)   01/22/18 70.3 kg (155 lb)               Primary Care Provider Office Phone # Fax #    Mamadou Canales [phone]       [address]  [facility] [address]        Equal Access to Services     [facility] AH: Hadii aad ku hadasho Soomaali, waaxda luqadaha, qaybta kaalmada adeegyada, waxay idiin haypatrician simón waite la'jaison . So [facility] [phone].    ATENCIÓN: Si habla español, tiene a trevizo disposición servicios gratuitos de asistencia lingüística. Llame al [phone].    We comply with applicable federal civil rights laws and [place] laws. We do not discriminate on the basis of race, color,  national origin, age, disability, sex, sexual orientation, or gender identity.            Thank you!     Thank you for choosing [facility]  for your care. Our goal is always to provide you with excellent care. Hearing back from our patients is one way we can continue to improve our services. Please take a few minutes to complete the written survey that you may receive in the mail after your visit with us. Thank you!             Your Updated Medication List - Protect others around you: Learn how to safely use, store and throw away your medicines at www.disposemymeds.org.          This list is accurate as of 3/7/18 10:09 PM.  Always use your most recent med list.                   Brand Name Dispense Instructions for use Diagnosis    DiphenhydrAMINE HCl (Sleep) 50 MG Caps      50 mg as needed        L-THEANINE PO      Take 200 mg by mouth daily        prenatal multivitamin plus iron 27-0.8 MG Tabs per tablet      Take 1 tablet by mouth daily

## 2018-03-08 NOTE — PROGRESS NOTES
"Please see \"Imaging\" tab under \"Chart Review\" for details of today's US.    Alexandra Reed, DO    "

## 2018-03-19 ENCOUNTER — OFFICE VISIT (OUTPATIENT)
Dept: OBGYN | Facility: CLINIC | Age: 38
End: 2018-03-19
Attending: ADVANCED PRACTICE MIDWIFE
Payer: COMMERCIAL

## 2018-03-19 VITALS — BODY MASS INDEX: 30.27 KG/M2 | DIASTOLIC BLOOD PRESSURE: 89 MMHG | WEIGHT: 165.5 LBS | SYSTOLIC BLOOD PRESSURE: 157 MMHG

## 2018-03-19 DIAGNOSIS — O09.519 HIGH-RISK PREGNANCY, ELDERLY PRIMIGRAVIDA: Primary | ICD-10-CM

## 2018-03-19 DIAGNOSIS — O10.919 CHRONIC HYPERTENSION AFFECTING PREGNANCY: ICD-10-CM

## 2018-03-19 PROCEDURE — G0463 HOSPITAL OUTPT CLINIC VISIT: HCPCS | Mod: ZF

## 2018-03-19 NOTE — LETTER
3/19/2018       RE: Rona Krishnamurthy  [address] [facility] [address]     Dear Colleague,    Thank you for referring your patient, Rona Krishnamurthy, to the [facility] at [facility]. Please see a copy of my visit note below.    Subjective:     37 year old  at 19w5d presents for a routine prenatal appointment.      No vaginal bleeding or leakage of fluid. No contractions or cramping.   Positive fetal movement.       The patient presents with the following concerns: Feels anxious today. Reviewed BPs from home monitoring. Mostly 130s/80s.  No HA, visual changes, RUQ or epigastric pain.  Level II US reviewed.  Has follow up scheduled.     Objective:  There were no vitals filed for this visit.  See OB flowsheet    Assessment/Plan     Encounter Diagnoses   Name Primary?     High-risk pregnancy, elderly primigravida Yes     Chronic hypertension affecting pregnancy      Patient education/orders or handouts today:  - PTL signs/symptoms  - Reviewed recommendation of beginning Weekly BPP and Growth US q3 weeks beginning at 32 weeks.   - Recommend Tiffanie follow up with Dr. Huerta again   - Continue home monitoring and baby ASA    Return to clinic in 4 weeks and prn if questions or concerns.     Again, thank you for allowing me to participate in the care of your patient.      Sincerely,    NITHIN Childress CNM

## 2018-03-19 NOTE — MR AVS SNAPSHOT
After Visit Summary   3/19/2018    Rona Krisnhamurthy    MRN: [id number]           Patient Information     Date Of Birth          1980        Visit Information        Provider Department      3/19/2018 10:00 AM Nathalia Solorzano APRN [facility]        Today's Diagnoses     High-risk pregnancy, elderly primigravida    -  1    Chronic hypertension affecting pregnancy           Follow-ups after your visit        Follow-up notes from your care team     Return in about 4 weeks (around 4/16/2018).      Your next 10 appointments already scheduled     Mar 29, 2018  1:30 PM CDT   (Arrive by 1:15 PM)   M US COMPRE SINGLE F/U with [id number]   [facility] - [facility])    [address]  [facility] [id number] [phone]           Wear comfortable clothes and leave your valuables at home.            Mar 29, 2018  2:00 PM CDT   Radiology MD with UR MAMI JACKSON   [facility] - [facility])    [address]  [facility] [address] [phone]           Please arrive at the time given for your first appointment. This visit is used internally to schedule the physician's time during your ultrasound.            Apr 06, 2018 12:45 PM CDT   Return Visit with Dahlia Noriega MD   [facility]  ([facility])    [facility]  [address]  [address]  [address] [id number] [phone]            Apr 16, 2018 10:00 AM CDT   RETURN OB with Salud Alfaro CNM   [facility] ([facility])    [facility]  [address]  [address]  [facility] [address] [phone]              Who to contact     Please call your clinic at [phone] to:    Ask questions about your  health    Make or cancel appointments    Discuss your medicines    Learn about your test results    Speak to your doctor            Additional Information About Your Visit        [employer] Information     [employer] gives you secure access to your electronic health record. If you see a primary care provider, you can also send messages to your care team and make appointments. If you have questions, please call your primary care clinic.  If you do not have a primary care provider, please call [phone] and they will assist you.      [employer] is an electronic gateway that provides easy, online access to your medical records. With [employer], you can request a clinic appointment, read your test results, renew a prescription or communicate with your care team.     To access your existing account, please contact your [facility] or call [phone] for assistance.        Care EveryWhere ID     This is your Care EveryWhere ID. This could be used by other organizations to access your [place] medical records  [id number]        Your Vitals Were     Last Period BMI (Body Mass Index)                10/24/2017 30.27 kg/m2           Blood Pressure from Last 3 Encounters:   03/19/18 157/89   02/19/18 136/84   02/19/18 136/84    Weight from Last 3 Encounters:   03/19/18 75.1 kg (165 lb 8 oz)   02/19/18 72.6 kg (160 lb)   02/19/18 72.6 kg (160 lb)              Today, you had the following     No orders found for display       Primary Care Provider Office Phone # Fax #    Mamadou Canales [phone]       [address]  [facility] [address]        Equal Access to Services     [facility]: Hadii aad brayden hadasho Soomaali, waaxda luqadaha, qaybta kaalmada adeegyada, kim romero . So [facility] [phone].    ATENCIÓN: Si habla español, tiene a trevizo disposición servicios gratuitos de asistencia lingüística. Llame al [phone].    We comply with applicable  federal civil rights laws and [place] laws. We do not discriminate on the basis of race, color, national origin, age, disability, sex, sexual orientation, or gender identity.            Thank you!     Thank you for choosing [facility]  for your care. Our goal is always to provide you with excellent care. Hearing back from our patients is one way we can continue to improve our services. Please take a few minutes to complete the written survey that you may receive in the mail after your visit with us. Thank you!             Your Updated Medication List - Protect others around you: Learn how to safely use, store and throw away your medicines at www.disposemymeds.org.          This list is accurate as of 3/19/18 11:14 AM.  Always use your most recent med list.                   Brand Name Dispense Instructions for use Diagnosis    DiphenhydrAMINE HCl (Sleep) 50 MG Caps      50 mg as needed        L-THEANINE PO      Take 200 mg by mouth daily        prenatal multivitamin plus iron 27-0.8 MG Tabs per tablet      Take 1 tablet by mouth daily

## 2018-03-19 NOTE — PROGRESS NOTES
Subjective:     37 year old  at 19w5d presents for a routine prenatal appointment.      No vaginal bleeding or leakage of fluid. No contractions or cramping.   Positive fetal movement.       The patient presents with the following concerns: Feels anxious today. Reviewed BPs from home monitoring. Mostly 130s/80s.  No HA, visual changes, RUQ or epigastric pain.  Level II US reviewed.  Has follow up scheduled.     Objective:  There were no vitals filed for this visit.  See OB flowsheet    Assessment/Plan     Encounter Diagnoses   Name Primary?     High-risk pregnancy, elderly primigravida Yes     Chronic hypertension affecting pregnancy      Patient education/orders or handouts today:  - PTL signs/symptoms  - Reviewed recommendation of beginning Weekly BPP and Growth US q3 weeks beginning at 32 weeks.   - Recommend Tiffanie follow up with Dr. Huerta again   - Continue home monitoring and baby ASA    Return to clinic in 4 weeks and prn if questions or concerns.   NITHIN Childress CNM

## 2018-03-29 ENCOUNTER — HOSPITAL ENCOUNTER (OUTPATIENT)
Dept: ULTRASOUND IMAGING | Facility: CLINIC | Age: 38
Discharge: HOME | End: 2018-03-29
Attending: OBSTETRICS & GYNECOLOGY | Admitting: OBSTETRICS & GYNECOLOGY
Payer: COMMERCIAL

## 2018-03-29 ENCOUNTER — OFFICE VISIT (OUTPATIENT)
Dept: MATERNAL FETAL MEDICINE | Facility: CLINIC | Age: 38
End: 2018-03-29
Attending: OBSTETRICS & GYNECOLOGY
Payer: COMMERCIAL

## 2018-03-29 DIAGNOSIS — O35.9XX0 SUSPECTED FETAL ANOMALY, ANTEPARTUM, SINGLE OR UNSPECIFIED FETUS: ICD-10-CM

## 2018-03-29 DIAGNOSIS — O09.522 ELDERLY MULTIGRAVIDA IN SECOND TRIMESTER: Primary | ICD-10-CM

## 2018-03-29 PROCEDURE — 76816 OB US FOLLOW-UP PER FETUS: CPT

## 2018-03-29 NOTE — MR AVS SNAPSHOT
After Visit Summary   3/29/2018    Rona Krishnamurthy    MRN: [id number]           Patient Information     Date Of Birth          1980        Visit Information        Provider Department      3/29/2018 2:00 PM Alis Mccracken MD [facility] Maternal Fetal Medicine [facility]        Today's Diagnoses     Elderly multigravida in second trimester    -  1       Follow-ups after your visit        Your next 10 appointments already scheduled     Apr 06, 2018 12:45 PM CDT   Return Visit with Dahlia Noriega MD   [facility]  ([facility])    [place] [facility]  [address]  [address]  [address]  [facility] [id number] [phone]            Apr 16, 2018 10:00 AM CDT   RETURN OB with Salud Alfaro CNM   [facility] ([facility])    [place] [facility] [address]  [address]  [address]  [facility] [address] [phone]              Who to contact     If you have questions or need follow up information about today's clinic visit or your schedule please contact [facility] MATERNAL FETAL MEDICINE [facility] directly at [phone].  Normal or non-critical lab and imaging results will be communicated to you by MyChart, letter or phone within 4 business days after the clinic has received the results. If you do not hear from us within 7 days, please contact the clinic through [employer] or phone. If you have a critical or abnormal lab result, we will notify you by phone as soon as possible.  Submit refill requests through [employer] or call your pharmacy and they will forward the refill request to us. Please allow 3 business days for your refill to be completed.          Additional Information About Your Visit        MyChart Information     [employer] gives you secure access to your electronic health record. If you see a primary care provider, you can also send messages to your care team and  make appointments. If you have questions, please call your primary care clinic.  If you do not have a primary care provider, please call [phone] and they will assist you.        Care EveryWhere ID     This is your Care EveryWhere ID. This could be used by other organizations to access your [place] medical records  [id number]        Your Vitals Were     Last Period                   10/24/2017            Blood Pressure from Last 3 Encounters:   03/19/18 157/89   02/19/18 136/84   02/19/18 136/84    Weight from Last 3 Encounters:   03/19/18 75.1 kg (165 lb 8 oz)   02/19/18 72.6 kg (160 lb)   02/19/18 72.6 kg (160 lb)              Today, you had the following     No orders found for display       Primary Care Provider Office Phone # Fax #    Dahlia Paula Bradley Noriega [phone]       [address] [facility] [address]  [facility] [address]        Equal Access to Services     [facility] : Hadii aad ku hadasho Soomaali, waaxda luqadaha, qaybta kaalmada adeegyada, waxay idiin haypatrician simón romero . So [facility] [phone].    ATENCIÓN: Si habla español, tiene a trevizo disposición servicios gratuitos de asistencia lingüística. Llame al [phone].    We comply with applicable federal civil rights laws and [place] laws. We do not discriminate on the basis of race, color, national origin, age, disability, sex, sexual orientation, or gender identity.            Thank you!     Thank you for choosing [facility]  for your care. Our goal is always to provide you with excellent care. Hearing back from our patients is one way we can continue to improve our services. Please take a few minutes to complete the written survey that you may receive in the mail after your visit with us. Thank you!             Your Updated Medication List - Protect others around you: Learn how to safely use, store and throw away your medicines at www.disposemymeds.org.          This list is  accurate as of 3/29/18  2:44 PM.  Always use your most recent med list.                   Brand Name Dispense Instructions for use Diagnosis    DiphenhydrAMINE HCl (Sleep) 50 MG Caps      50 mg as needed        L-THEANINE PO      Take 200 mg by mouth daily        prenatal multivitamin plus iron 27-0.8 MG Tabs per tablet      Take 1 tablet by mouth daily

## 2018-03-29 NOTE — PROGRESS NOTES
Please see full imaging report from [employer] program under imaging tab.        Alis Mccracken MD  Maternal Fetal Medicine

## 2018-04-06 ENCOUNTER — OFFICE VISIT (OUTPATIENT)
Dept: INTERNAL MEDICINE | Facility: CLINIC | Age: 38
End: 2018-04-06
Attending: INTERNAL MEDICINE
Payer: COMMERCIAL

## 2018-04-06 VITALS
BODY MASS INDEX: 31.09 KG/M2 | DIASTOLIC BLOOD PRESSURE: 84 MMHG | WEIGHT: 170 LBS | SYSTOLIC BLOOD PRESSURE: 125 MMHG | HEART RATE: 98 BPM

## 2018-04-06 DIAGNOSIS — R03.0 WHITE COAT SYNDROME WITHOUT HYPERTENSION: Primary | ICD-10-CM

## 2018-04-06 PROCEDURE — G0463 HOSPITAL OUTPT CLINIC VISIT: HCPCS | Mod: ZF

## 2018-04-06 ASSESSMENT — PAIN SCALES - GENERAL: PAINLEVEL: NO PAIN (0)

## 2018-04-06 NOTE — LETTER
4/6/2018       RE: Rona Krishnamurthy  [address] [facility] [address]     Dear Colleague,    Thank you for referring your patient, Rona Krishnamurthy, to the [facility]  at [facility]. Please see a copy of my visit note below.                               SUBJECTIVE:  Rona Krishnamurthy is a 37 year old female who comes in for follow-up on her blood pressure.  It has continued to be elevated at her OB appointments.  However she has a list of her blood pressures today dating back to 29 January that she is taking at home, and her blood pressures are almost all within normal limits.  There is also not any evidence of the trend of then increasing.  She denies any headaches, changes in vision, chest pain, swelling in her legs.    Past Medical History:   Diagnosis Date     Atopic dermatitis 11/16/2005       Medications and allergies reviewed by me today.     ROS:   Constitutional, HEENT, cardiovascular, pulmonary, gi and gu systems are negative, except as otherwise noted.    OBJECTIVE:    /84  Pulse 98  Wt 77.1 kg (170 lb)  LMP 10/24/2017  Breastfeeding? No  BMI 31.09 kg/m2   Wt Readings from Last 1 Encounters:   04/06/18 77.1 kg (170 lb)                 ASSESSMENT/PLAN:    Rona was seen today for recheck.    Diagnoses and all orders for this visit:    White coat syndrome without hypertension  I continue to suspect that this is white coat hypertension.  She does not have any blood pressures at home that are greater than 140/90, nor greater than 150/100, at which point we would start treatment.  Also the blood pressures that she has checked her at various points in time throughout the day.  She does have an elevated reading at home, it quickly goes down with repeated blood pressure checks.  I would be very hesitant to start her on any blood pressure medicine at this point in time and I am concerned that treatment prematurely could result  in an infant small for gestational age.       I agree that she will need to be monitored closely for gestational hypertension, but we need to follow her home blood pressure readings as opposed to her in-office blood pressure readings.  Furthermore she is beginning to become even more anxious about her blood pressure readings in the office which is contributing again to even more elevation.  A final option would be to try checking her blood pressure at the end of the visit in addition to at the beginning to see if it resolves while she is in the office.    I am happy to review her blood pressures as needed to assess whether or not treatment is indicated.    I spent a total of 15 min with this patient, including >50% of the time spent in patient education and counseling.       Pt should return to clinic for f/u with me in PRN     Dahlia Noriega MD  04/06/18      Again, thank you for allowing me to participate in the care of your patient.      Sincerely,    Dahlia Noriega MD

## 2018-04-06 NOTE — NURSING NOTE
Chief Complaint   Patient presents with     RECHECK     OB 22 weeks  B/P check   Tegan Conklin LPN

## 2018-04-06 NOTE — LETTER
Date:April 9, 2018      Patient was self referred, no letter generated. Do not send.        [facility] Health Information

## 2018-04-06 NOTE — MR AVS SNAPSHOT
After Visit Summary   4/6/2018    Rona Krishnamurthy    MRN: [id number]           Patient Information     Date Of Birth          1980        Visit Information        Provider Department      4/6/2018 12:45 PM Dahlia Villa MD [facility]         Today's Diagnoses     White coat syndrome without hypertension    -  1       Follow-ups after your visit        Your next 10 appointments already scheduled     Apr 16, 2018 10:00 AM CDT   RETURN OB with Salud Alfaro CNM   [facility] ([facility])    [facility]  [address]  [address]  [facility] [id number] [phone]              Who to contact     Please call your clinic at [phone] to:    Ask questions about your health    Make or cancel appointments    Discuss your medicines    Learn about your test results    Speak to your doctor            Additional Information About Your Visit        MyChart Information     [employer] gives you secure access to your electronic health record. If you see a primary care provider, you can also send messages to your care team and make appointments. If you have questions, please call your primary care clinic.  If you do not have a primary care provider, please call [phone] and they will assist you.      Exaptive is an electronic gateway that provides easy, online access to your medical records. With Exaptive, you can request a clinic appointment, read your test results, renew a prescription or communicate with your care team.     To access your existing account, please contact your [facility] or call [phone] for assistance.        Care EveryWhere ID     This is your Care EveryWhere ID. This could be used by other organizations to access your [place] medical records  [id number]        Your Vitals Were     Pulse Last Period Breastfeeding? BMI (Body Mass Index)           98 10/24/2017 No 31.09 kg/m2         Blood Pressure from Last 3 Encounters:   04/06/18 125/84   03/19/18 157/89   02/19/18 136/84    Weight from Last 3 Encounters:   04/06/18 77.1 kg (170 lb)   03/19/18 75.1 kg (165 lb 8 oz)   02/19/18 72.6 kg (160 lb)              Today, you had the following     No orders found for display       Primary Care Provider Office Phone # Fax #    Dahlia Paula Noriega [phone]       [address] [place] [address] 741  [facility] [address]        Equal Access to Services     JEAN PAUL ACUNA : Hadii mak Prabhakar, wabárbara reyes, guadalupe saraviamaeva forte, kim bryan. So [facility] [phone].    ATENCIÓN: Si habla español, tiene a trevizo disposición servicios gratuitos de asistencia lingüística. Llame al [phone].    We comply with applicable federal civil rights laws and [place] laws. We do not discriminate on the basis of race, color, national origin, age, disability, sex, sexual orientation, or gender identity.            Thank you!     Thank you for choosing [facility]   for your care. Our goal is always to provide you with excellent care. Hearing back from our patients is one way we can continue to improve our services. Please take a few minutes to complete the written survey that you may receive in the mail after your visit with us. Thank you!             Your Updated Medication List - Protect others around you: Learn how to safely use, store and throw away your medicines at www.disposemymeds.org.          This list is accurate as of 4/6/18  2:36 PM.  Always use your most recent med list.                   Brand Name Dispense Instructions for use Diagnosis    DiphenhydrAMINE HCl (Sleep) 50 MG Caps      50 mg as needed        L-THEANINE PO      Take 200 mg by mouth daily        prenatal multivitamin plus iron 27-0.8 MG Tabs per tablet      Take 1 tablet by mouth daily

## 2018-04-06 NOTE — PROGRESS NOTES
SUBJECTIVE:  Rona Krishnamurthy is a 37 year old female who comes in for follow-up on her blood pressure.  It has continued to be elevated at her OB appointments.  However she has a list of her blood pressures today dating back to 29 January that she is taking at home, and her blood pressures are almost all within normal limits.  There is also not any evidence of the trend of then increasing.  She denies any headaches, changes in vision, chest pain, swelling in her legs.    Past Medical History:   Diagnosis Date     Atopic dermatitis 11/16/2005       Medications and allergies reviewed by me today.     ROS:   Constitutional, HEENT, cardiovascular, pulmonary, gi and gu systems are negative, except as otherwise noted.    OBJECTIVE:    /84  Pulse 98  Wt 77.1 kg (170 lb)  LMP 10/24/2017  Breastfeeding? No  BMI 31.09 kg/m2   Wt Readings from Last 1 Encounters:   04/06/18 77.1 kg (170 lb)                 ASSESSMENT/PLAN:    Rona was seen today for recheck.    Diagnoses and all orders for this visit:    White coat syndrome without hypertension  I continue to suspect that this is white coat hypertension.  She does not have any blood pressures at home that are greater than 140/90, nor greater than 150/100, at which point we would start treatment.  Also the blood pressures that she has checked her at various points in time throughout the day.  She does have an elevated reading at home, it quickly goes down with repeated blood pressure checks.  I would be very hesitant to start her on any blood pressure medicine at this point in time and I am concerned that treatment prematurely could result in an infant small for gestational age.       I agree that she will need to be monitored closely for gestational hypertension, but we need to follow her home blood pressure readings as opposed to her in-office blood pressure readings.  Furthermore she is beginning to become even more anxious about  her blood pressure readings in the office which is contributing again to even more elevation.  A final option would be to try checking her blood pressure at the end of the visit in addition to at the beginning to see if it resolves while she is in the office.    I am happy to review her blood pressures as needed to assess whether or not treatment is indicated.    I spent a total of 15 min with this patient, including >50% of the time spent in patient education and counseling.       Pt should return to clinic for f/u with me in PRN     Dahlia Noriega MD  04/06/18

## 2018-04-16 ENCOUNTER — OFFICE VISIT (OUTPATIENT)
Dept: OBGYN | Facility: CLINIC | Age: 38
End: 2018-04-16
Attending: ADVANCED PRACTICE MIDWIFE
Payer: COMMERCIAL

## 2018-04-16 VITALS
DIASTOLIC BLOOD PRESSURE: 86 MMHG | SYSTOLIC BLOOD PRESSURE: 153 MMHG | WEIGHT: 172.7 LBS | HEART RATE: 92 BPM | HEIGHT: 62 IN | BODY MASS INDEX: 31.78 KG/M2

## 2018-04-16 DIAGNOSIS — R03.0 WHITE COAT SYNDROME WITHOUT HYPERTENSION: ICD-10-CM

## 2018-04-16 DIAGNOSIS — O09.519 HIGH-RISK PREGNANCY, ELDERLY PRIMIGRAVIDA: Primary | ICD-10-CM

## 2018-04-16 DIAGNOSIS — R03.0 ELEVATED BLOOD PRESSURE READING WITHOUT DIAGNOSIS OF HYPERTENSION: ICD-10-CM

## 2018-04-16 PROCEDURE — G0463 HOSPITAL OUTPT CLINIC VISIT: HCPCS | Mod: ZF

## 2018-04-16 ASSESSMENT — PAIN SCALES - GENERAL: PAINLEVEL: NO PAIN (0)

## 2018-04-16 NOTE — MR AVS SNAPSHOT
After Visit Summary   4/16/2018    Rona Krishnamurthy    MRN: [id number]           Patient Information     Date Of Birth          1980        Visit Information        Provider Department      4/16/2018 10:00 AM Salud Alfaro CNM [facility]        Today's Diagnoses     High-risk pregnancy, elderly primigravida    -  1    White coat syndrome without hypertension        Elevated blood pressure reading without diagnosis of hypertension- per OB- treat as CHTN for pregnancy           Follow-ups after your visit        Follow-up notes from your care team     Return in about 3 years (around 4/16/2021) for Growth u/s & EOB- CNM.      Your next 10 appointments already scheduled     May 18, 2018  9:30 AM CDT   [facility]   [facility] ([facility])    [facility] [address]  [address]  [address]  [facility] [address] [phone]            May 18, 2018 10:00 AM CDT   Return Obstetrics Extended with NITHIN Maldonado CNM   [facility] ([facility])    [facility] [address]  [address]  [address]  [facility] [address] [phone]              Future tests that were ordered for you today     Open Standing Orders        Priority Remaining Interval Expires Ordered    Growth Ultrasound [address]170 Routine 4/4 q4week 8/14/2018 4/16/2018            Who to contact     Please call your clinic at [phone] to:    Ask questions about your health    Make or cancel appointments    Discuss your medicines    Learn about your test results    Speak to your doctor            Additional Information About Your Visit        MyChart Information     [employer] gives you secure access to your electronic health record. If you see a primary care provider, you can also send messages to your care team and make appointments. If you have questions, please  "call your primary care clinic.  If you do not have a primary care provider, please call [phone] and they will assist you.      [employer] is an electronic gateway that provides easy, online access to your medical records. With [employer], you can request a clinic appointment, read your test results, renew a prescription or communicate with your care team.     To access your existing account, please contact your [facility] or call [phone] for assistance.        Care EveryWhere ID     This is your Care EveryWhere ID. This could be used by other organizations to access your [place] medical records  [id number]        Your Vitals Were     Pulse Height Last Period BMI (Body Mass Index)          92 1.575 m (5' 2\") 10/24/2017 31.59 kg/m2         Blood Pressure from Last 3 Encounters:   04/16/18 153/86   04/06/18 125/84   03/19/18 157/89    Weight from Last 3 Encounters:   04/16/18 78.3 kg (172 lb 11.2 oz)   04/06/18 77.1 kg (170 lb)   03/19/18 75.1 kg (165 lb 8 oz)               Primary Care Provider Office Phone # Fax #    Dahlia Paula Noriega [phone]       [address] [place] [address] [address] [place] [address]        Equal Access to Services     BERT ACUNA : Hadii aad ku hadasho Soomaali, waaxda luqadaha, qaybta kaalmada adeegyada, kim rousseau hayjaison romero . So [facility] [phone].    ATENCIÓN: Si habla español, tiene a trevizo disposición servicios gratuitos de asistencia lingüística. Llame al [phone].    We comply with applicable federal civil rights laws and [place] laws. We do not discriminate on the basis of race, color, national origin, age, disability, sex, sexual orientation, or gender identity.            Thank you!     Thank you for choosing [facility]  for your care. Our goal is always to provide you with excellent care. Hearing back from our patients is one way we can continue to improve our services. Please " take a few minutes to complete the written survey that you may receive in the mail after your visit with us. Thank you!             Your Updated Medication List - Protect others around you: Learn how to safely use, store and throw away your medicines at www.disposemymeds.org.          This list is accurate as of 4/16/18  2:58 PM.  Always use your most recent med list.                   Brand Name Dispense Instructions for use Diagnosis    DiphenhydrAMINE HCl (Sleep) 50 MG Caps      50 mg as needed        L-THEANINE PO      Take 200 mg by mouth daily        prenatal multivitamin plus iron 27-0.8 MG Tabs per tablet      Take 1 tablet by mouth daily

## 2018-04-16 NOTE — PROGRESS NOTES
"Subjective:     37 year old  at 23w5d presents for a routine prenatal appointment.        Denies cramping/contraction, vaginal bleeding or leakage of fluid.  Reports +fetal movement.       No HA, visual changes, RUQ or epigastric pain.     Patient concerns: Feeling well overall.   Blood pressure was initially high today at 167/92. Repeat blood pressures averaged 140s/80s. She brought in her record of blood pressure readings at home; they typically ranged from 120-130s/70-80s. The highest reading was 141/81. She said that she has noticed a few being elevated since she started monitoring at the beginning of the pregnancy, but she always checks them after and they \"always come down\". She notices getting nervous prior to taking her BP at home now.     Has been seeing Dr. Huerta- per MD, no indication for BP medications unless blood pressures consistently >150/100. Has been diagnosed as white coat syndrome; per OB MDs, treat as CHTN.     Objective:  Vitals:    18 1003 18 1016 18 1017   BP: (!) 167/92 144/85 153/86   Pulse: 92     Weight: 78.3 kg (172 lb 11.2 oz)     Height: 1.575 m (5' 2\")       See OB flowsheet    Assessment/Plan     Encounter Diagnoses   Name Primary?     High-risk pregnancy, elderly primigravida Yes     White coat syndrome without hypertension      Orders Placed This Encounter   Procedures     Growth Ultrasound [address]     - Reviewed total weight gain, encouraged continued healthy diet and exercise.      - Reviewed why/how to contact provider.    Patient education/orders or handouts today:  Plan for EOB visit w labs     EOB next visit.  Growth ultrasound with next visit. Per MD, q4 week growth u/s. Weekly BPPs at 32 weeks.  Continue scheduled prenatal care, RTC in 3-4 weeks for EOB and growth US and prn if questions or concerns.   Continue collaborative care with Dr. Huerta for BP concerns. Per Dr. Huerta,     I, Elizabet Arita, LAURA, am serving as a scribe to document services " "personally performed by CNM based on the provider's statements to me.\" - Elizabet Arita, LAURA    The encounter was performed by me and scribed by the SNM. The scribed note accurately reflects my personal services and decisions made by me.     Salud Alfaro, NITHIN, ANTONIOM        "

## 2018-04-16 NOTE — LETTER
"2018       RE: Rona Krishnamurthy  [address] [facility] [address]     Dear Colleague,    Thank you for referring your patient, Rona Krishnamurthy, to the [facility] at [facility]. Please see a copy of my visit note below.    Subjective:     37 year old  at 23w5d presents for a routine prenatal appointment.        Denies cramping/contraction, vaginal bleeding or leakage of fluid.  Reports +fetal movement.       No HA, visual changes, RUQ or epigastric pain.     Patient concerns: Feeling well overall.   Blood pressure was initially high today at 167/92. Repeat blood pressures averaged 140s/80s. She brought in her record of blood pressure readings at home; they typically ranged from 120-130s/70-80s. The highest reading was 141/81. She said that she has noticed a few being elevated since she started monitoring at the beginning of the pregnancy, but she always checks them after and they \"always come down\". She notices getting nervous prior to taking her BP at home now.     Has been seeing Dr. Huerta- sumeet JACKSON, no indication for BP medications unless blood pressures consistently >150/100. Has been diagnosed as white coat syndrome; per OB MDs, treat as CHTN.     Objective:  Vitals:    18 1003 18 1016 18 1017   BP: (!) 167/92 144/85 153/86   Pulse: 92     Weight: 78.3 kg (172 lb 11.2 oz)     Height: 1.575 m (5' 2\")       See OB flowsheet    Assessment/Plan     Encounter Diagnoses   Name Primary?     High-risk pregnancy, elderly primigravida Yes     White coat syndrome without hypertension      Orders Placed This Encounter   Procedures     Growth Ultrasound [address]     - Reviewed total weight gain, encouraged continued healthy diet and exercise.      - Reviewed why/how to contact provider.    Patient education/orders or handouts today:  Plan for EOB visit w labs     EOB next visit.  Growth ultrasound with next visit. Per MD, q4 " "week growth u/s. Weekly BPPs at 32 weeks.  Continue scheduled prenatal care, RTC in 3-4 weeks for EOB and growth US and prn if questions or concerns.   Continue collaborative care with Dr. Huerta for BP concerns. Per Dr. Huerta,     I, LAURA Salcedo, am serving as a scribe to document services personally performed by CNM based on the provider's statements to me.\" - LAURA Salcedo    The encounter was performed by me and scribed by the SNM. The scribed note accurately reflects my personal services and decisions made by me.       Again, thank you for allowing me to participate in the care of your patient.      Sincerely,    Salud Alfaro CNM      "

## 2018-05-16 ENCOUNTER — MEDICAL CORRESPONDENCE (OUTPATIENT)
Dept: HEALTH INFORMATION MANAGEMENT | Facility: CLINIC | Age: 38
End: 2018-05-16

## 2018-05-18 ENCOUNTER — OFFICE VISIT (OUTPATIENT)
Dept: OBGYN | Facility: CLINIC | Age: 38
End: 2018-05-18
Attending: ADVANCED PRACTICE MIDWIFE
Payer: COMMERCIAL

## 2018-05-18 VITALS
WEIGHT: 184.1 LBS | HEART RATE: 94 BPM | SYSTOLIC BLOOD PRESSURE: 153 MMHG | DIASTOLIC BLOOD PRESSURE: 75 MMHG | HEIGHT: 62 IN | BODY MASS INDEX: 33.88 KG/M2

## 2018-05-18 DIAGNOSIS — Z00.6 RESEARCH STUDY PATIENT: ICD-10-CM

## 2018-05-18 DIAGNOSIS — O09.93 HIGH-RISK PREGNANCY IN THIRD TRIMESTER: ICD-10-CM

## 2018-05-18 DIAGNOSIS — O09.519 HIGH-RISK PREGNANCY, ELDERLY PRIMIGRAVIDA: ICD-10-CM

## 2018-05-18 DIAGNOSIS — O09.519 HIGH-RISK PREGNANCY, ELDERLY PRIMIGRAVIDA: Primary | ICD-10-CM

## 2018-05-18 DIAGNOSIS — R03.0 ELEVATED BLOOD PRESSURE READING WITHOUT DIAGNOSIS OF HYPERTENSION: ICD-10-CM

## 2018-05-18 DIAGNOSIS — R03.0 WHITE COAT SYNDROME WITHOUT HYPERTENSION: ICD-10-CM

## 2018-05-18 DIAGNOSIS — O99.810 ABNORMAL GLUCOSE AFFECTING PREGNANCY: ICD-10-CM

## 2018-05-18 DIAGNOSIS — Z23 NEED FOR TDAP VACCINATION: ICD-10-CM

## 2018-05-18 DIAGNOSIS — O99.013 ANEMIA DURING PREGNANCY IN THIRD TRIMESTER: ICD-10-CM

## 2018-05-18 LAB
ALT SERPL W P-5'-P-CCNC: 26 U/L (ref 0–50)
ANION GAP SERPL CALCULATED.3IONS-SCNC: 9 MMOL/L (ref 3–14)
AST SERPL W P-5'-P-CCNC: 17 U/L (ref 0–45)
BUN SERPL-MCNC: 5 MG/DL (ref 7–30)
CALCIUM SERPL-MCNC: 8.8 MG/DL (ref 8.5–10.1)
CHLORIDE SERPL-SCNC: 106 MMOL/L (ref 94–109)
CO2 SERPL-SCNC: 26 MMOL/L (ref 20–32)
CREAT SERPL-MCNC: 0.57 MG/DL (ref 0.52–1.04)
CREAT UR-MCNC: 15 MG/DL
DEPRECATED CALCIDIOL+CALCIFEROL SERPL-MC: 61 UG/L (ref 20–75)
ERYTHROCYTE [DISTWIDTH] IN BLOOD BY AUTOMATED COUNT: 13.1 % (ref 10–15)
FERRITIN SERPL-MCNC: 11 NG/ML (ref 12–150)
GFR SERPL CREATININE-BSD FRML MDRD: >90 ML/MIN/1.7M2
GLUCOSE 1H P 50 G GLC PO SERPL-MCNC: 160 MG/DL (ref 60–129)
GLUCOSE SERPL-MCNC: 154 MG/DL (ref 70–99)
HCT VFR BLD AUTO: 32.8 % (ref 35–47)
HGB BLD-MCNC: 10.7 G/DL (ref 11.7–15.7)
IRON SATN MFR SERPL: 14 % (ref 15–46)
IRON SERPL-MCNC: 68 UG/DL (ref 35–180)
MCH RBC QN AUTO: 28.7 PG (ref 26.5–33)
MCHC RBC AUTO-ENTMCNC: 32.6 G/DL (ref 31.5–36.5)
MCV RBC AUTO: 88 FL (ref 78–100)
PLATELET # BLD AUTO: 224 10E9/L (ref 150–450)
POTASSIUM SERPL-SCNC: 3.5 MMOL/L (ref 3.4–5.3)
PROT UR-MCNC: <0.05 G/L
PROT/CREAT 24H UR: NORMAL G/G CR (ref 0–0.2)
RBC # BLD AUTO: 3.73 10E12/L (ref 3.8–5.2)
SODIUM SERPL-SCNC: 141 MMOL/L (ref 133–144)
T PALLIDUM AB SER QL: NONREACTIVE
TIBC SERPL-MCNC: 469 UG/DL (ref 240–430)
URATE SERPL-MCNC: 4 MG/DL (ref 2.6–6)
WBC # BLD AUTO: 10.7 10E9/L (ref 4–11)

## 2018-05-18 PROCEDURE — 90471 IMMUNIZATION ADMIN: CPT

## 2018-05-18 PROCEDURE — 25000128 H RX IP 250 OP 636: Mod: ZF

## 2018-05-18 PROCEDURE — 76816 OB US FOLLOW-UP PER FETUS: CPT | Mod: ZF

## 2018-05-18 PROCEDURE — 82728 ASSAY OF FERRITIN: CPT | Performed by: ADVANCED PRACTICE MIDWIFE

## 2018-05-18 PROCEDURE — 84460 ALANINE AMINO (ALT) (SGPT): CPT | Performed by: ADVANCED PRACTICE MIDWIFE

## 2018-05-18 PROCEDURE — 82950 GLUCOSE TEST: CPT | Performed by: ADVANCED PRACTICE MIDWIFE

## 2018-05-18 PROCEDURE — 84550 ASSAY OF BLOOD/URIC ACID: CPT | Performed by: ADVANCED PRACTICE MIDWIFE

## 2018-05-18 PROCEDURE — 84450 TRANSFERASE (AST) (SGOT): CPT | Performed by: ADVANCED PRACTICE MIDWIFE

## 2018-05-18 PROCEDURE — 83550 IRON BINDING TEST: CPT | Performed by: ADVANCED PRACTICE MIDWIFE

## 2018-05-18 PROCEDURE — 84156 ASSAY OF PROTEIN URINE: CPT | Performed by: ADVANCED PRACTICE MIDWIFE

## 2018-05-18 PROCEDURE — 80048 BASIC METABOLIC PNL TOTAL CA: CPT | Performed by: ADVANCED PRACTICE MIDWIFE

## 2018-05-18 PROCEDURE — 85027 COMPLETE CBC AUTOMATED: CPT | Performed by: ADVANCED PRACTICE MIDWIFE

## 2018-05-18 PROCEDURE — 82306 VITAMIN D 25 HYDROXY: CPT | Performed by: ADVANCED PRACTICE MIDWIFE

## 2018-05-18 PROCEDURE — G0463 HOSPITAL OUTPT CLINIC VISIT: HCPCS | Mod: ZF

## 2018-05-18 PROCEDURE — 83540 ASSAY OF IRON: CPT | Performed by: ADVANCED PRACTICE MIDWIFE

## 2018-05-18 PROCEDURE — 86780 TREPONEMA PALLIDUM: CPT | Performed by: ADVANCED PRACTICE MIDWIFE

## 2018-05-18 PROCEDURE — 90715 TDAP VACCINE 7 YRS/> IM: CPT | Mod: ZF

## 2018-05-18 PROCEDURE — 36415 COLL VENOUS BLD VENIPUNCTURE: CPT | Performed by: ADVANCED PRACTICE MIDWIFE

## 2018-05-18 NOTE — MR AVS SNAPSHOT
After Visit Summary   5/18/2018    Rona Krishnamurthy    MRN: [id number]           Patient Information     Date Of Birth          1980        Visit Information        Provider Department      5/18/2018 9:30 AM [facility] ULTRASOUND II [facility]        Today's Diagnoses     High-risk pregnancy, elderly primigravida        White coat syndrome without hypertension           Follow-ups after your visit        Your next 10 appointments already scheduled     May 23, 2018  7:30 AM CDT   LAB with OP LAB UR   [facility] [place] ([facility])    [address]  [facility] [address]   [phone]           Please do not eat 10-12 hours before your appointment if you are coming in fasting for labs on lipids, cholesterol, or glucose (sugar). This does not apply to pregnant women. Water, hot tea and black coffee (with nothing added) are okay. Do not drink other fluids, diet soda or chew gum.            Jun 01, 2018  1:00 PM CDT   RETURN OB with NITHIN Velasquez CNM   [facility] ([facility])    [place] [facility] [address]  [address]  [address]  [facility] [id number] [phone]              Future tests that were ordered for you today     Open Future Orders        Priority Expected Expires Ordered    Glucose 3 Hour Routine  5/18/2019 5/18/2018            Who to contact     Please call your clinic at [phone] to:    Ask questions about your health    Make or cancel appointments    Discuss your medicines    Learn about your test results    Speak to your doctor            Additional Information About Your Visit        MyChart Information     [employer] gives you secure access to your electronic health record. If you see a primary care provider, you can also send messages to your care team and make appointments. If you have questions, please call your primary care clinic.   If you do not have a primary care provider, please call [phone] and they will assist you.      [employer] is an electronic gateway that provides easy, online access to your medical records. With [employer], you can request a clinic appointment, read your test results, renew a prescription or communicate with your care team.     To access your existing account, please contact your [facility] or call [phone] for assistance.        Care EveryWhere ID     This is your Care EveryWhere ID. This could be used by other organizations to access your [place] medical records  [id number]        Your Vitals Were     Last Period                   10/24/2017            Blood Pressure from Last 3 Encounters:   05/18/18 153/75   04/16/18 153/86   04/06/18 125/84    Weight from Last 3 Encounters:   05/18/18 83.5 kg (184 lb 1.6 oz)   04/16/18 78.3 kg (172 lb 11.2 oz)   04/06/18 77.1 kg (170 lb)              We Performed the Following     Growth Ultrasound [address]        Primary Care Provider Office Phone # Fax #    Dahlia Mitchell Bradley Noriega [phone]       [address] [place] [address] 741  [facility] [address]        Equal Access to Services     BERT ACUNA : Hadii mak ku hadasho Solashawnali, waaxda luqadaha, qaybta kaalmada adeegyada, kim bryan. So [facility] [phone].    ATENCIÓN: Si habla español, tiene a trevizo disposición servicios gratuitos de asistencia lingüística. Llame al [phone].    We comply with applicable federal civil rights laws and [place] laws. We do not discriminate on the basis of race, color, national origin, age, disability, sex, sexual orientation, or gender identity.            Thank you!     Thank you for choosing [facility]  for your care. Our goal is always to provide you with excellent care. Hearing back from our patients is one way we can continue to improve our services. Please take a few minutes to  complete the written survey that you may receive in the mail after your visit with us. Thank you!             Your Updated Medication List - Protect others around you: Learn how to safely use, store and throw away your medicines at www.disposemymeds.org.          This list is accurate as of 5/18/18  2:27 PM.  Always use your most recent med list.                   Brand Name Dispense Instructions for use Diagnosis    DiphenhydrAMINE HCl (Sleep) 50 MG Caps      50 mg as needed        L-THEANINE PO      Take 200 mg by mouth daily        prenatal multivitamin plus iron 27-0.8 MG Tabs per tablet      Take 1 tablet by mouth daily

## 2018-05-18 NOTE — PROGRESS NOTES
"   37 year old, , 28w2d presents to EOB with FOB   The patient presents with the following concerns:    - Heartburn at night 1 TUMS helps.  Water only.    - Taking Blood pressures at home, has log for review .  BP range 115-133/76-84  - Deciding on clinic for Pediatricians.   - Has all in one express UVALDO class scheduled   - Planning birth tour - information to schedule given     PHQ-9 SCORE 2018   Total Score 0     Education completed today includes breast feeding, [facility] hand out , contraception, counting movements, signs of pre-term labor, when to present to birthplace, post partum depression, GBS, getting enough iron and labor induction.  Birth preferences reviewed: Undecided. Trying to stay flexible.  Considering Nitrous or Epidural.   Labor support:    Undecided if she will use  support     Feeding plans :    Contraception planned:  Undecided.  Used pills in the past but not very routinely.  Used acupuncture for fertility for this pregnancy.  Wouldn't want to wait \"very long\".  Reviewed IUD as good option for guaranteed spacing of ~1 year; pt will consider.   The following labs were ordered today:       GCT, CBC w platelets, Vitamin D and Anti-treponema.  Pre E Labs were added after blood pressure reading done at end of visit.  Pt already had voided, no urine sample available for Pr/Cr ratio.     Water birth consent form was not given.    Blood type:   ABO   Date Value Ref Range Status   2017 A  Final     RH(D)   Date Value Ref Range Status   2017 Pos  Final     Antibody Screen   Date Value Ref Range Status   2017 Neg  Final   Rhogam  was notgiven.  TDAP  Was given.    Growth US 2018 in office \"37 year old female, , presents at 28 2/7 weeks for obstetric ultrasound assessment indicated by chronic hypertension(white coat syndrome).   Single fetus   Presentation - cephalic   USEGA = 29 3/7 weeks.  EFW = 1,367 grams, 74 % for 28 weeks.   BENEDICTO = " "21.5cm.  FHR = 155bpm    Placenta anterior and grade 0\"      A/P:  Encounter Diagnoses   Name Primary?     High-risk pregnancy, elderly primigravida - [facility] CNM Yes     High-risk pregnancy in third trimester      Need for Tdap vaccination      White coat syndrome without hypertension      Elevated blood pressure reading without diagnosis of hypertension- per OB- treat as CHTN for pregnancy      Research study patient-check to see if patient has occlusive device when admitted for labor      Orders Placed This Encounter   Procedures     TDAP VACCINE (BOOSTRIX) >/= 27 weeks     Glucose tolerance gest screen 1 hour [[id number]]     25- OH-Vitamin D     CBC with platelets     Treponema Abs w Reflex to RPR and Titer [[id number]]     AST     ALT     Uric acid     Basic Metabolic Panel     Protein  random urine with Creat Ratio         Pt has 1 hour glucose draw timed for 1100.  Will go to have blood drawn then return to clinic for BP protocol.  Pre E labs added to 3rd trimester labs.     Continue scheduled prenatal care  NITHIN Lu CNM        ADDENDUM  5/18/2018 11:34 AM  - Pt came back to clinic for BP protocol - average was  150/78.  Per high risk rounds/Dr. Huerta consult consider labetalol if >150/>90.  Pt able to lave  Pr/Cr ratio urine sample in office.  Reinforced warning s/s of Pre E and how/why to contact CNM prn.  Reinforced if home BP monitoring shows >140/>90 contact CNM asap for follow up.      ADDENDUM 5/18/2018 12:53 PM  - Noted CBC with Hg<11.  Iron studies added.    - Noted failed 1 hour glucose, 3 hour ordered.       "

## 2018-05-18 NOTE — MR AVS SNAPSHOT
After Visit Summary   5/18/2018    Rona Krishnamurthy    MRN: [id number]           Patient Information     Date Of Birth          1980        Visit Information        Provider Department      5/18/2018 10:00 AM Humaira Christie APRN CNM [facility]        Today's Diagnoses     High-risk pregnancy, elderly primigravida - [facility] CNM    -  1    High-risk pregnancy in third trimester        Need for Tdap vaccination        White coat syndrome without hypertension        Elevated blood pressure reading without diagnosis of hypertension- per OB- treat as CHTN for pregnancy        Research study patient-check to see if patient has occlusive device when admitted for labor        Anemia during pregnancy in third trimester        Abnormal glucose affecting pregnancy           Follow-ups after your visit        Follow-up notes from your care team     Will only call if abnormal Return in about 2 weeks (around 6/1/2018) for LACHO.      Your next 10 appointments already scheduled     May 29, 2018  2:30 PM CDT   (Arrive by 2:15 PM)   Office Visit with Binta Hobbs RN   [facility] ([facility])    [address]  [address] [facility] [address] [phone]           Bring a current list of meds and any records pertaining to this visit. For Physicals, please bring immunization records and any forms needing to be filled out. Please arrive 10 minutes early to complete paperwork.            Jun 01, 2018  1:00 PM CDT   RETURN OB with NITHIN Velasquez CNM   [facility] ([facility])    [facility] [address]  [address] [facility] [address]  [address] [facility] [address] [phone]              Who to contact     Please call your clinic at [phone] to:    Ask questions about your health    Make or cancel appointments    Discuss your medicines    Learn about your test results    Speak to your doctor       "      Additional Information About Your Visit        [employer] Information     [employer] gives you secure access to your electronic health record. If you see a primary care provider, you can also send messages to your care team and make appointments. If you have questions, please call your primary care clinic.  If you do not have a primary care provider, please call [phone] and they will assist you.      [employer] is an electronic gateway that provides easy, online access to your medical records. With [employer], you can request a clinic appointment, read your test results, renew a prescription or communicate with your care team.     To access your existing account, please contact your [facility] or call [phone] for assistance.        Care EveryWhere ID     This is your Care EveryWhere ID. This could be used by other organizations to access your [place] medical records  [id number]        Your Vitals Were     Pulse Height Last Period BMI (Body Mass Index)          94 1.575 m (5' 2.01\") 10/24/2017 33.66 kg/m2         Blood Pressure from Last 3 Encounters:   No data found for BP    Weight from Last 3 Encounters:   No data found for Wt              Today, you had the following     No orders found for display       Primary Care Provider Office Phone # Fax #    Dahlia Paula Noriega [phone]       [address] [place] [address] [address]  [facility] [address]        Equal Access to Services     BERT ACUNA : Hadii aad ku hadasho Soomaali, waaxda luqadaha, qaybta kaalmada adeegyada, kim bryan. So [facility] [phone].    ATENCIÓN: Si habla español, tiene a trevizo disposición servicios gratuitos de asistencia lingüística. Llame al [phone].    We comply with applicable federal civil rights laws and [place] laws. We do not discriminate on the basis of race, color, national origin, age, disability, sex, sexual orientation, or gender identity.       "      Thank you!     Thank you for choosing [facility]  for your care. Our goal is always to provide you with excellent care. Hearing back from our patients is one way we can continue to improve our services. Please take a few minutes to complete the written survey that you may receive in the mail after your visit with us. Thank you!             Your Updated Medication List - Protect others around you: Learn how to safely use, store and throw away your medicines at www.disposemymeds.org.          This list is accurate as of 5/18/18 11:59 PM.  Always use your most recent med list.                   Brand Name Dispense Instructions for use Diagnosis    DiphenhydrAMINE HCl (Sleep) 50 MG Caps      50 mg as needed        L-THEANINE PO      Take 200 mg by mouth daily        prenatal multivitamin plus iron 27-0.8 MG Tabs per tablet      Take 1 tablet by mouth daily

## 2018-05-18 NOTE — LETTER
"2018     RE: Rona Krishnamurthy  [address] [facility] [address]     Dear Colleague,    Thank you for referring your patient, Rona Krishnamurthy, to the [facility] at [facility]. Please see a copy of my visit note below.       37 year old, , 28w2d presents to EOB with FOB   The patient presents with the following concerns:    - Heartburn at night 1 TUMS helps.  Water only.    - Taking Blood pressures at home, has log for review .  BP range 115-133/76-84  - Deciding on clinic for Pediatricians.   - Has all in one express UVALDO class scheduled   - Planning birth tour - information to schedule given     PHQ-9 SCORE 2018   Total Score 0     Education completed today includes breast feeding, [facility] hand out , contraception, counting movements, signs of pre-term labor, when to present to birthplace, post partum depression, GBS, getting enough iron and labor induction.  Birth preferences reviewed: Undecided. Trying to stay flexible.  Considering Nitrous or Epidural.   Labor support:    Undecided if she will use  support     Feeding plans :    Contraception planned:  Undecided.  Used pills in the past but not very routinely.  Used acupuncture for fertility for this pregnancy.  Wouldn't want to wait \"very long\".  Reviewed IUD as good option for guaranteed spacing of ~1 year; pt will consider.   The following labs were ordered today:       GCT, CBC w platelets, Vitamin D and Anti-treponema.  Pre E Labs were added after blood pressure reading done at end of visit.  Pt already had voided, no urine sample available for Pr/Cr ratio.     Water birth consent form was not given.    Blood type:   ABO   Date Value Ref Range Status   2017 A  Final     RH(D)   Date Value Ref Range Status   2017 Pos  Final     Antibody Screen   Date Value Ref Range Status   2017 Neg  Final   Rhogam  was notgiven.  TDAP  Was " "given.    Growth US 2018 in office \"37 year old female, , presents at 28 2/7 weeks for obstetric ultrasound assessment indicated by chronic hypertension(white coat syndrome).   Single fetus   Presentation - cephalic   USEGA = 29 3/7 weeks.  EFW = 1,367 grams, 74 % for 28 weeks.   BENEDICTO = 21.5cm.  FHR = 155bpm    Placenta anterior and grade 0\"      A/P:  Encounter Diagnoses   Name Primary?     High-risk pregnancy, elderly primigravida - [facility] CNM Yes     High-risk pregnancy in third trimester      Need for Tdap vaccination      White coat syndrome without hypertension      Elevated blood pressure reading without diagnosis of hypertension- per OB- treat as CHTN for pregnancy      Research study patient-check to see if patient has occlusive device when admitted for labor      Orders Placed This Encounter   Procedures     TDAP VACCINE (BOOSTRIX) >/= 27 weeks     Glucose tolerance gest screen 1 hour [[id number]]     25- OH-Vitamin D     CBC with platelets     Treponema Abs w Reflex to RPR and Titer [[id number]]     AST     ALT     Uric acid     Basic Metabolic Panel     Protein  random urine with Creat Ratio         Pt has 1 hour glucose draw timed for 1100.  Will go to have blood drawn then return to clinic for BP protocol.  Pre E labs added to 3rd trimester labs.     Continue scheduled prenatal care  NITHIN Lu CN        ADDENDUM  2018 11:34 AM  - Pt came back to clinic for BP protocol - average was  150/78.  Per high risk rounds/Dr. Huerta consult consider labetalol if >150/>90.  Pt able to lave  Pr/Cr ratio urine sample in office.  Reinforced warning s/s of Pre E and how/why to contact CNM prn.  Reinforced if home BP monitoring shows >140/>90 contact CN asap for follow up.      ADDENDUM 2018 12:53 PM  - Noted CBC with Hg<11.  Iron studies added.    - Noted failed 1 hour glucose, 3 hour ordered.       Again, thank you for allowing me to participate in the care of your patient.  "     Sincerely,    NITHIN LuM

## 2018-05-18 NOTE — PROGRESS NOTES
37 year old female, , presents at 28 2/7 weeks for obstetric ultrasound assessment indicated by chronic hypertension(white coat syndrome).    Single fetus    Presentation - cephalic    USEGA = 29 3/7 weeks.  EFW = 1,367 grams, 74 % for 28 weeks.      BENEDICTO = 21.5cm.  FHR = 155bpm     Placenta anterior and grade 0    Comments: Appropriate interval growth    Findings discussed with patient.    Further studies: weekly BPPs to start at 32 weeks and continued serial growth ultrasounds.        REGGIE Gallardo MD

## 2018-05-18 NOTE — NURSING NOTE
Chief Complaint   Patient presents with     Prenatal Care     Pt is 28.2 week. Pt had no concerns. Pt BP was 173/94. Did bp protocol average bp 150/78.

## 2018-05-23 DIAGNOSIS — O99.810 ABNORMAL GLUCOSE AFFECTING PREGNANCY: ICD-10-CM

## 2018-05-23 LAB
GLUCOSE 1H P 100 G GLC PO SERPL-MCNC: 223 MG/DL (ref 60–179)
GLUCOSE 2H P 100 G GLC PO SERPL-MCNC: 245 MG/DL (ref 60–154)
GLUCOSE 3H P 100 G GLC PO SERPL-MCNC: 142 MG/DL (ref 60–139)
GLUCOSE P FAST SERPL-MCNC: 83 MG/DL (ref 60–94)

## 2018-05-23 PROCEDURE — 82951 GLUCOSE TOLERANCE TEST (GTT): CPT | Performed by: ADVANCED PRACTICE MIDWIFE

## 2018-05-23 PROCEDURE — 36415 COLL VENOUS BLD VENIPUNCTURE: CPT | Performed by: ADVANCED PRACTICE MIDWIFE

## 2018-05-23 PROCEDURE — 82952 GTT-ADDED SAMPLES: CPT | Performed by: ADVANCED PRACTICE MIDWIFE

## 2018-05-24 ENCOUNTER — TELEPHONE (OUTPATIENT)
Dept: OBGYN | Facility: CLINIC | Age: 38
End: 2018-05-24

## 2018-05-24 DIAGNOSIS — O24.410 DIET CONTROLLED GESTATIONAL DIABETES MELLITUS (GDM) IN THIRD TRIMESTER: Primary | ICD-10-CM

## 2018-05-24 NOTE — TELEPHONE ENCOUNTER
Spoke with Tiffanie about results, she has failed 3 hour glucose. Instructed patient to contact diabetic education to schedule an appointment. Patient agreeable to plan.

## 2018-05-25 DIAGNOSIS — O24.410 DIET CONTROLLED GESTATIONAL DIABETES MELLITUS (GDM) IN THIRD TRIMESTER: Primary | ICD-10-CM

## 2018-05-29 ENCOUNTER — OFFICE VISIT (OUTPATIENT)
Dept: EDUCATION SERVICES | Facility: CLINIC | Age: 38
End: 2018-05-29
Payer: COMMERCIAL

## 2018-05-29 DIAGNOSIS — O24.419 GESTATIONAL DIABETES: Primary | ICD-10-CM

## 2018-05-29 NOTE — PROGRESS NOTES
"Diabetes Self-Management Training -Pregnancy Complicated by Diabetes    SUBJECTIVE:  Rona Krishnamurthy presents today for  education related to  Pregnancy complicated by diabetes (GDM)  She is accompanied by self  Patient concerns: is concerned about the fact that she drinks a lot of sweetened beverages and doesn't like any vegetables except starchy ones.     LMP 10/24/2017    Lab Results   Component Value Date     05/18/2018       History   Smoking Status     Never Smoker   Smokeless Tobacco     Never Used       SOCIO/ECONOMIC HISTORY:  Lives with: self  Recent family changes/social stressors: none noted  Language(s) spoken at home: English    ASSESSMENT:       Due date:  August 8, 2018  Previous pregnancies? No      Profession Works as a  for [employer]--most of her work is done via computer or phone from home.   Prepregnancy weight 155 lbs Height: 62 inches.  Weight gain to date at 30 weeks is 26 lbs     Special dietary considerations (Uatsdin restrictions or food intolerance or allergies)  Exercise habits:  Sporadically active:    Problems in current pregnancy:  None.    Patient was instructed on One Touch Verio meter and was able to provide an accurate return demonstration. She is currently using a One Touch Ultra 2 meter, however she would like to get one that will use a phone application.      Medications reviewed and updated today.    Educational topics covered today:  Pathophysiology of diabetes in pregnancy, Risks and Complications, Using a Blood Glucose Monitor, Blood Glucose Goals, Logging and Interpreting Glucose Results, Ketone Testing, When to Call a Diabetes Educator, endocrinologist or OB Provider    Rona states that her \"one vice\" is that she likes sweetened beverages and for instance, drinks a regular Red Bull every morning with breakfast.  She also drinks regular soda, to the tune of 4-5 12 ounce drinks per day.  She wanted some information about artificial " sweeteners and wondering if she REALLY needs to stop drinking sugared beverages.  Discussed a healthy diet.  She is currently working on this, although she says that it is really hard for her to eat any vegetables at all except potatoes, corn, peas.  She seems to have some problems with the texture of vegetables, and states that her mother never really made a point of making the kids eat vegetables when she was growing up, so she didn't grow up eating them.  She said she can stand salads, but only if the only ingredient is lettuce.      Discussed some artificial sweeteners.  Talked about the properties of each.  Encouraged her to find alternatives to drinking soda and Red Bull. Suggested that she try some carbonated flavored stephenson. Discussed Sucralose and Truvia and encouraged her to consider these if she needs something different than water.  She doesn't like tea.   Encouraged her to at least try one new vegetable between now and the time she meets with our dietitian on Friday.      Recommended carb intake:  30 grams at breakfast, 45-60 grams at lunch and dinner, and 15-30 grams of CHO for snacks.  Encouraged walking either on a treadmill or outside for 20 minutes after each meal.  She was willing to do this.     Also discussed the use of insulin.  Her 3H GGT was significantly elevated at 1, 2 and 3 hours.  Her fasting blood glucose was OK.  She states that she is resigned to taking insulin if it looks like it is indicated.  She is looking forward to meeting with our dietitian and working on increasing her activity, but states that she will do whatever is required to get BG's under control.         EDUCATIONAL MATERIALS:   [facility] GDM BOOK  [place] GUIDE TO CARB COUNTING    PLAN:  Check glucose 4 times daily, before breakfast and 1 hour after each meal.  Check Ketones daily for 1 week, reduce testing to once a week if result is consistently negative.     FOLLOW-UP:  Follow up with RD-HAOE in 3 days.  Follow up  with RN-CDE in 3 weeks.   Phone or email BG's to CDE in one week.        Time spent was 90 minutes  Encounter type: Individual    Any diabetes medication dose changes were made via the CDE Protocol and Collaborative [facility].  A copy of this encounter was provided to patient's referring provider.

## 2018-05-29 NOTE — MR AVS SNAPSHOT
After Visit Summary   5/29/2018    Rona Krishnamurthy    MRN: [id number]           Patient Information     Date Of Birth          1980        Visit Information        Provider Department      5/29/2018 2:30 PM Binta Hobbs RN [facility]        Today's Diagnoses     Gestational diabetes    -  1       Follow-ups after your visit        Your next 10 appointments already scheduled     Jun 01, 2018  1:00 PM CDT   RETURN OB with NITHIN Velasquez CNM   [facility] ([facility])    [facility] [address]  [address]  [address]  [facility] [id number] [phone]              Who to contact     Please call your clinic at [phone] to:    Ask questions about your health    Make or cancel appointments    Discuss your medicines    Learn about your test results    Speak to your doctor            Additional Information About Your Visit        MyChart Information     [email or web address] gives you secure access to your electronic health record. If you see a primary care provider, you can also send messages to your care team and make appointments. If you have questions, please call your primary care clinic.  If you do not have a primary care provider, please call [phone] and they will assist you.      [email or web address] is an electronic gateway that provides easy, online access to your medical records. With [email or web address], you can request a clinic appointment, read your test results, renew a prescription or communicate with your care team.     To access your existing account, please contact your [facility] or call [phone] for assistance.        Care EveryWhere ID     This is your Care EveryWhere ID. This could be used by other organizations to access your [place] medical records  [id number]        Your Vitals Were     Last Period                   10/24/2017            Blood Pressure from Last 3 Encounters:   05/18/18 153/75    04/16/18 153/86   04/06/18 125/84    Weight from Last 3 Encounters:   05/18/18 83.5 kg (184 lb 1.6 oz)   04/16/18 78.3 kg (172 lb 11.2 oz)   04/06/18 77.1 kg (170 lb)              We Performed the Following     DIABETES EDUCATION - Individual  []          Today's Medication Changes          These changes are accurate as of 5/29/18  5:43 PM.  If you have any questions, ask your nurse or doctor.               Start taking these medicines.        Dose/Directions    acetone (Urine) test Strp   Used for:  Gestational diabetes        Test first voiding of the day.   Quantity:  50 each   Refills:  11       blood glucose monitoring test strip   Commonly known as:  no brand specified   Used for:  Gestational diabetes        One Touch Verio test strips.  Test 4 times daily.   Quantity:  125 strip   Refills:  11            Where to get your medicines      These medications were sent to [facility]/pharmacy [id number] [address]     Phone:  [phone]     acetone (Urine) test Strp    blood glucose monitoring test strip                Primary Care Provider Office Phone # Fax #    Dahlia Paula Noriega [phone]       [address] [place] [address] [address] [place] [address]        Equal Access to Services     BERT ACUNA AH: Liset hensleyo Soomaali, waaxda luqadaha, qaybta kaalmada adeegyada, kim bryan. So [facility] [phone].    ATENCIÓN: Si habla español, tiene a trevizo disposición servicios gratuitos de asistencia lingüística. Llame al [phone].    We comply with applicable federal civil rights laws and [place] laws. We do not discriminate on the basis of race, color, national origin, age, disability, sex, sexual orientation, or gender identity.            Thank you!     Thank you for choosing [facility]  for your care. Our goal is always to provide you with excellent care. Hearing back from our  patients is one way we can continue to improve our services. Please take a few minutes to complete the written survey that you may receive in the mail after your visit with us. Thank you!             Your Updated Medication List - Protect others around you: Learn how to safely use, store and throw away your medicines at www.disposemymeds.org.          This list is accurate as of 5/29/18  5:43 PM.  Always use your most recent med list.                   Brand Name Dispense Instructions for use Diagnosis    acetone (Urine) test Strp     50 each    Test first voiding of the day.    Gestational diabetes       blood glucose monitoring test strip    no brand specified    125 strip    One Touch Verio test strips.  Test 4 times daily.    Gestational diabetes       DiphenhydrAMINE HCl (Sleep) 50 MG Caps      50 mg as needed        L-THEANINE PO      Take 200 mg by mouth daily        prenatal multivitamin plus iron 27-0.8 MG Tabs per tablet      Take 1 tablet by mouth daily

## 2018-06-01 ENCOUNTER — OFFICE VISIT (OUTPATIENT)
Dept: EDUCATION SERVICES | Facility: CLINIC | Age: 38
End: 2018-06-01
Attending: NUTRITIONIST
Payer: COMMERCIAL

## 2018-06-01 ENCOUNTER — OFFICE VISIT (OUTPATIENT)
Dept: OBGYN | Facility: CLINIC | Age: 38
End: 2018-06-01
Attending: ADVANCED PRACTICE MIDWIFE
Payer: COMMERCIAL

## 2018-06-01 VITALS
SYSTOLIC BLOOD PRESSURE: 148 MMHG | HEART RATE: 102 BPM | WEIGHT: 184 LBS | BODY MASS INDEX: 33.86 KG/M2 | HEIGHT: 62 IN | DIASTOLIC BLOOD PRESSURE: 86 MMHG

## 2018-06-01 DIAGNOSIS — O09.93 HRP (HIGH RISK PREGNANCY), THIRD TRIMESTER: Primary | ICD-10-CM

## 2018-06-01 DIAGNOSIS — O24.410 DIET CONTROLLED GESTATIONAL DIABETES MELLITUS (GDM) IN THIRD TRIMESTER: Primary | ICD-10-CM

## 2018-06-01 DIAGNOSIS — O13.3 PREGNANCY-INDUCED HYPERTENSION IN THIRD TRIMESTER: ICD-10-CM

## 2018-06-01 PROCEDURE — G0463 HOSPITAL OUTPT CLINIC VISIT: HCPCS | Mod: ZF

## 2018-06-01 PROCEDURE — G0108 DIAB MANAGE TRN  PER INDIV: HCPCS | Performed by: NUTRITIONIST

## 2018-06-01 NOTE — PROGRESS NOTES
Subjective:      37 year old  at 30w2d presentst for a routine prenatal appointment.    Denies vaginal bleeding or leakage of fluid.  Denies contractions. Reports fetal movement.       No HA, visual changes, RUQ or epigastric pain.   Patient concerns: Has started checking blood glucose at home.  BS have been ranging from 244 to 98 PP.  Meeting with Diabetic Ed today.  Continues home BP monitoring, 118-140/71-89.  Discussed elevated BP today in clinic meets criteria for GHTN, discussed twice weekly BPP's starting at next visit and early IOL.   Feeling well overall.  Reviewed EOB labs with patient.    Objective:  Vitals:    18 1320 18 1321 18 1322 18 1323   BP: 148/88 146/85 150/87 148/86   BP Location:       Patient Position:       Pulse:       Weight:       Height:       , see ob flowsheet  Assessment/Plan     Encounter Diagnoses   Name Primary?     Gestational Hypertension      HRP (high risk pregnancy), third trimester Yes     Orders Placed This Encounter   Procedures     BPP (Single) w/out NST (In Clinic)       ABO   Date Value Ref Range Status   2017 A  Final     RH(D)   Date Value Ref Range Status   2017 Pos  Final     Antibody Screen   Date Value Ref Range Status   2017 Neg  Final     - Reviewed total weight gain, encouraged continued healthy diet and exercise.    Reviewed importance of daily fetal kick count and why/how to contact provider.    - Reviewed why/how to contact provider if headache/visual changes/RUQ or epigastric pain, decreased fetal movement, vaginal bleeding, leakage of fluid or more than 4 contractions in an hour.     Patient education/orders or handouts today:  PTL signs/symptoms and Childbirth Ed classes  Twice weekly BPP's to start at next visit, plan repeat labs at that visit too.      Return to clinic in 2 weeks and prn if questions or concerns.     NITHIN Velasquez CNM

## 2018-06-01 NOTE — LETTER
Date:June 5, 2018      Patient was self referred, no letter generated. Do not send.        [facility] Information

## 2018-06-01 NOTE — PROGRESS NOTES
Diabetes Self-Management Training - Gestational Diabetes    SUBJECTIVE/OBJECTIVE:  Rona Krishnamurthy presents today for education related to gestational diabetes.  She is accompanied by self  Approximately 30 weeks gestation. This is her first pregnancy.   Patient's gestational diabetes management related comments/concerns: Patient was seen earlier this week by Binta Hobbs RN CDE. She has since made some adjustments to her diet and has been testing her glucose four times per day. She has yet to start checking ketones as she just picked up the strips from the pharmacy. Patient is concerned about high readings after eating. She wonders if there is anything else she can do with diet and is looking for some feedback today. Patient also notes that her main concern is the health of her baby and she is willing to start insulin if it is determined that it is needed.     Patient's emotional response to diabetes: expresses readiness to learn    Relevant co-morbidities and related health problems:  Significant for:  none    Current health service and resource utilization related to diabetes (hyperglycemia, hypoglycemia, etc.):  None    LMP 10/24/2017    Pre pregnancy weight: 150#    Height 62 inches  Weight gain 184 lbs at 30 weeks gestation.    Estimated Date of Delivery: Aug 8, 2018    Fasting Glucose  Lab Results   Component Value Date     05/18/2018       1 hour OGTT  Lab Results   Component Value Date    GLU1 160 (H) 05/18/2018   ]    3 hour OGTT    Fasting  Lab Results   Component Value Date    GLF 83 05/23/2018   ]    1 hour  Lab Results   Component Value Date    GL1 223 (H) 05/23/2018       2 hour  Lab Results   Component Value Date    GL2 245 (H) 05/23/2018   ]    3 hour  Lab Results   Component Value Date    GL3 142 (H) 05/23/2018       History   Smoking Status     Never Smoker   Smokeless Tobacco     Never Used       Lifestyle and Health Behaviors:  Physical Activity: doing more walking this week. Trying  "to walk after every meal.   Reports that she can get even more consistent with this in the coming days.  She is using a treadmill at home or walking outside.     Nutrition:  Patient has made impressive changes since meeting with Binta Hobbs. She has stopped drinking regular soda and since the  also stopped the morning Red Bull. She is counting carbohydrates and trying to stick within the meal plan guidelines using my fitness pal. Some days she has had 60 grams of carbohydrate at breakfast. Her diet is still low in produce but she does have a plan to start with incorporating celery and carrots this week.  Pre-Grisel Vitamin: Yes      Experiencing nausea?  No     Socio/Economic/Cultural considerations:  Support System: spouse/significant other    Cultural Influences/Ethnic Background:  American    Health Literacy/Numeracy:  \"With diabetes, it's helpful to use forms and log books to write down blood sugars and what you're eating at times to help understand how foods affect your blood sugars. With this in mind:    How confident are you at filling out medical forms, such as these by yourself?   Extremely    Health Beliefs and Attitudes:   Stage of Change: ACTION (Actively working towards change)    ASSESSMENT:  Rona has started checking her glucose, readings as follows:  Date Fasting glucose One hour after breakfast Two hours after breakfast One hour after lunch  Two hours after lunch One hour after dinner Two hours after lunch    95 137   129      84 151  210  277 101    94 185  140 143  182     93 134 110 148 157 (fries) 122     88 110 102 130 92 134 117    105 120 84 190 175 126 110                         Of note patient is not washing her hands before post meal checks and some of the foods are eaten with hands. Some of the readings may not be reliable because of this. Patient will start washing her hands prior to testing from now on.     INTERVENTION:  Educational topics covered " today:  Using a Blood Glucose Monitor, Blood Glucose Goals, Logging and Interpreting Glucose Results  Ketone Testing  When to Call a Diabetes Educator or OB Provider  Healthy Eating During Pregnancy, Counting Carbohydrates, Meal Planning for GDM, and Physical Activity  Insulin injection technique taught using a Pen for NPH. Patient verbalized understanding and was able to perform an accurate return demonstration of injection technique.  Discussed mixing insulin, storage, sharps, new needle each use, site selection, action of insulin.   Hypoglycemia signs, symptoms and treatment was not discussed today so patient will need this education before she starts insulin.          Educational materials provided today:   Skinny Understanding Gestational Diabetes    Pt verbalized understanding of concepts discussed and recommendations provided today.     PLAN:  Check glucose 4 times daily, before breakfast and 1 hour after each meal. Wash your hands prior to testing.  Check Ketones daily for one week, if negative, reduce testing to once a week.   Physical activity recommended: walk daily after meals.  Meal plan: 30-45g carbs at breakfast, 45-60g carbs at lunch, 45-60g carbs at supper, 15-30g carbs at 3 snacks a day.  This means you will need to reduce your carbohydrate intake a little at breakfast and instead try to add a mid morning snack. Use some of the healthy balanced snacks we came up with today.  Great job eliminating the sugary drinks from your diet!  Follow consistent CHO meal plan, eat CHO and protein/fat at all meals/snacks.    Call/e-mail/MyChart message diabetes educator if 3 or more blood sugars are above the goal in 1 week or if ketones are positive.     Call/e-mail/MyChart message with questions/concerns.    FOLLOW-UP:  Phone follow up on Monday June 4th scheduled to review glucose and any diet concerns.  Time Spent: 60 minutes  Encounter type: Individual

## 2018-06-01 NOTE — NURSING NOTE
Chief Complaint   Patient presents with     Prenatal Care     30w2d       Kianna Li       B/p average 148/86

## 2018-06-01 NOTE — LETTER
2018       RE: Rona Krishnamurthy  [address] [facility] [address]     Dear Colleague,    Thank you for referring your patient, Rona Krishnamurthy, to the [facility] at [facility]. Please see a copy of my visit note below.    Subjective:      37 year old  at 30w2d presentst for a routine prenatal appointment.    Denies vaginal bleeding or leakage of fluid.  Denies contractions. Reports fetal movement.       No HA, visual changes, RUQ or epigastric pain.   Patient concerns: Has started checking blood glucose at home.  BS have been ranging from 244 to 98 PP.  Meeting with Diabetic Ed today.  Continues home BP monitoring, 118-140/71-89.  Discussed elevated BP today in clinic meets criteria for GHTN, discussed twice weekly BPP's starting at next visit and early IOL.   Feeling well overall.  Reviewed EOB labs with patient.    Objective:  Vitals:    18 1320 18 1321 18 1322 18 1323   BP: 148/88 146/85 150/87 148/86   BP Location:       Patient Position:       Pulse:       Weight:       Height:       , see ob flowsheet  Assessment/Plan     Encounter Diagnoses   Name Primary?     Gestational Hypertension      HRP (high risk pregnancy), third trimester Yes     Orders Placed This Encounter   Procedures     BPP (Single) w/out NST (In Clinic)       ABO   Date Value Ref Range Status   2017 A  Final     RH(D)   Date Value Ref Range Status   2017 Pos  Final     Antibody Screen   Date Value Ref Range Status   2017 Neg  Final     - Reviewed total weight gain, encouraged continued healthy diet and exercise.    Reviewed importance of daily fetal kick count and why/how to contact provider.    - Reviewed why/how to contact provider if headache/visual changes/RUQ or epigastric pain, decreased fetal movement, vaginal bleeding, leakage of fluid or more than 4 contractions in an hour.     Patient education/orders or  handouts today:  PTL signs/symptoms and Childbirth Ed classes  Twice weekly BPP's to start at next visit, plan repeat labs at that visit too.      Return to clinic in 2 weeks and prn if questions or concerns.     NITHIN Velasquez CNM      Again, thank you for allowing me to participate in the care of your patient.      Sincerely,    NITHIN Velasquez CNM

## 2018-06-01 NOTE — MR AVS SNAPSHOT
After Visit Summary   6/1/2018    Rona Krishnamurthy    MRN: [id number]           Patient Information     Date Of Birth          1980        Visit Information        Provider Department      6/1/2018 1:00 PM Alysha Huerta APRN [facility]        Today's Diagnoses     HRP (high risk pregnancy), third trimester    -  1    Gestational Hypertension           Follow-ups after your visit        Follow-up notes from your care team     Return in about 2 weeks (around 6/15/2018) for LACHO.      Your next 10 appointments already scheduled     Chava 15, 2018 11:00 AM CDT   ULTRASOUND with [facility] ULTRASOUND   [facility] ([facility])    [facility] [address]  [address]  [facility] [id number] [phone]            Chava 15, 2018 11:45 AM CDT   RETURN OB with NITHIN Nicole CNM   [facility] ([facility])    [facility] [address]  [address]  [facility] [address] [phone]            Jun 21, 2018  9:30 AM CDT   (Arrive by 9:15 AM)   Office Visit with Binta Hobbs RN   [facility] ([facility] [place])    [address] [facility]  [address]rd [facility] [address] [phone]           Bring a current list of meds and any records pertaining to this visit. For Physicals, please bring immunization records and any forms needing to be filled out. Please arrive 10 minutes early to complete paperwork.              Future tests that were ordered for you today     Open Future Orders        Priority Expected Expires Ordered    BPP (Single) w/out NST (In Clinic) Routine 6/1/2018 9/29/2018 6/1/2018            Who to contact     Please call your clinic at [phone] to:    Ask questions about your health    Make or cancel appointments    Discuss your medicines    Learn about your test results    Speak to your doctor             "Additional Information About Your Visit        Scroll[employer] Information     [employer] gives you secure access to your electronic health record. If you see a primary care provider, you can also send messages to your care team and make appointments. If you have questions, please call your primary care clinic.  If you do not have a primary care provider, please call [phone] and they will assist you.      [employer] is an electronic gateway that provides easy, online access to your medical records. With [employer], you can request a clinic appointment, read your test results, renew a prescription or communicate with your care team.     To access your existing account, please contact your [facility] or call [phone] for assistance.        Care EveryWhere ID     This is your Care EveryWhere ID. This could be used by other organizations to access your [place] medical records  [id number]        Your Vitals Were     Pulse Height Last Period BMI (Body Mass Index)          102 1.575 m (5' 2\") 10/24/2017 33.65 kg/m2         Blood Pressure from Last 3 Encounters:   06/01/18 148/86   05/18/18 153/75   04/16/18 153/86    Weight from Last 3 Encounters:   06/01/18 83.5 kg (184 lb)   05/18/18 83.5 kg (184 lb 1.6 oz)   04/16/18 78.3 kg (172 lb 11.2 oz)               Primary Care Provider Office Phone # Fax #    Dahlia Paula Noriega [phone]       [address] [place] [address] [address]  [facility] [address]        Equal Access to Services     BERT ACUNA AH: Hadii aad ku hadasho Soomaali, waaxda luqadaha, qaybta kaalmada adeegyada, kim bryan. So [facility] [phone].    ATENCIÓN: Si habla español, tiene a trevizo disposición servicios gratuitos de asistencia lingüística. Llame al [phone].    We comply with applicable federal civil rights laws and [place] laws. We do not discriminate on the basis of race, color, national origin, age, disability, sex, sexual " orientation, or gender identity.            Thank you!     Thank you for choosing [facility]  for your care. Our goal is always to provide you with excellent care. Hearing back from our patients is one way we can continue to improve our services. Please take a few minutes to complete the written survey that you may receive in the mail after your visit with us. Thank you!             Your Updated Medication List - Protect others around you: Learn how to safely use, store and throw away your medicines at www.disposemymeds.org.          This list is accurate as of 6/1/18  1:40 PM.  Always use your most recent med list.                   Brand Name Dispense Instructions for use Diagnosis    acetone (Urine) test Strp     50 each    Test first voiding of the day.    Gestational diabetes       blood glucose monitoring test strip    no brand specified    125 strip    One Touch Verio test strips.  Test 4 times daily.    Gestational diabetes       DiphenhydrAMINE HCl (Sleep) 50 MG Caps      50 mg as needed        L-THEANINE PO      Take 200 mg by mouth daily        prenatal multivitamin plus iron 27-0.8 MG Tabs per tablet      Take 1 tablet by mouth daily

## 2018-06-01 NOTE — MR AVS SNAPSHOT
After Visit Summary   6/1/2018    Rona Krishnamurthy    MRN: [id number]           Patient Information     Date Of Birth          1980        Visit Information        Provider Department      6/1/2018 1:30 PM Ce Cuenca RD [facility], [facility],  Diabetes Education        Today's Diagnoses     Diet controlled gestational diabetes mellitus (GDM) in third trimester    -  1       Follow-ups after your visit        Your next 10 appointments already scheduled     Jun 08, 2018  2:30 PM CDT   [facility]enca [facility]   [facility]    [facility] [address] [address] [phone]           Bring a current list of meds and any records pertaining to this visit. For Physicals, please bring immunization records and any forms needing to be filled out. Please arrive 10 minutes early to complete paperwork.            Chava 15, 2018 11:00 AM CDT   ULTRASOUND with [facility] ULTRASOUND   [facility] ([facility])    [place] [facility] [address]  [address]  [address]  [facility] [address]   [phone]            Chava 15, 2018 11:45 AM CDT   RETURN OB with NITHIN Nicole CNM[facility]I   [facility] ([facility])    [place] [facility] [address]  [address]  [facility] [address]   [phone]            Jun 21, 2018  9:30 AM CDT   (Arrive by 9:15 AM)   Office Visit with Binta Hobbs RN   [facility] ([facility] [place])    [address]  3rd Floor  [facility] [address] [phone]           Bring a current list of meds and any records pertaining to this visit. For Physicals, please bring immunization records and any forms needing to be filled out. Please arrive 10 minutes early to complete paperwork.              Who to contact     If you have questions or need  follow up information about today's clinic visit or your schedule please contact [facility]  [facility] directly at [phone].  Normal or non-critical lab and imaging results will be communicated to you by MyChart, letter or phone within 4 business days after the clinic has received the results. If you do not hear from us within 7 days, please contact the clinic through [employer] or phone. If you have a critical or abnormal lab result, we will notify you by phone as soon as possible.  Submit refill requests through [employer] or call your pharmacy and they will forward the refill request to us. Please allow 3 business days for your refill to be completed.          Additional Information About Your Visit        [employer]Net[employer]y Information     [employer] gives you secure access to your electronic health record. If you see a primary care provider, you can also send messages to your care team and make appointments. If you have questions, please call your primary care clinic.  If you do not have a primary care provider, please call [phone] and they will assist you.        Care EveryWhere ID     This is your Care EveryWhere ID. This could be used by other organizations to access your [place] medical records  [id number]        Your Vitals Were     Last Period                   10/24/2017            Blood Pressure from Last 3 Encounters:   06/01/18 148/86   05/18/18 153/75   04/16/18 153/86    Weight from Last 3 Encounters:   06/01/18 83.5 kg (184 lb)   05/18/18 83.5 kg (184 lb 1.6 oz)   04/16/18 78.3 kg (172 lb 11.2 oz)              We Performed the Following     DIABETES EDUCATION - Individual  []        Primary Care Provider Office Phone # Fax #    Dahlia Noriega [phone]       [address] [place] [address] [address] [place] [address]        Equal Access to Services     BERT ACUNA : Liset Prabhakar, chana reyes, kim talavera  mariann romero ah. So [facility] [phone].    ATENCIÓN: Si habla heraclio, tiene a trevizo disposición servicios gratuitos de asistencia lingüística. Jean-Claude al [phone].    We comply with applicable federal civil rights laws and [place] laws. We do not discriminate on the basis of race, color, national origin, age, disability, sex, sexual orientation, or gender identity.            Thank you!     Thank you for choosing [facility]  DIABETES EDUCATION  for your care. Our goal is always to provide you with excellent care. Hearing back from our patients is one way we can continue to improve our services. Please take a few minutes to complete the written survey that you may receive in the mail after your visit with us. Thank you!             Your Updated Medication List - Protect others around you: Learn how to safely use, store and throw away your medicines at www.disposemymeds.org.          This list is accurate as of 6/1/18 11:59 PM.  Always use your most recent med list.                   Brand Name Dispense Instructions for use Diagnosis    acetone (Urine) test Strp     50 each    Test first voiding of the day.    Gestational diabetes       blood glucose monitoring test strip    no brand specified    125 strip    One Touch Verio test strips.  Test 4 times daily.    Gestational diabetes       DiphenhydrAMINE HCl (Sleep) 50 MG Caps      50 mg as needed        L-THEANINE PO      Take 200 mg by mouth daily        prenatal multivitamin plus iron 27-0.8 MG Tabs per tablet      Take 1 tablet by mouth daily

## 2018-06-04 ENCOUNTER — VIRTUAL VISIT (OUTPATIENT)
Dept: EDUCATION SERVICES | Facility: CLINIC | Age: 38
End: 2018-06-04
Attending: INTERNAL MEDICINE
Payer: COMMERCIAL

## 2018-06-04 DIAGNOSIS — O24.410 DIET CONTROLLED GESTATIONAL DIABETES MELLITUS (GDM) IN THIRD TRIMESTER: Primary | ICD-10-CM

## 2018-06-04 NOTE — MR AVS SNAPSHOT
After Visit Summary   6/4/2018    Rona Krishnamurthy    MRN: [id number]           Patient Information     Date Of Birth          1980        Visit Information        Provider Department      6/4/2018 1:30 PM Ce Cuenca RD [facility] [place],  Diabetes Education        Today's Diagnoses     Diet controlled gestational diabetes mellitus (GDM) in third trimester    -  1       Follow-ups after your visit        Your next 10 appointments already scheduled     Jun 08, 2018  2:30 PM CDT   Office Visit with [facility] Cuenca [facility]   [facility]  [facility] ([facility])    [facility] [address] [address] [phone]           Bring a current list of meds and any records pertaining to this visit. For Physicals, please bring immunization records and any forms needing to be filled out. Please arrive 10 minutes early to complete paperwork.            Chava 15, 2018 11:00 AM CDT   ULTRASOUND with [facility] ULTRASOUND   [facility] ([facility])    [facility] [address]  3rd Fl[address]  [facility] [address]   [phone]            Chava 15, 2018 11:45 AM CDT   RETURN OB with NITHIN Nicole CNMAMI   [facility] ([facility])    [facility] [address]  [address]  [facility] [address]   [phone]            Jun 21, 2018  9:30 AM CDT   (Arrive by 9:15 AM)   Office Visit with Binta Hobbs RN   [facility] Diabetes ([facility] [place])    [address] [facility]  3rd Floor  [facility] [address] [phone]           Bring a current list of meds and any records pertaining to this visit. For Physicals, please bring immunization records and any forms needing to be filled out. Please arrive 10 minutes early to complete paperwork.              Who to contact     If you have questions or need  follow up information about today's clinic visit or your schedule please [facility]  [facility] directly at [phone].  Normal or non-critical lab and imaging results will be communicated to you by MyChart, letter or phone within 4 business days after the clinic has received the results. If you do not hear from us within 7 days, please contact the clinic through Sentrixhart or phone. If you have a critical or abnormal lab result, we will notify you by phone as soon as possible.  Submit refill requests through MustHaveMenus or call your pharmacy and they will forward the refill request to us. Please allow 3 business days for your refill to be completed.          Additional Information About Your Visit        SentrixharQReca! Information     MustHaveMenus gives you secure access to your electronic health record. If you see a primary care provider, you can also send messages to your care team and make appointments. If you have questions, please call your primary care clinic.  If you do not have a primary care provider, please call [phone] and they will assist you.        Care EveryWhere ID     This is your Care EveryWhere ID. This could be used by other organizations to access your [place] medical records  [id number]        Your Vitals Were     Last Period                   10/24/2017            Blood Pressure from Last 3 Encounters:   06/01/18 148/86   05/18/18 153/75   04/16/18 153/86    Weight from Last 3 Encounters:   06/01/18 83.5 kg (184 lb)   05/18/18 83.5 kg (184 lb 1.6 oz)   04/16/18 78.3 kg (172 lb 11.2 oz)              Today, you had the following     No orders found for display       Primary Care Provider Office Phone # Fax #    Dahlia Paula Noriega [phone]       [facility] [address] [place] [address]        Equal Access to Services     BERT ACUNA : Liset Prabhakar, chana reyes, kim talavera  lakaterine bryan. So [facility] [phone].    ATENCIÓN: Si habla heraclio, tiene a trevizo disposición servicios gratuitos de asistencia lingüística. Jean-Claude al [phone].    We comply with applicable federal civil rights laws and [place] laws. We do not discriminate on the basis of race, color, national origin, age, disability, sex, sexual orientation, or gender identity.            Thank you!     Thank you for choosing [facility]  DIABETES EDUCATION  for your care. Our goal is always to provide you with excellent care. Hearing back from our patients is one way we can continue to improve our services. Please take a few minutes to complete the written survey that you may receive in the mail after your visit with us. Thank you!             Your Updated Medication List - Protect others around you: Learn how to safely use, store and throw away your medicines at www.disposemymeds.org.          This list is accurate as of 6/4/18 11:59 PM.  Always use your most recent med list.                   Brand Name Dispense Instructions for use Diagnosis    acetone (Urine) test Strp     50 each    Test first voiding of the day.    Gestational diabetes       blood glucose monitoring test strip    no brand specified    125 strip    One Touch Verio test strips.  Test 4 times daily.    Gestational diabetes       DiphenhydrAMINE HCl (Sleep) 50 MG Caps      50 mg as needed        L-THEANINE PO      Take 200 mg by mouth daily        prenatal multivitamin plus iron 27-0.8 MG Tabs per tablet      Take 1 tablet by mouth daily

## 2018-06-05 NOTE — PROGRESS NOTES
Gestational Diabetes Follow-up    Subjective/Objective:    Rona Krishnamurthy sent in blood glucose log for review. Last date of communication was: June 1st.    Gestational diabetes is being managed with diet and activity    Taking diabetes medications: no.  Patient was instructed on insulin injection technique on June 1st.    Estimated Date of Delivery: Aug 8, 2018    BG/Food Log:   Patient is tracking her diet in my fitness pal. See those records attached to this visit.  Patient is following meal plan. She has made appropriate adjustments to her diet.  Has stopped drinking sugary drinks. Made changes to breakfast.  Ketones have all been negative.   Patient is walking after each meal.     She is now washing her hands prior to testing her glucose.  There have been significant changes in patient's glucose readings since our last visit on June 1st.  Date Fasting glucose One hour after breakfast One hour after lunch  One hour after dinner   6/1    137   6/2 86 106 119 119   6/3 88 143 (ate 51g carb) 129 116   6/4 87 113         Assessment:    Ketones: negatvie.   Significant changes in diet and monitoring technique have resulted in improved glucose control that past four days.     Plan/Response:  Meal Plan Recommendation: 30-45g carbs at breakfast, 45-60g carbs at lunch, 45-60g carbs at supper, 15-60g carbs at each of 3 snacks between meals  Exercise / activity plan: continue walking after meals.  Continue to check BG 4 times daily (fasting and one hour(s) after each meal).  Check ketones every morning until negative for 7 days in a row.    Send your readings and diet log to me again via email on Friday June 8th.  I will be out of the office June 9-15, any questions or concerns at that time should be communicated to Binta Hobbs RN, CDE.  In person follow up with Binta scheduled for June 21st.      Any diabetes medication dose changes were made via the CDE Protocol and Collaborative Practice Agreement with the patient's  referring provider. A copy of this encounter was shared with the provider.

## 2018-06-08 ENCOUNTER — TELEPHONE (OUTPATIENT)
Dept: EDUCATION SERVICES | Facility: CLINIC | Age: 38
End: 2018-06-08

## 2018-06-08 DIAGNOSIS — O24.410 DIET CONTROLLED GESTATIONAL DIABETES MELLITUS (GDM) IN THIRD TRIMESTER: Primary | ICD-10-CM

## 2018-06-08 DIAGNOSIS — O24.419 GESTATIONAL DIABETES: ICD-10-CM

## 2018-06-08 NOTE — TELEPHONE ENCOUNTER
Gestational Diabetes Follow-up    Subjective/Objective:    Rona Krishnamurthy sent in blood glucose log for review. Last date of communication was: 6/4/18.    Gestational diabetes is being managed with diet and activity    Taking diabetes medications: no    Estimated Date of Delivery: Aug 8, 2018    BG/Food Log:   Patient sent her food log. She is following meal plan.   Date Fasting glucose One hour after breakfast One hour after lunch One hour after dinner   6/8 86 120     6/7 86 115 112 146   6/6 84 149 110 107   6/5 88 127 142 119   6/4 87 113 122 169   6/3 88 143 129 116   6/2 86 106 119 119       Assessment:    Ketones:negative.   Fasting blood glucoses: 100% in target.  After breakfast: 71% in target.  After lunch: 83% in target.  After dinner: 67% in target.    Plan/Response:  Meal Plan Recommendation: 30-45g carbs at breakfast, 45-60g carbs at lunch, 45-60g carbs at supper, 15-30g carbs at each of 3 snacks between meals  Exercise / activity plan: walk after meals  Continue to check BG 4 times daily (fasting and one hour(s) after each meal).  Ketone checks to once a week.  Send blood glucose results in one week    Any diabetes medication dose changes were made via the CDE Protocol and Collaborative Practice Agreement with the patient's referring provider. A copy of this encounter was shared with the provider and RN CDE.

## 2018-06-14 ENCOUNTER — MEDICAL CORRESPONDENCE (OUTPATIENT)
Dept: HEALTH INFORMATION MANAGEMENT | Facility: CLINIC | Age: 38
End: 2018-06-14

## 2018-06-15 ENCOUNTER — OFFICE VISIT (OUTPATIENT)
Dept: OBGYN | Facility: CLINIC | Age: 38
End: 2018-06-15
Attending: MIDWIFE
Payer: COMMERCIAL

## 2018-06-15 ENCOUNTER — CARE COORDINATION (OUTPATIENT)
Dept: EDUCATION SERVICES | Facility: CLINIC | Age: 38
End: 2018-06-15

## 2018-06-15 VITALS
HEIGHT: 62 IN | BODY MASS INDEX: 33.64 KG/M2 | SYSTOLIC BLOOD PRESSURE: 135 MMHG | WEIGHT: 182.8 LBS | DIASTOLIC BLOOD PRESSURE: 86 MMHG

## 2018-06-15 DIAGNOSIS — O09.519 HIGH-RISK PREGNANCY, ELDERLY PRIMIGRAVIDA: ICD-10-CM

## 2018-06-15 DIAGNOSIS — O13.3 PREGNANCY-INDUCED HYPERTENSION IN THIRD TRIMESTER: ICD-10-CM

## 2018-06-15 DIAGNOSIS — R03.0 WHITE COAT SYNDROME WITHOUT HYPERTENSION: ICD-10-CM

## 2018-06-15 DIAGNOSIS — O09.519 HIGH-RISK PREGNANCY, ELDERLY PRIMIGRAVIDA: Primary | ICD-10-CM

## 2018-06-15 DIAGNOSIS — O24.410 DIET CONTROLLED GESTATIONAL DIABETES MELLITUS (GDM) IN THIRD TRIMESTER: ICD-10-CM

## 2018-06-15 PROCEDURE — 76816 OB US FOLLOW-UP PER FETUS: CPT | Mod: ZF

## 2018-06-15 PROCEDURE — 76819 FETAL BIOPHYS PROFIL W/O NST: CPT | Mod: ZF

## 2018-06-15 PROCEDURE — G0463 HOSPITAL OUTPT CLINIC VISIT: HCPCS | Mod: 25,ZF

## 2018-06-15 NOTE — MR AVS SNAPSHOT
After Visit Summary   6/15/2018    Rona Krishnamurthy    MRN: [id number]           Patient Information     Date Of Birth          1980        Visit Information        Provider Department      6/15/2018 11:00 AM [facility] ULTRASOUND [facility]        Today's Diagnoses     High-risk pregnancy, elderly primigravida        White coat syndrome without hypertension        Gestational Hypertension           Follow-ups after your visit        Your next 10 appointments already scheduled     Jun 21, 2018  9:30 AM CDT   (Arrive by 9:15 AM)   Office Visit with Binta Hobbs RN   [facility] Diabetes ([facility] [employer])    [address] [facility]  3rd Floor  [facility] [address] [phone]           Bring a current list of meds and any records pertaining to this visit. For Physicals, please bring immunization records and any forms needing to be filled out. Please arrive 10 minutes early to complete paperwork.            Jun 29, 2018 11:45 AM CDT   RETURN OB with Salud Alfaro CNM   [facility] ([facility])    [facility] [address]  3rd Flr,[email or web address] [address]  [address]  [facility] [address] [phone]              Who to contact     Please call your clinic at [phone] to:    Ask questions about your health    Make or cancel appointments    Discuss your medicines    Learn about your test results    Speak to your doctor            Additional Information About Your Visit        MyChart Information     DigiMeldt gives you secure access to your electronic health record. If you see a primary care provider, you can also send messages to your care team and make appointments. If you have questions, please call your primary care clinic.  If you do not have a primary care provider, please call [phone] and they will assist you.      LiveHotSpot is an electronic gateway that provides easy, online access to your  medical records. With Optaros, you can request a clinic appointment, read your test results, renew a prescription or communicate with your care team.     To access your existing account, please contact your [facility] or call [phone] for assistance.        Care EveryWhere ID     This is your Care EveryWhere ID. This could be used by other organizations to access your [place] medical records  [id number]        Your Vitals Were     Last Period                   10/24/2017            Blood Pressure from Last 3 Encounters:   06/15/18 135/86   06/01/18 148/86   05/18/18 153/75    Weight from Last 3 Encounters:   06/15/18 82.9 kg (182 lb 12.8 oz)   06/01/18 83.5 kg (184 lb)   05/18/18 83.5 kg (184 lb 1.6 oz)              We Performed the Following     BPP (Single) w/out NST (In Clinic)     Growth Ultrasound [id number]        Primary Care Provider Office Phone # Fax #    Dahlia Paula Noriega [phone]       [address] [place] [address] [address]  [facility] [address]        Equal Access to Services     JEAN PAUL ACUNA : Hadii mak saucedo Sokaela, waaxda luángel, qaybta kaalmada jann, kim romero . So [facility] [phone].    ATENCIÓN: Si habla español, tiene a trevizo disposición servicios gratuitos de asistencia lingüística. [facility] [phone].    We comply with applicable federal civil rights laws and [place] laws. We do not discriminate on the basis of race, color, national origin, age, disability, sex, sexual orientation, or gender identity.            Thank you!     Thank you for choosing [facility]  for your care. Our goal is always to provide you with excellent care. Hearing back from our patients is one way we can continue to improve our services. Please take a few minutes to complete the written survey that you may receive in the mail after your visit with us. Thank you!             Your Updated Medication List -  Protect others around you: Learn how to safely use, store and throw away your medicines at www.disposemymeds.org.          This list is accurate as of 6/15/18 12:33 PM.  Always use your most recent med list.                   Brand Name Dispense Instructions for use Diagnosis    acetone (Urine) test Strp     50 each    Test first voiding of the day.    Gestational diabetes       blood glucose monitoring test strip    no brand specified    200 strip    One Touch Verio test strips.  Test 6 times daily.    Gestational diabetes       DiphenhydrAMINE HCl (Sleep) 50 MG Caps      50 mg as needed        L-THEANINE PO      Take 200 mg by mouth daily        prenatal multivitamin plus iron 27-0.8 MG Tabs per tablet      Take 1 tablet by mouth daily

## 2018-06-15 NOTE — MR AVS SNAPSHOT
After Visit Summary   6/15/2018    Rona Krishnamurthy    MRN: [id number]           Patient Information     Date Of Birth          1980        Visit Information        Provider Department      6/15/2018 11:45 AM Angela Davis APRN [facility]        Today's Diagnoses     High-risk pregnancy, elderly primigravida - [facility]    -  1    Gestational Hypertension        Diet controlled gestational diabetes mellitus (GDM) in third trimester           Follow-ups after your visit        Follow-up notes from your care team     Return in about 4 days (around 6/19/2018) for LACHO, schedule BPP.      Your next 10 appointments already scheduled     Jun 19, 2018 10:00 AM CDT   ULTRASOUND with [facility] ULTRASOUND II   [facility] ([facility])    [place] [facility] [address]  [address]  [address]  [facility] [address] [phone]            Jun 21, 2018  9:30 AM CDT   (Arrive by 9:15 AM)   Office Visit with Binta Hobbs RN   [facility] Diabetes [facility] [place])    [address] [place] [id number] [phone]           Bring a current list of meds and any records pertaining to this visit. For Physicals, please bring immunization records and any forms needing to be filled out. Please arrive 10 minutes early to complete paperwork.            Jun 22, 2018 11:30 AM CDT   ULTRASOUND with [facility] ULTRASOUND II   [facility] ([facility])    [place] [facility] [address]  [address]  [address]  [facility] [address] [phone]            Jun 29, 2018 11:45 AM CDT   RETURN OB with ANTONIO Juarez   [facility] ([facility])    [place] [facility] [address]  [address]  [address]  [facility] [address] [phone]              Future tests that were ordered for you today     Open  "Standing Orders        Priority Remaining Interval Expires Ordered    BPP (Single) w/out NST (In Clinic) Routine 8/8 twice weekly  10/13/2018 6/15/2018          Open Future Orders        Priority Expected Expires Ordered    ALT Routine 6/19/2018 6/15/2019 6/15/2018    AST Routine 6/19/2018 6/15/2019 6/15/2018    Basic metabolic panel Routine 6/18/2018 6/15/2019 6/15/2018    CBC with platelets Routine 6/19/2018 6/15/2019 6/15/2018    Protein  random urine with Creat Ratio Routine 6/19/2018 6/15/2019 6/15/2018    Uric acid Routine 6/18/2018 6/15/2019 6/15/2018            Who to contact     Please call your clinic at [phone] to:    Ask questions about your health    Make or cancel appointments    Discuss your medicines    Learn about your test results    Speak to your doctor            Additional Information About Your Visit        Comprehensive Care Information     Comprehensive Care gives you secure access to your electronic health record. If you see a primary care provider, you can also send messages to your care team and make appointments. If you have questions, please call your primary care clinic.  If you do not have a primary care provider, please call [phone] and they will assist you.      Comprehensive Care is an electronic gateway that provides easy, online access to your medical records. With Comprehensive Care, you can request a clinic appointment, read your test results, renew a prescription or communicate with your care team.     To access your existing account, please contact your [facility] or call [phone] for assistance.        Care EveryWhere ID     This is your Care EveryWhere ID. This could be used by other organizations to access your [place] medical records  [id number]        Your Vitals Were     Height Last Period BMI (Body Mass Index)             1.575 m (5' 2\") 10/24/2017 33.43 kg/m2          Blood Pressure from Last 3 Encounters:   06/15/18 135/86   06/01/18 148/86   05/18/18 153/75    " Weight from Last 3 Encounters:   06/15/18 82.9 kg (182 lb 12.8 oz)   06/01/18 83.5 kg (184 lb)   05/18/18 83.5 kg (184 lb 1.6 oz)               Primary Care Provider Office Phone # Fax #    Dahlia Paula Bradley Noriega [phone]       [address] [place] [address] [address]  [facility] [address]        Equal Access to Services     BERT ACUNA : Hadii aad ku hadasho Soomaali, waaxda luqadaha, qaybta kaalmada adeegyada, waxay idiin hayaan adeeg roshnitristanlorie bryan. So [facility] [phone].    ATENCIÓN: Si chirag pulliam, tiene a trevizo disposición servicios gratuitos de asistencia lingüística. [facility] [phone].    We comply with applicable federal civil rights laws and [place] laws. We do not discriminate on the basis of race, color, national origin, age, disability, sex, sexual orientation, or gender identity.            Thank you!     Thank you for choosing [facility]  for your care. Our goal is always to provide you with excellent care. Hearing back from our patients is one way we can continue to improve our services. Please take a few minutes to complete the written survey that you may receive in the mail after your visit with us. Thank you!             Your Updated Medication List - Protect others around you: Learn how to safely use, store and throw away your medicines at www.disposemymeds.org.          This list is accurate as of 6/15/18  4:43 PM.  Always use your most recent med list.                   Brand Name Dispense Instructions for use Diagnosis    acetone (Urine) test Strp     50 each    Test first voiding of the day.    Gestational diabetes       blood glucose monitoring test strip    no brand specified    200 strip    One Touch Verio test strips.  Test 6 times daily.    Gestational diabetes       DiphenhydrAMINE HCl (Sleep) 50 MG Caps      50 mg as needed        L-THEANINE PO      Take 200 mg by mouth daily        prenatal multivitamin plus iron 27-0.8 MG Tabs per tablet       Take 1 tablet by mouth daily

## 2018-06-15 NOTE — PROGRESS NOTES
37 year old,  , presents at 32 2/7 weeks in pregnancy complicated by GDM, hypertension for biophysical and growth assessment.    Fetal Breathing Movements (FBM): Normal - 2  Gross Body Movements (GBM): Normal - 2  Fetal Tone (FT): Normal - 2  AFV: Pocket of amniotic fluid > or = to 2 cm x 2 cm - 2  Quantitative Amniotic Fluid Volume Total: 18.5 cm      BPP 8/8.  FHR = 144bpm Normal.   MCA Not done.  NST Not done.       USEGA = 33 5/7 weeks.  EFW = 2,151 grams, 69 % for 32 weeks.      Single fetus in cephalic presentation.  Placenta anterior and grade 1.    Recommend ongoing weekly BPP assessment with repeat growth in 4 weeks    REGGIE Gallardo MD

## 2018-06-15 NOTE — PROGRESS NOTES
"Subjective:      37 year old  at 32w2d presentst for a routine prenatal appointment.    no vaginal bleeding or leakage of fluid.  occ  Contractions denies Sx of PTL . Good  fetal movement.       No HA, visual changes, RUQ or epigastric pain.   Patient concerns: pt brings BS record  Over 2 weeks no elevated FBS all less than 95   1 hr PP approx 6 > 140  Range 142-150  Pt has faxed these to diabetes center for review today   Pt brings home BPs recorded  Almost daily  Range  119/71- 134/89  None at home are > 140/90 or meet criteria for severe range.    Denies H/A, blurred vision epigastric pain.  Feeling overall ok   Concerned about mention of IOL at 37 wks    Will continue to monitor BP, GDM.   Had normal BPP today nl BENEDICTO, growth US 69%  Placenta ant grade 1   Discussed evaluating R/B with recommendations and complications of pregnancy including AMA, GDM, HTN earlier maturation  Of placenta potential for placental insufficiency and safety and growth OF baby.  Continue discussion at next visit    BP checked per protocol  None >140/90    Objective:  Vitals:    06/15/18 1132 06/15/18 1220 06/15/18 1221 06/15/18 1222   BP: 135/88 131/86 139/84 135/86   Weight: 82.9 kg (182 lb 12.8 oz)      Height: 1.575 m (5' 2\")      , see ob flowsheet  Assessment/Plan     Encounter Diagnoses   Name Primary?     High-risk pregnancy, elderly primigravida - [facility] CNM Yes     Gestational Hypertension      Diet controlled gestational diabetes mellitus (GDM) in third trimester      Orders Placed This Encounter   Procedures     BPP (Single) w/out NST (In Clinic)     ALT     AST     Basic metabolic panel     CBC with platelets     Protein  random urine with Creat Ratio     Uric acid       ABO   Date Value Ref Range Status   2017 A  Final     RH(D)   Date Value Ref Range Status   2017 Pos  Final     Antibody Screen   Date Value Ref Range Status   2017 Neg  Final   , Rhogam  was notgiven.  Pt had TDAP last visit     - " Reviewed total weight gain, encouraged continued healthy diet and exercise.  .  Reviewed importance of daily fetal kick count and why/how to contact provider.    - Reviewed why/how to contact provider if headache/visual changes/RUQ or epigastric pain, decreased fetal movement, vaginal bleeding, leakage of fluid or more than 4 contractions in an hour.     Patient education/orders or handouts today:  PTL signs/symptoms  Return to clinic in  Early next  week and prn if questions or concerns.   Addendum  Noted late plan to start labs at 32 wks and twice weekly BPPs per MD recommendation   Pt contacted will schedule starting early next week and labs are ordered.      NITHIN NicoleM

## 2018-06-15 NOTE — PROGRESS NOTES
Diabetes Educator Note:    Tiffanie sends her weekly blood glucose numbers in.  She kept notes while she was logging blood glucose.   She is currently managing with diet and exercise.  Walking after every meal.   All outliers in this report are related to not walking after eating.    She is trying hard to follow her meal plan.    No ketones in urine, except for one day, which was trace.  She attributes this to being a little dehydrated.  All values are one hour post prandial       Time Fasting PC BREAKFAST PC LUNCH PC DINNER   BG Range 81-90 119-143   123 -163     The high numbers seen post prandially ( 6 out of 20 post prandial readings) all occurred when she had not walked after eating.  States that she understands the importance of making sure she does some sort of activity after eating.      No changes for now.  She may need some rapid acting insulin at mealtimes.  For now, would not recommend any changes.   She agrees to send numbers next week.   Encouraged to remain active and continue to follow her meal plan.      ANGELA Jackson, RN, CDE  Diabetes   [facility]  [facility] [address], [place] [address]  Email:  [email or web address]  Phone:  [phone]

## 2018-06-15 NOTE — LETTER
"6/15/2018       RE: Rona Krishnamurthy  [address] [facility] [address]     Dear Colleague,    Thank you for referring your patient, Rona Krishnamurthy, to the [facility] at [facility]. Please see a copy of my visit note below.    Subjective:      37 year old  at 32w2d presentst for a routine prenatal appointment.    no vaginal bleeding or leakage of fluid.  occ  Contractions denies Sx of PTL . Good  fetal movement.       No HA, visual changes, RUQ or epigastric pain.   Patient concerns: pt brings BS record  Over 2 weeks no elevated FBS all less than 95   1 hr PP approx 6 > 140  Range 142-150  Pt has faxed these to diabetes center for review today   Pt brings home BPs recorded  Almost daily  Range  119/71- 134/89  None at home are > 140/90 or meet criteria for severe range.    Denies H/A, blurred vision epigastric pain.  Feeling overall ok   Concerned about mention of IOL at 37 wks    Will continue to monitor BP, GDM.   Had normal BPP today nl BENEDICTO, growth US 69%  Placenta ant grade 1   Discussed evaluating R/B with recommendations and complications of pregnancy including AMA, GDM, HTN earlier maturation  Of placenta potential for placental insufficiency and safety and growth OF baby.  Continue discussion at next visit    BP checked per protocol  None >140/90    Objective:  Vitals:    06/15/18 1132 06/15/18 1220 06/15/18 1221 06/15/18 1222   BP: 135/88 131/86 139/84 135/86   Weight: 82.9 kg (182 lb 12.8 oz)      Height: 1.575 m (5' 2\")      , see ob flowsheet  Assessment/Plan     Encounter Diagnoses   Name Primary?     High-risk pregnancy, elderly primigravida - [facility] CNM Yes     Gestational Hypertension      Diet controlled gestational diabetes mellitus (GDM) in third trimester      Orders Placed This Encounter   Procedures     BPP (Single) w/out NST (In Clinic)     ALT     AST     Basic metabolic panel     CBC with platelets     Protein  " random urine with Creat Ratio     Uric acid       ABO   Date Value Ref Range Status   12/21/2017 A  Final     RH(D)   Date Value Ref Range Status   12/21/2017 Pos  Final     Antibody Screen   Date Value Ref Range Status   12/21/2017 Neg  Final   , Rhogam  was notgiven.  Pt had TDAP last visit     - Reviewed total weight gain, encouraged continued healthy diet and exercise.  .  Reviewed importance of daily fetal kick count and why/how to contact provider.    - Reviewed why/how to contact provider if headache/visual changes/RUQ or epigastric pain, decreased fetal movement, vaginal bleeding, leakage of fluid or more than 4 contractions in an hour.     Patient education/orders or handouts today:  PTL signs/symptoms  Return to clinic in  Early next  week and prn if questions or concerns.   Addendum  Noted late plan to start labs at 32 wks and twice weekly BPPs per MD recommendation   Pt contacted will schedule starting early next week and labs are ordered.      NITHIN Nicole CNM

## 2018-06-15 NOTE — LETTER
6/15/2018       RE: Rona Krishnamurthy  [address] [facility] [address]     Dear Colleague,    Thank you for referring your patient, Rona Krishnamurthy, to the [facility] at [facility]. Please see a copy of my visit note below.    37 year old,  , presents at 32 2/7 weeks in pregnancy complicated by GDM, hypertension for biophysical and growth assessment.    Fetal Breathing Movements (FBM): Normal - 2  Gross Body Movements (GBM): Normal - 2  Fetal Tone (FT): Normal - 2  AFV: Pocket of amniotic fluid > or = to 2 cm x 2 cm - 2  Quantitative Amniotic Fluid Volume Total: 18.5 cm      BPP 8/8.  FHR = 144bpm Normal.   MCA Not done.  NST Not done.       USEGA = 33 5/7 weeks.  EFW = 2,151 grams, 69 % for 32 weeks.      Single fetus in cephalic presentation.  Placenta anterior and grade 1.    Recommend ongoing weekly BPP assessment with repeat growth in 4 weeks    REGGIE Gallardo MD      Again, thank you for allowing me to participate in the care of your patient.      Sincerely,    [facility]

## 2018-06-19 ENCOUNTER — OFFICE VISIT (OUTPATIENT)
Dept: OBGYN | Facility: CLINIC | Age: 38
End: 2018-06-19
Attending: MIDWIFE
Payer: COMMERCIAL

## 2018-06-19 DIAGNOSIS — O13.3 PREGNANCY-INDUCED HYPERTENSION IN THIRD TRIMESTER: ICD-10-CM

## 2018-06-19 DIAGNOSIS — O09.519 HIGH-RISK PREGNANCY, ELDERLY PRIMIGRAVIDA: ICD-10-CM

## 2018-06-19 PROCEDURE — 76819 FETAL BIOPHYS PROFIL W/O NST: CPT | Mod: ZF

## 2018-06-19 NOTE — LETTER
2018       RE: Rona Krishnamurthy  [address] [facility] [address]     Dear Colleague,    Thank you for referring your patient, Rona Krishnamurthy, to the [facility] at [facility]. Please see a copy of my visit note below.    37 year old,  , presents at 32 6/7 weeks in pregnancy complicated by GDM, hypertension  for biophysical assessment.    Fetal Breathing Movements (FBM): Normal - 2  Gross Body Movements (GBM): Normal - 2  Fetal Tone (FT): Normal - 2  AFV: Pocket of amniotic fluid > or = to 2 cm x 2 cm - 2  Quantitative Amniotic Fluid Volume Total: 14.3 cm      BPP 8/8.  FHR = 146bpm Normal.   MCA Not done.  NST Not done.     Single fetus in cephalic presentation.  Placenta anterior and grade 1.    Recommend weekly assessment.    Suyapa Lomas, REGGIE Leslie MD    Again, thank you for allowing me to participate in the care of your patient.      Sincerely,    [id number]

## 2018-06-19 NOTE — MR AVS SNAPSHOT
After Visit Summary   6/19/2018    Rona Krishnamurthy    MRN: [id number]           Patient Information     Date Of Birth          1980        Visit Information        Provider Department      6/19/2018 10:00 AM [facility] ULTRASOUND II [facility]        Today's Diagnoses     High-risk pregnancy, elderly primigravida - [facility] CN        Gestational Hypertension           Follow-ups after your visit        Your next 10 appointments already scheduled     Jun 21, 2018  9:30 AM CDT   (Arrive by 9:15 AM)   Office Visit with Binta Hobbs RN   [facility] ([facility] [place])    [address]  3rd Floor  [facility] [id number] [phone]           Bring a current list of meds and any records pertaining to this visit. For Physicals, please bring immunization records and any forms needing to be filled out. Please arrive 10 minutes early to complete paperwork.            Jun 22, 2018 11:30 AM CDT   ULTRASOUND with [facility] ULTRASOUND II   [facility] ([facility])    [facility] [address]  [address]  [facility] [address] [phone]            Jun 29, 2018 11:45 AM CDT   RETURN OB with Salud Alfaro CNM   [facility] ([facility])    [facility] [address]  [address] [facility] [facility] [address] [phone]              Who to contact     Please call your clinic at [phone] to:    Ask questions about your health    Make or cancel appointments    Discuss your medicines    Learn about your test results    Speak to your doctor            Additional Information About Your Visit        MyChart Information     [employer] gives you secure access to your electronic health record. If you see a primary care provider, you can also send messages to your care team and make appointments. If you have questions, please call  your primary care clinic.  If you do not have a primary care provider, please call [phone] and they will assist you.      [employer] is an electronic gateway that provides easy, online access to your medical records. With [employer], you can request a clinic appointment, read your test results, renew a prescription or communicate with your care team.     To access your existing account, please contact your [facility] or call [phone] for assistance.        Care EveryWhere ID     This is your Care EveryWhere ID. This could be used by other organizations to access your [employer] medical records  [id number]        Your Vitals Were     Last Period                   10/24/2017            Blood Pressure from Last 3 Encounters:   06/15/18 135/86   06/01/18 148/86   05/18/18 153/75    Weight from Last 3 Encounters:   06/15/18 82.9 kg (182 lb 12.8 oz)   06/01/18 83.5 kg (184 lb)   05/18/18 83.5 kg (184 lb 1.6 oz)              We Performed the Following     BPP (Single) w/out NST (In Clinic)        Primary Care Provider Office Phone # Fax #    Dahlia Paula Noriega [phone]       [address] [place] [address]        Equal Access to Services     BERT ACUNA : Hadii aad ku hadasho Soomaali, waaxda luqadaha, qaybta kaalmada adeegyada, kim vaughanin haypatrician simón romero . So [facility] [phone].    ATENCIÓN: Si habla español, tiene a trevizo disposición servicios gratuitos de asistencia lingüística. Llame al [phone].    We comply with applicable federal civil rights laws and [place] laws. We do not discriminate on the basis of race, color, national origin, age, disability, sex, sexual orientation, or gender identity.            Thank you!     Thank you for choosing [facility]  for your care. Our goal is always to provide you with excellent care. Hearing back from our patients is one way we can continue to improve our  services. Please take a few minutes to complete the written survey that you may receive in the mail after your visit with us. Thank you!             Your Updated Medication List - Protect others around you: Learn how to safely use, store and throw away your medicines at www.disposemymeds.org.          This list is accurate as of 6/19/18  2:46 PM.  Always use your most recent med list.                   Brand Name Dispense Instructions for use Diagnosis    acetone (Urine) test Strp     50 each    Test first voiding of the day.    Gestational diabetes       blood glucose monitoring test strip    no brand specified    200 strip    One Touch Verio test strips.  Test 6 times daily.    Gestational diabetes       DiphenhydrAMINE HCl (Sleep) 50 MG Caps      50 mg as needed        L-THEANINE PO      Take 200 mg by mouth daily        prenatal multivitamin plus iron 27-0.8 MG Tabs per tablet      Take 1 tablet by mouth daily

## 2018-06-19 NOTE — PROGRESS NOTES
37 year old,  , presents at 32 6/7 weeks in pregnancy complicated by GDM, hypertension  for biophysical assessment.    Fetal Breathing Movements (FBM): Normal - 2  Gross Body Movements (GBM): Normal - 2  Fetal Tone (FT): Normal - 2  AFV: Pocket of amniotic fluid > or = to 2 cm x 2 cm - 2  Quantitative Amniotic Fluid Volume Total: 14.3 cm      BPP 8/8.  FHR = 146bpm Normal.   MCA Not done.  NST Not done.     Single fetus in cephalic presentation.  Placenta anterior and grade 1.    Recommend weekly assessment.    REGGIE Chan MD

## 2018-06-21 ENCOUNTER — OFFICE VISIT (OUTPATIENT)
Dept: EDUCATION SERVICES | Facility: CLINIC | Age: 38
End: 2018-06-21
Payer: COMMERCIAL

## 2018-06-21 DIAGNOSIS — O24.410 DIET CONTROLLED GESTATIONAL DIABETES MELLITUS (GDM) IN THIRD TRIMESTER: Primary | ICD-10-CM

## 2018-06-21 NOTE — PROGRESS NOTES
DIABETES EDUCATION NOTE:    Rona Krishnamurthy presents today for education related to Gestational diabetes (GDM)    Patient is being treated with:  diet and exercise  She is accompanied by self    PATIENT CONCERNS RELATED TO DIABETES SELF MANAGEMENT:     Doing well.  Looking forward to delivery.   Is still managing to get on the treadmill after each meal to walk for about 20-25 minutes.  She notices her blood glucoses are higher if she doesn't do this.      ASSESSMENT:  Current Diabetes Management per Patient:    Monitoring  Patient glucose self monitoring as follows: four times daily.   BG meter: One Touch Verio Flex meter  BG results:     Fasting PC BKFST PC LUNCH PC DINNER   81-90 119-143        Although she is over target at some times after meals, the number of times that she was over 140 at one hour was about 10% of the time.  Generally when she rechecked it at two hours, it was back within target range.      EDUCATION and INSTRUCTION PROVIDED AT THIS VISIT:       Overall she is doing well.  Weight since diagnosis of GDM has not changed, however ketones are negative.    She is walking after each meal, and is motivated to continue doing this as she can see immediately the change in blood glucose if she doesn't.    Discussed the possibility that she may need some rapid acting insulin at meals if we start to see a consistent pattern of elevated blood glucoses after meals.  She is trying hard to avoid taking insulin, but states that she will do whatever is required to manage diabetes.  It's possible, since most of her problem is post meal, she may be able to take glyburide instead of insulin.  She will continue to walk after meals.  She notices that when she eats fast food, that her blood glucoses tend to be higher afterward.  She is trying not to consume as much fast food as she previously had done, and is trying to snack on healthier foods.      Discussed management when in the hospital,  Frequent BG  checks, monitoring of baby, and need to monitor BG post-partum, life long risk of diabetes for both her and baby.  Encouraged to maintain a healthy weight, get regular exercise and make healthy food choices for both herself and her baby.    She is planning to breastfeed.  Given positive reinforcement for this.      Patient-stated goal written and given to Rona Krishnamurthy.  Verbalized and demonstrated understanding of instructions.     PLAN:  See patient instructions  AVS printed and given to patient    FOLLOW-UP:      Weekly BG logs.  If insulin is needed we will schedule a follow up appointment to teach.      Time spent with patient at today's visit was 30 minutes.      Any diabetes medication dose changes were made via the CDE Protocol and Collaborative Practice Agreement with [place] and [facility].  A copy of this encounter was provided to patient's referring provider.

## 2018-06-21 NOTE — MR AVS SNAPSHOT
After Visit Summary   6/21/2018    Rona Krishnamurthy    MRN: [id number]           Patient Information     Date Of Birth          1980        Visit Information        Provider Department      6/21/2018 9:30 AM Binta Hobbs RN [facility]        Today's Diagnoses     Diet controlled gestational diabetes mellitus (GDM) in third trimester    -  1       Follow-ups after your visit        Your next 10 appointments already scheduled     Jun 22, 2018 11:30 AM CDT   [facility]   [facility] ([facility])    [facility]  [address]  [facility] [address] [phone]            Jun 29, 2018 11:45 AM CDT   RETURN OB with Salud Alfaro CNM   [facility] ([facility])    [facility] [address]  [address]  [facility] [address] [phone]              Who to contact     Please call your clinic at [phone] to:    Ask questions about your health    Make or cancel appointments    Discuss your medicines    Learn about your test results    Speak to your doctor            Additional Information About Your Visit        [employer] Information     [employer] gives you secure access to your electronic health record. If you see a primary care provider, you can also send messages to your care team and make appointments. If you have questions, please call your primary care clinic.  If you do not have a primary care provider, please call [phone] and they will assist you.      [employer] is an electronic gateway that provides easy, online access to your medical records. With [employer], you can request a clinic appointment, read your test results, renew a prescription or communicate with your care team.     To access your existing account, please contact your [facility] or call [phone] for  assistance.        Care EveryWhere ID     This is your Care EveryWhere ID. This could be used by other organizations to access your [place] medical records  [id number]        Your Vitals Were     Last Period                   10/24/2017            Blood Pressure from Last 3 Encounters:   06/15/18 135/86   06/01/18 148/86   05/18/18 153/75    Weight from Last 3 Encounters:   06/15/18 82.9 kg (182 lb 12.8 oz)   06/01/18 83.5 kg (184 lb)   05/18/18 83.5 kg (184 lb 1.6 oz)              Today, you had the following     No orders found for display       Primary Care Provider Office Phone # Fax #    Dahlia Paula Noriega [phone]       [address] [place] [address] [address] [place] [address]        Equal Access to Services     JEAN PAUL ACUNA : Hadii aad ku hadasho Sokaela, waaxda luqadaha, qaybta kaalmada ademelvin, kim romero . So [facility] [phone].    ATENCIÓN: Si habla español, tiene a trevizo disposición servicios gratuitos de asistencia lingüística. Jean-Claude al [phone].    We comply with applicable federal civil rights laws and [place] laws. We do not discriminate on the basis of race, color, national origin, age, disability, sex, sexual orientation, or gender identity.            Thank you!     Thank you for choosing [facility]  for your care. Our goal is always to provide you with excellent care. Hearing back from our patients is one way we can continue to improve our services. Please take a few minutes to complete the written survey that you may receive in the mail after your visit with us. Thank you!             Your Updated Medication List - Protect others around you: Learn how to safely use, store and throw away your medicines at www.disposemymeds.org.          This list is accurate as of 6/21/18  5:55 PM.  Always use your most recent med list.                   Brand Name Dispense Instructions for use Diagnosis    acetone (Urine) test Strp     50 each     Test first voiding of the day.    Gestational diabetes       blood glucose monitoring test strip    no brand specified    200 strip    One Touch Verio test strips.  Test 6 times daily.    Gestational diabetes       DiphenhydrAMINE HCl (Sleep) 50 MG Caps      50 mg as needed        L-THEANINE PO      Take 200 mg by mouth daily        prenatal multivitamin plus iron 27-0.8 MG Tabs per tablet      Take 1 tablet by mouth daily

## 2018-06-22 ENCOUNTER — OFFICE VISIT (OUTPATIENT)
Dept: OBGYN | Facility: CLINIC | Age: 38
End: 2018-06-22
Attending: MIDWIFE
Payer: COMMERCIAL

## 2018-06-22 DIAGNOSIS — O09.519 HIGH-RISK PREGNANCY, ELDERLY PRIMIGRAVIDA: ICD-10-CM

## 2018-06-22 DIAGNOSIS — O13.3 PREGNANCY-INDUCED HYPERTENSION IN THIRD TRIMESTER: ICD-10-CM

## 2018-06-22 PROCEDURE — 76819 FETAL BIOPHYS PROFIL W/O NST: CPT | Mod: ZF

## 2018-06-22 NOTE — LETTER
2018       RE: Rona Krishnamurthy  [address] [facility] [address]     Dear Colleague,    Thank you for referring your patient, Rona Krishnamurthy, to the [facility] at [facility]. Please see a copy of my visit note below.    37 year old,  , presents at 33 2/7 weeks in pregnancy complicated by GDM, hypertension for biophysical assessment.    Fetal Breathing Movements (FBM): Normal - 2  Gross Body Movements (GBM): Normal - 2  Fetal Tone (FT): Normal - 2  AFV: Pocket of amniotic fluid > or = to 2 cm x 2 cm - 2  Quantitative Amniotic Fluid Volume Total: 18.8 cm      BPP 8/8.  FHR = 140bpm Normal.   MCA Not done.  NST Not done.     Single fetus in cephalic presentation.  Placenta anterior and grade 2.    Recommend continued twice weekly assessments.    REGGIE Gallardo MD, FACOG  [facility]  OB/GYN    2018  5:05 PM          Again, thank you for allowing me to participate in the care of your patient.      Sincerely,    [id number]

## 2018-06-22 NOTE — PROGRESS NOTES
37 year old,  , presents at 33 2/7 weeks in pregnancy complicated by GDM, hypertension for biophysical assessment.    Fetal Breathing Movements (FBM): Normal - 2  Gross Body Movements (GBM): Normal - 2  Fetal Tone (FT): Normal - 2  AFV: Pocket of amniotic fluid > or = to 2 cm x 2 cm - 2  Quantitative Amniotic Fluid Volume Total: 18.8 cm      BPP 8/8.  FHR = 140bpm Normal.   MCA Not done.  NST Not done.     Single fetus in cephalic presentation.  Placenta anterior and grade 2.    Recommend continued twice weekly assessments.    REGGIE Gallardo MD, FACOG  [facility]  OB/GYN    2018  5:05 PM

## 2018-06-26 ENCOUNTER — OFFICE VISIT (OUTPATIENT)
Dept: OBGYN | Facility: CLINIC | Age: 38
End: 2018-06-26
Attending: ADVANCED PRACTICE MIDWIFE
Payer: COMMERCIAL

## 2018-06-26 DIAGNOSIS — O13.3 PREGNANCY-INDUCED HYPERTENSION IN THIRD TRIMESTER: ICD-10-CM

## 2018-06-26 DIAGNOSIS — O09.519 HIGH-RISK PREGNANCY, ELDERLY PRIMIGRAVIDA: ICD-10-CM

## 2018-06-26 DIAGNOSIS — R03.0 ELEVATED BLOOD PRESSURE READING WITHOUT DIAGNOSIS OF HYPERTENSION: ICD-10-CM

## 2018-06-26 LAB
ALT SERPL W P-5'-P-CCNC: 21 U/L (ref 0–50)
ANION GAP SERPL CALCULATED.3IONS-SCNC: 9 MMOL/L (ref 3–14)
AST SERPL W P-5'-P-CCNC: 19 U/L (ref 0–45)
BUN SERPL-MCNC: 9 MG/DL (ref 7–30)
CALCIUM SERPL-MCNC: 8.6 MG/DL (ref 8.5–10.1)
CHLORIDE SERPL-SCNC: 108 MMOL/L (ref 94–109)
CO2 SERPL-SCNC: 23 MMOL/L (ref 20–32)
CREAT SERPL-MCNC: 0.55 MG/DL (ref 0.52–1.04)
CREAT UR-MCNC: 26 MG/DL
ERYTHROCYTE [DISTWIDTH] IN BLOOD BY AUTOMATED COUNT: 13.4 % (ref 10–15)
GFR SERPL CREATININE-BSD FRML MDRD: >90 ML/MIN/1.7M2
GLUCOSE SERPL-MCNC: 90 MG/DL (ref 70–99)
HCT VFR BLD AUTO: 34.5 % (ref 35–47)
HGB BLD-MCNC: 11.3 G/DL (ref 11.7–15.7)
MCH RBC QN AUTO: 28.7 PG (ref 26.5–33)
MCHC RBC AUTO-ENTMCNC: 32.8 G/DL (ref 31.5–36.5)
MCV RBC AUTO: 88 FL (ref 78–100)
PLATELET # BLD AUTO: 224 10E9/L (ref 150–450)
POTASSIUM SERPL-SCNC: 3.8 MMOL/L (ref 3.4–5.3)
PROT UR-MCNC: <0.05 G/L
PROT/CREAT 24H UR: NORMAL G/G CR (ref 0–0.2)
RBC # BLD AUTO: 3.94 10E12/L (ref 3.8–5.2)
SODIUM SERPL-SCNC: 140 MMOL/L (ref 133–144)
URATE SERPL-MCNC: 3.8 MG/DL (ref 2.6–6)
WBC # BLD AUTO: 9 10E9/L (ref 4–11)

## 2018-06-26 PROCEDURE — G0463 HOSPITAL OUTPT CLINIC VISIT: HCPCS

## 2018-06-26 PROCEDURE — 84460 ALANINE AMINO (ALT) (SGPT): CPT | Performed by: STUDENT IN AN ORGANIZED HEALTH CARE EDUCATION/TRAINING PROGRAM

## 2018-06-26 PROCEDURE — 84450 TRANSFERASE (AST) (SGOT): CPT | Performed by: STUDENT IN AN ORGANIZED HEALTH CARE EDUCATION/TRAINING PROGRAM

## 2018-06-26 PROCEDURE — 76819 FETAL BIOPHYS PROFIL W/O NST: CPT | Mod: ZF

## 2018-06-26 PROCEDURE — 84550 ASSAY OF BLOOD/URIC ACID: CPT | Performed by: STUDENT IN AN ORGANIZED HEALTH CARE EDUCATION/TRAINING PROGRAM

## 2018-06-26 PROCEDURE — 85027 COMPLETE CBC AUTOMATED: CPT | Performed by: STUDENT IN AN ORGANIZED HEALTH CARE EDUCATION/TRAINING PROGRAM

## 2018-06-26 PROCEDURE — 36415 COLL VENOUS BLD VENIPUNCTURE: CPT | Performed by: STUDENT IN AN ORGANIZED HEALTH CARE EDUCATION/TRAINING PROGRAM

## 2018-06-26 PROCEDURE — 84156 ASSAY OF PROTEIN URINE: CPT | Performed by: MIDWIFE

## 2018-06-26 PROCEDURE — 80048 BASIC METABOLIC PNL TOTAL CA: CPT | Performed by: STUDENT IN AN ORGANIZED HEALTH CARE EDUCATION/TRAINING PROGRAM

## 2018-06-26 NOTE — LETTER
2018       RE: Rona Krishnamurthy  [address] [facility] [address]     Dear Colleague,    Thank you for referring your patient, Rona Krishnamurthy, to the [facility] at [facility]. Please see a copy of my visit note below.    37 year old,  , presents at 33 6/7 weeks in pregnancy complicated by GDM, hypertension for biophysical assessment.    Fetal Breathing Movements (FBM): Normal - 2  Gross Body Movements (GBM): Normal - 2  Fetal Tone (FT): Normal - 2  AFV: Pocket of amniotic fluid > or = to 2 cm x 2 cm - 2  Quantitative Amniotic Fluid Volume Total: 17.4 cm      BPP 8/8.  FHR = 142bpm Normal.   MCA Not done.  NST Not done.     Singel fetus in cephalic presentation.  Placenta anterior and grade 2.    Recommend weekly assessment.    REGGIE Gallardo MD    Again, thank you for allowing me to participate in the care of your patient.      Sincerely,    [id number]

## 2018-06-26 NOTE — MR AVS SNAPSHOT
After Visit Summary   6/26/2018    Rona Krishnamurthy    MRN: [id number]           Patient Information     Date Of Birth          1980        Visit Information        Provider Department      6/26/2018 10:00 AM [facility] ULTRASOUND II [facility]        Today's Diagnoses     High-risk pregnancy, elderly primigravida - [facility]        Gestational Hypertension           Follow-ups after your visit        Your next 10 appointments already scheduled     Jun 29, 2018 10:30 AM CDT   US FETAL BIOPHYS PROFILE W/O NON STRESS TEST with URUS1   [facility], Ultrasound ([facility])    [address] [facility] [facility] [address] [phone]           Please bring a list of your medicines (including vitamins, minerals and over-the-counter drugs). Also, tell your doctor about any allergies you may have. Wear comfortable clothes and leave your valuables at home.  If you re less than 20 weeks drink four 8-ounce glasses of fluid an hour before your exam. If you need to empty your bladder before your exam, try to release only a little urine. Then, drink another glass of fluid.  You may have up to two family members in the exam room. If you bring a small child, an adult must be there to care for him or her.  Please call the Imaging Department at your exam site with any questions.            Jun 29, 2018 11:45 AM CDT   RETURN OB with Salud Alfaro CNM   [facility] ([facility])    [facility] [address]  [address]  [facility] [address]   [phone]            Jul 03, 2018 11:00 AM CDT   ULTRASOUND with [facility] ULTRASOUND II   [facility] ([facility])    [facility] [address]  [address]  [facility] [address]   [phone]            Jul 06, 2018 11:00 AM CDT   ULTRASOUND with [facility] ULTRASOUND    [facility] ([facility])    [facility] [address]  [address]  [facility] [address] [phone]              Who to contact     Please call your clinic at [phone] to:    Ask questions about your health    Make or cancel appointments    Discuss your medicines    Learn about your test results    Speak to your doctor            Additional Information About Your Visit        [employer] Information     [employer] gives you secure access to your electronic health record. If you see a primary care provider, you can also send messages to your care team and make appointments. If you have questions, please call your primary care clinic.  If you do not have a primary care provider, please call [phone] and they will assist you.      [employer] is an electronic gateway that provides easy, online access to your medical records. With [employer], you can request a clinic appointment, read your test results, renew a prescription or communicate with your care team.     To access your existing account, please contact your [facility] or call [phone] for assistance.        Care EveryWhere ID     This is your Care EveryWhere ID. This could be used by other organizations to access your [facility] medical records  [id number]        Your Vitals Were     Last Period                   10/24/2017            Blood Pressure from Last 3 Encounters:   06/15/18 135/86   06/01/18 148/86   05/18/18 153/75    Weight from Last 3 Encounters:   06/15/18 82.9 kg (182 lb 12.8 oz)   06/01/18 83.5 kg (184 lb)   05/18/18 83.5 kg (184 lb 1.6 oz)              We Performed the Following     BPP (Single) w/out NST (In Clinic)        Primary Care Provider Office Phone # Fax #    Dahlia Noriega [phone]       [address] [facility] [address]  [facility] [address]        Equal Access to Services     BERT ACUNA AH: Liset saucedo  Vanna, wajamilda lusonaludin, rota kakristian forte, kim gr roshnilucas sebastiankiran randi. So [facility] [phone].    ATENCIÓN: Si chirag pulliam, tiene a trevizo disposición servicios gratuitos de asistencia lingüística. Jean-Claude al [phone].    We comply with applicable federal civil rights laws and [place] laws. We do not discriminate on the basis of race, color, national origin, age, disability, sex, sexual orientation, or gender identity.            Thank you!     Thank you for choosing [facility]  for your care. Our goal is always to provide you with excellent care. Hearing back from our patients is one way we can continue to improve our services. Please take a few minutes to complete the written survey that you may receive in the mail after your visit with us. Thank you!             Your Updated Medication List - Protect others around you: Learn how to safely use, store and throw away your medicines at www.disposemymeds.org.          This list is accurate as of 6/26/18  4:00 PM.  Always use your most recent med list.                   Brand Name Dispense Instructions for use Diagnosis    acetone (Urine) test Strp     50 each    Test first voiding of the day.    Gestational diabetes       blood glucose monitoring test strip    no brand specified    200 strip    One Touch Verio test strips.  Test 6 times daily.    Gestational diabetes       DiphenhydrAMINE HCl (Sleep) 50 MG Caps      50 mg as needed        L-THEANINE PO      Take 200 mg by mouth daily        prenatal multivitamin plus iron 27-0.8 MG Tabs per tablet      Take 1 tablet by mouth daily

## 2018-06-26 NOTE — PROGRESS NOTES
37 year old,  , presents at 33 6/7 weeks in pregnancy complicated by GDM, hypertension for biophysical assessment.    Fetal Breathing Movements (FBM): Normal - 2  Gross Body Movements (GBM): Normal - 2  Fetal Tone (FT): Normal - 2  AFV: Pocket of amniotic fluid > or = to 2 cm x 2 cm - 2  Quantitative Amniotic Fluid Volume Total: 17.4 cm      BPP 8/8.  FHR = 142bpm Normal.   MCA Not done.  NST Not done.     Singel fetus in cephalic presentation.  Placenta anterior and grade 2.    Recommend weekly assessment.    REGGIE Gallardo MD

## 2018-06-29 ENCOUNTER — HOSPITAL ENCOUNTER (OUTPATIENT)
Dept: ULTRASOUND IMAGING | Facility: CLINIC | Age: 38
Discharge: HOME | End: 2018-06-29
Attending: MIDWIFE | Admitting: ADVANCED PRACTICE MIDWIFE
Payer: COMMERCIAL

## 2018-06-29 ENCOUNTER — OFFICE VISIT (OUTPATIENT)
Dept: OBGYN | Facility: CLINIC | Age: 38
End: 2018-06-29
Attending: ADVANCED PRACTICE MIDWIFE
Payer: COMMERCIAL

## 2018-06-29 VITALS
HEART RATE: 90 BPM | HEIGHT: 62 IN | DIASTOLIC BLOOD PRESSURE: 86 MMHG | BODY MASS INDEX: 34.34 KG/M2 | SYSTOLIC BLOOD PRESSURE: 143 MMHG | WEIGHT: 186.6 LBS

## 2018-06-29 DIAGNOSIS — O09.519 HIGH-RISK PREGNANCY, ELDERLY PRIMIGRAVIDA: Primary | ICD-10-CM

## 2018-06-29 DIAGNOSIS — O13.3 PREGNANCY-INDUCED HYPERTENSION IN THIRD TRIMESTER: ICD-10-CM

## 2018-06-29 DIAGNOSIS — O92.70 LACTATION DISORDER: ICD-10-CM

## 2018-06-29 DIAGNOSIS — O09.519 HIGH-RISK PREGNANCY, ELDERLY PRIMIGRAVIDA: ICD-10-CM

## 2018-06-29 PROCEDURE — G0463 HOSPITAL OUTPT CLINIC VISIT: HCPCS | Mod: ZF

## 2018-06-29 PROCEDURE — 76819 FETAL BIOPHYS PROFIL W/O NST: CPT

## 2018-06-29 RX ORDER — BREAST PUMP
1 EACH MISCELLANEOUS PRN
Qty: 1 EACH | Refills: 0 | Status: SHIPPED | OUTPATIENT
Start: 2018-06-29 | End: 2020-09-24

## 2018-06-29 NOTE — PROGRESS NOTES
"Subjective:      37 year old  at 34w2d presents for a routine prenatal appointment.    Denies cramping/contractions, vaginal bleeding, discharge or leakage of fluid. Report +fetal movement.  No HA, vision changes, ruq/epigastric pain.      Patient concerns: Feeling well. Reviewed home BP monitoring 110-120s/70s-80s. Very concerned about \"diagnosis\" and recommendations for IOL. See discussion below.     Has been diagnosed with gestational diabetes - Reviewed BG since 6/15. FBS 80-94 with one elevated at 95. 1 hour pp typically  with 2 elevated at breakfast, 7 elevated at lunch and 9 elevated at dinner.   Patient states that these numbers have been reviewed by the diabetic educator and 15% are elevated. Continue diet control was recommended; no recommendation for insulin at this time unless 50% elevated.     Objective:  Vitals:    18 1208 18 1213 18 1214 18 1215   BP: 144/87 151/86 134/84 143/86   BP Location:    Right arm   Patient Position:    Chair   Pulse:    90   Weight: 84.6 kg (186 lb 9.6 oz)      Height: 1.575 m (5' 2\")         See OB flowsheet    PHQ9= 1, GAD7= 0    Ultrasound:    37 year old,  , presents at 33 6/7 weeks in pregnancy complicated by GDM, hypertension for biophysical assessment.     Fetal Breathing Movements (FBM): Normal - 2  Gross Body Movements (GBM): Normal - 2  Fetal Tone (FT): Normal - 2  AFV: Pocket of amniotic fluid > or = to 2 cm x 2 cm - 2  Quantitative Amniotic Fluid Volume Total: 17.4 cm     BPP 8/8.  FHR = 142bpm Normal.   MCA Not done.  NST Not done.      Singel fetus in cephalic presentation.  Placenta anterior and grade 2.    Assessment/Plan     Encounter Diagnoses   Name Primary?     High-risk pregnancy, elderly primigravida - [facility] Yes     Lactation disorder      Orders Placed This Encounter   Medications     Misc. Devices (BREAST PUMP) MISC     Si each as needed     Dispense:  1 each     Refill:  0     Plan GBS and CBC with " "plts at 36 weeks.    Patient concerned about previous recommendation for induction of labor.  Has heard from care team IOL at 37 weeks d/t \"gestational hypertension\" and IOL at 39 weeks d/t \"chronic hypertension.\"  States that Internal medicine has repeatedly stated she does not have hypertension (chronic or gestational)- that it is \"white coat\" and that her home monitoring with normotensive BPs excludes the diagnosis of hypertension.  She references her log of home BPs that range from 110-120/70-80. States that she does not agree with an induction recommendation because her BPs are normal. Understands concern related to repeated elevated BPs in clinic since 10/2017 (prior to pregnancy).   Would like to speak with OB MD to review. I agreed with this plan to have a consult appointment with MD.  Does not appear to meet GHTN criteria as elevated BPs were present prior to 20 weeks and prior to pregnancy. Also does not have clearly documented evidence of elevated BPs with home monitoring after 20 weeks.  More likely meets CHTN criteria and therefore recommendations for IOL at 39 weeks; however, diagnosis complicated by normal home BP monitoring and repeated internal med consultations stating that she does not have chronic hypertension.    Continue 2x/week BPP, growth u/s again around 7/15. Plan appt with MD next week.     NITHIN Welsh, GLENNA  "

## 2018-06-29 NOTE — LETTER
"2018       RE: Rona Krishnamurthy  [address] [facility] [address]     Dear Colleague,    Thank you for referring your patient, Rona Krishnamurthy, to the [facility] at [facility]. Please see a copy of my visit note below.    Subjective:      37 year old  at 34w2d presents for a routine prenatal appointment.    Denies cramping/contractions, vaginal bleeding, discharge or leakage of fluid. Report +fetal movement.  No HA, vision changes, ruq/epigastric pain.      Patient concerns: Feeling well. Reviewed home BP monitoring 110-120s/70s-80s. Very concerned about \"diagnosis\" and recommendations for IOL. See discussion below.     Has been diagnosed with gestational diabetes - Reviewed BG since 6/15. FBS 80-94 with one elevated at 95. 1 hour pp typically  with 2 elevated at breakfast, 7 elevated at lunch and 9 elevated at dinner.   Patient states that these numbers have been reviewed by the diabetic educator and 15% are elevated. Continue diet control was recommended; no recommendation for insulin at this time unless 50% elevated.     Objective:  Vitals:    18 1208 18 1213 18 1214 18 1215   BP: 144/87 151/86 134/84 143/86   BP Location:    Right arm   Patient Position:    Chair   Pulse:    90   Weight: 84.6 kg (186 lb 9.6 oz)      Height: 1.575 m (5' 2\")         See OB flowsheet    PHQ9= 1, GAD7= 0    Ultrasound:    37 year old,  , presents at 33 6/7 weeks in pregnancy complicated by GDM, hypertension for biophysical assessment.     Fetal Breathing Movements (FBM): Normal - 2  Gross Body Movements (GBM): Normal - 2  Fetal Tone (FT): Normal - 2  AFV: Pocket of amniotic fluid > or = to 2 cm x 2 cm - 2  Quantitative Amniotic Fluid Volume Total: 17.4 cm     BPP 8/8.  FHR = 142bpm Normal.   MCA Not done.  NST Not done.      Singel fetus in cephalic presentation.  Placenta anterior and grade " "2.    Assessment/Plan     Encounter Diagnoses   Name Primary?     High-risk pregnancy, elderly primigravida - [facility] Yes     Lactation disorder      Orders Placed This Encounter   Medications     Misc. Devices (BREAST PUMP) MISC     Si each as needed     Dispense:  1 each     Refill:  0     Plan GBS and CBC with plts at 36 weeks.    Patient concerned about previous recommendation for induction of labor.  Has heard from care team IOL at 37 weeks d/t \"gestational hypertension\" and IOL at 39 weeks d/t \"chronic hypertension.\"  States that Internal medicine has repeatedly stated she does not have hypertension (chronic or gestational)- that it is \"white coat\" and that her home monitoring with normotensive BPs excludes the diagnosis of hypertension.  She references her log of home BPs that range from 110-120/70-80. States that she does not agree with an induction recommendation because her BPs are normal. Understands concern related to repeated elevated BPs in clinic since 10/2017 (prior to pregnancy).   Would like to speak with OB MD to review. I agreed with this plan to have a consult appointment with MD.  Does not appear to meet GHTN criteria as elevated BPs were present prior to 20 weeks and prior to pregnancy. Also does not have clearly documented evidence of elevated BPs with home monitoring after 20 weeks.  More likely meets CHTN criteria and therefore recommendations for IOL at 39 weeks; however, diagnosis complicated by normal home BP monitoring and repeated internal med consultations stating that she does not have chronic hypertension.    Continue 2x/week BPP, growth u/s again around 7/15. Plan appt with MD next week.     NITHIN Welsh, GLENNA      "

## 2018-06-29 NOTE — MR AVS SNAPSHOT
After Visit Summary   6/29/2018    Rona Krishnamurthy    MRN: [id number]           Patient Information     Date Of Birth          1980        Visit Information        Provider Department      6/29/2018 11:45 AM Salud Alfaro CNM [facility]        Today's Diagnoses     High-risk pregnancy, elderly primigravida - [facility]    -  1    Lactation disorder           Follow-ups after your visit        Follow-up notes from your care team     Return in about 1 week (around 7/6/2018) for 2x week BPP, Appt with .      Your next 10 appointments already scheduled     Jul 03, 2018  1:30 PM CDT   ULTRASOUND with [facility] ULTRASOUND II   [facility] ([facility])    [facility] [address]  [address]  [facility] [address] [phone]            Jul 03, 2018  2:00 PM CDT   RETURN OB with Shelby Pal MD   [facility] ([facility])    [facility] [address]  [address]  [facility] [address] [phone]            Jul 06, 2018 11:00 AM CDT   ULTRASOUND with [facility] ULTRASOUND   [facility] ([facility])    [facility] [address]  [address]  [facility] [address] [phone]            Jul 13, 2018 11:30 AM CDT   RETURN OB with Salud Alfaro CNM   [facility] ([facility])    [facility] [address]  [address]  [facility] [id number] [phone]              Who to contact     Please call your clinic at [phone] to:    Ask questions about your health    Make or cancel appointments    Discuss your medicines    Learn about your test results    Speak to your doctor            Additional Information About Your Visit        MyChart Information     MyChart gives you secure access to your electronic  "health record. If you see a primary care provider, you can also send messages to your care team and make appointments. If you have questions, please call your primary care clinic.  If you do not have a primary care provider, please call [phone] and they will assist you.      [employer] is an electronic gateway that provides easy, online access to your medical records. With [employer], you can request a clinic appointment, read your test results, renew a prescription or communicate with your care team.     To access your existing account, please contact your [facility] or call [phone] for assistance.        Care EveryWhere ID     This is your Care EveryWhere ID. This could be used by other organizations to access your [place] medical records  [id number]        Your Vitals Were     Pulse Height Last Period BMI (Body Mass Index)          90 1.575 m (5' 2\") 10/24/2017 34.13 kg/m2         Blood Pressure from Last 3 Encounters:   06/29/18 143/86   06/15/18 135/86   06/01/18 148/86    Weight from Last 3 Encounters:   06/29/18 84.6 kg (186 lb 9.6 oz)   06/15/18 82.9 kg (182 lb 12.8 oz)   06/01/18 83.5 kg (184 lb)              Today, you had the following     No orders found for display         Today's Medication Changes          These changes are accurate as of 6/29/18 11:59 PM.  If you have any questions, ask your nurse or doctor.               Start taking these medicines.        Dose/Directions    breast pump Misc   Used for:  High-risk pregnancy, elderly primigravida        Dose:  1 each   1 each as needed   Quantity:  1 each   Refills:  0            Where to get your medicines      Some of these will need a paper prescription and others can be bought over the counter.  Ask your nurse if you have questions.     Bring a paper prescription for each of these medications     breast pump Misc                Primary Care Provider Office Phone # Fax #    Dahlia Noriega MD " [phone]       [address] [place] [address] [address]  [facility] [address]        Equal Access to Services     BERT ACUNA : Hadii aad ku haddany Prabhakar, chana jermaineludin, guadalupe kachristida jann, kim sebastiankiran randi. So [facility] [phone].    ATENCIÓN: Si habla español, tiene a trevizo disposición servicios gratuitos de asistencia lingüística. Llame al [phone].    We comply with applicable federal civil rights laws and [place] laws. We do not discriminate on the basis of race, color, national origin, age, disability, sex, sexual orientation, or gender identity.            Thank you!     Thank you for choosing [facility]  for your care. Our goal is always to provide you with excellent care. Hearing back from our patients is one way we can continue to improve our services. Please take a few minutes to complete the written survey that you may receive in the mail after your visit with us. Thank you!             Your Updated Medication List - Protect others around you: Learn how to safely use, store and throw away your medicines at www.disposemymeds.org.          This list is accurate as of 6/29/18 11:59 PM.  Always use your most recent med list.                   Brand Name Dispense Instructions for use Diagnosis    acetone (Urine) test Strp     50 each    Test first voiding of the day.    Gestational diabetes       blood glucose monitoring test strip    no brand specified    200 strip    One Touch Verio test strips.  Test 6 times daily.    Gestational diabetes       breast pump Misc     1 each    1 each as needed    High-risk pregnancy, elderly primigravida       DiphenhydrAMINE HCl (Sleep) 50 MG Caps      50 mg as needed        L-THEANINE PO      Take 200 mg by mouth daily        prenatal multivitamin plus iron 27-0.8 MG Tabs per tablet      Take 1 tablet by mouth daily

## 2018-07-03 ENCOUNTER — TELEPHONE (OUTPATIENT)
Dept: EDUCATION SERVICES | Facility: CLINIC | Age: 38
End: 2018-07-03

## 2018-07-03 ENCOUNTER — OFFICE VISIT (OUTPATIENT)
Dept: OBGYN | Facility: CLINIC | Age: 38
End: 2018-07-03
Payer: COMMERCIAL

## 2018-07-03 ENCOUNTER — OFFICE VISIT (OUTPATIENT)
Dept: OBGYN | Facility: CLINIC | Age: 38
End: 2018-07-03
Attending: OBSTETRICS & GYNECOLOGY
Payer: COMMERCIAL

## 2018-07-03 VITALS — HEIGHT: 62 IN

## 2018-07-03 DIAGNOSIS — O09.519 HIGH-RISK PREGNANCY, ELDERLY PRIMIGRAVIDA: Primary | ICD-10-CM

## 2018-07-03 DIAGNOSIS — O13.3 PREGNANCY-INDUCED HYPERTENSION IN THIRD TRIMESTER: ICD-10-CM

## 2018-07-03 DIAGNOSIS — O24.410 DIET CONTROLLED GESTATIONAL DIABETES MELLITUS (GDM) IN THIRD TRIMESTER: ICD-10-CM

## 2018-07-03 DIAGNOSIS — O09.519 HIGH-RISK PREGNANCY, ELDERLY PRIMIGRAVIDA: ICD-10-CM

## 2018-07-03 PROCEDURE — 76819 FETAL BIOPHYS PROFIL W/O NST: CPT | Mod: ZF

## 2018-07-03 NOTE — MR AVS SNAPSHOT
After Visit Summary   7/3/2018    Rona Krishnamurthy    MRN: [id number]           Patient Information     Date Of Birth          1980        Visit Information        Provider Department      7/3/2018 1:30 PM [facility] ULTRASOUND II [facility]        Today's Diagnoses     High-risk pregnancy, elderly primigravida - [facility] CNM        Gestational Hypertension           Follow-ups after your visit        Your next 10 appointments already scheduled     Jul 06, 2018 11:00 AM CDT   ULTRASOUND with [facility] ULTRASOUND   [facility] ([facility])    [facility] [address]  [address]  [facility] [address] [phone]            Jul 10, 2018 10:00 AM CDT   ULTRASOUND with [facility] ULTRASOUND   [facility] ([facility])    [facility] [address]  [address]  [address]  [facility] [id number] [phone]            Jul 13, 2018 11:00 AM CDT   ULTRASOUND with [facility] ULTRASOUND   [facility] ([facility])    [facility] [address]  [address]  [address]  [facility] [id number] [phone]            Jul 13, 2018 11:30 AM CDT   RETURN OB with Salud Alfaro CNM   [facility] ([facility])    [facility] [address]  [address]  [facility] [address] [phone]              Who to contact     Please call your clinic at [phone] to:    Ask questions about your health    Make or cancel appointments    Discuss your medicines    Learn about your test results    Speak to your doctor            Additional Information About Your Visit        MyChart Information     [employer] gives you secure access to your electronic health record. If you see a primary care provider, you can also send messages to your care team and make appointments. If you have  questions, please call your primary care clinic.  If you do not have a primary care provider, please call [phone] and they will assist you.      [employer] is an electronic gateway that provides easy, online access to your medical records. With [employer], you can request a clinic appointment, read your test results, renew a prescription or communicate with your care team.     To access your existing account, please contact your [facility] or call [phone] for assistance.        Care EveryWhere ID     This is your Care EveryWhere ID. This could be used by other organizations to access your [place] medical records  [id number]        Your Vitals Were     Last Period                   10/24/2017            Blood Pressure from Last 3 Encounters:   06/29/18 143/86   06/15/18 135/86   06/01/18 148/86    Weight from Last 3 Encounters:   06/29/18 84.6 kg (186 lb 9.6 oz)   06/15/18 82.9 kg (182 lb 12.8 oz)   06/01/18 83.5 kg (184 lb)              We Performed the Following     BPP (Single) w/out NST (In Clinic)        Primary Care Provider Office Phone # Fax #    Dahlia Paula Noriega [phone]       [address] [place] [address]        Equal Access to Services     BERT ACUNA : Hadii aad ku hadasho Soomaali, waaxda luqadaha, qaybta kaalmada adeegyada, kim rousseau hayjaison romero . So [facility] [phone].    ATENCIÓN: Si habla español, tiene a trevizo disposición servicios gratuitos de asistencia lingüística. Jean-Claude al [phone].    We comply with applicable federal civil rights laws and [place] laws. We do not discriminate on the basis of race, color, national origin, age, disability, sex, sexual orientation, or gender identity.            Thank you!     Thank you for choosing [facility]  for your care. Our goal is always to provide you with excellent care. Hearing back from our patients is one way we can  continue to improve our services. Please take a few minutes to complete the written survey that you may receive in the mail after your visit with us. Thank you!             Your Updated Medication List - Protect others around you: Learn how to safely use, store and throw away your medicines at www.disposemymeds.org.          This list is accurate as of 7/3/18  2:40 PM.  Always use your most recent med list.                   Brand Name Dispense Instructions for use Diagnosis    acetone (Urine) test Strp     50 each    Test first voiding of the day.    Gestational diabetes       blood glucose monitoring test strip    no brand specified    200 strip    One Touch Verio test strips.  Test 6 times daily.    Gestational diabetes       breast pump Misc     1 each    1 each as needed    High-risk pregnancy, elderly primigravida       DiphenhydrAMINE HCl (Sleep) 50 MG Caps      50 mg as needed        L-THEANINE PO      Take 200 mg by mouth daily        prenatal multivitamin plus iron 27-0.8 MG Tabs per tablet      Take 1 tablet by mouth daily

## 2018-07-03 NOTE — PROGRESS NOTES
37 year old,  , presents at 34 6/7 weeks in pregnancy complicated by  GDM, hypertension  for biophysical assessment.    Fetal Breathing Movements (FBM): Normal - 2  Gross Body Movements (GBM): Normal - 2  Fetal Tone (FT): Normal - 2  AFV: Pocket of amniotic fluid > or = to 2 cm x 2 cm - 2  Quantitative Amniotic Fluid Volume Total: 18.2 cm      BPP 8/8.  FHR = 130bpm Normal.   MCA Not done.  NST Not done.     Single fetus in cepahlic presentation.  Placenta posterior and grade 2.    Recommend twice weekly BPP assessment, q3-4 week growth scans.    REGGIE Chan MD

## 2018-07-03 NOTE — LETTER
7/3/2018     RE: Rona Krishnamurthy  [address] [facility] [address]     Dear Colleague,    Thank you for referring your patient, Rona Krishnamurthy, to the [facility] at [facility]. Please see a copy of my visit note below.    LACHO Visit 7/3/18    S: Patient states she is really feeling very good.  She denies any ctx, VB or LOF.  + FM.  Denies HA, scotoma, SOB, RUQ pain.  Occasional swelling in feet, worse with heat.  Patient with good blood sugar control with occasional elevation of her 1 hour PP value, she notices this is the case when she does not walk after eating usually.  Patient would like to discuss her blood pressures and reasons for recommendation of induction of labor.  She states she is not necessarily against it, but wants to make sure she makes informed changes and understands the reasoning behind recommendations.     O:  See OB Flowsheet    A/P: 36 yo  @ 34w6d presents for LACHO visit and discussion of hypertensive disorder of pregnancy  1) Prenatal care: Rh positive, Myrna negative, Rubella immune, Remainder NOB labs wnl.  2) Genetic screening/AMA: Declined genetic screening, Level II US with poor views sacral spine and upper lip, repeat 3 weeks later and all normal, having a GIRL  3) Hypertensive disorder of pregnancy: Discussed with patient there is good documentation between her normal 24 hour BP testing in 10/2017 pre-pregnancy and through her meetings with Dr. Bradley Noriega suggesting NO chronic hypertension.  Patient does have a strong family history of hypertension and preeclampsia.  Given her risk factors she has been taking aspirin since 12 weeks gestation.  She has had multiple sets of HELLP labs, which have all been normal without any evidence of proteinuria to date.  I discussed with the patient that even with her home BP measurements, she has had at least 2 documented home systolic BP in the 140s since 20 weeks  gestation.  Given this, she does meet the criteria for gestational hypertension.  We discussed our reasonings today behind  testing with serial growth US and twice weekly BPP to ensure appropriate growth and ensure reassuring fetal status which can change very quickly with gestational hypertension.  We also had a good discussion surrounding recommendations for induction of labor at 37 weeks.  We discussed risks of worsening hypertensive disorder of pregnancy at term and balancing that with likelihood of good fetal outcome when born at term.  We also did discuss that if patient does not feel comfortable with induction at 37 weeks, she does NOT have to move forward with those plans, but it is important she understands that would be our recommendation and the reasoning behind that.  She does acknowledge that today.  We discussed that if she did not choose induction at 37 weeks, would recommend continuing twice weekly BPP, q3-4 week growth scans and would STRONGLY RECOMMEND induction of labor at the latest at 39 weeks or earlier if indicated by worsening hypertensive disorder of pregnancy or nonreassuring fetal  testing.  She does seem to be more agreeable to that.  Patient really appreciated our conversation today and understands the reasoning behind our recommendations.  At this time, she would like to see how things go the next couple weeks and will play things by ear.  She is completely agreeable to induction if medically or fetally indicated and she will continue to discuss her desires with us moving forward.  Last growth 18 with EFW 69%ile, BPP today .  4) GDMA1: Glucoses in general well controlled, no numbers to recommend initiation of any medical management at this time, continue to monitor FBG and 1 hour PP values  5) Labor plans: Unsure pain control, considering NO and Epidural/Breastfeed/Unsure contraception  6) JORGE: On oral supplementation, consider repeat Hgb at next visit  7) s/p  flu and Tdap vaccines  8) RTC in 1-2 weeks for LACHO visit, earlier with any concerns    Shelby Pal MD

## 2018-07-03 NOTE — PROGRESS NOTES
LACHO Visit 7/3/18    S: Patient states she is really feeling very good.  She denies any ctx, VB or LOF.  + FM.  Denies HA, scotoma, SOB, RUQ pain.  Occasional swelling in feet, worse with heat.  Patient with good blood sugar control with occasional elevation of her 1 hour PP value, she notices this is the case when she does not walk after eating usually.  Patient would like to discuss her blood pressures and reasons for recommendation of induction of labor.  She states she is not necessarily against it, but wants to make sure she makes informed changes and understands the reasoning behind recommendations.     O:  See OB Flowsheet    A/P: 38 yo  @ 34w6d presents for LACHO visit and discussion of hypertensive disorder of pregnancy  1) Prenatal care: Rh positive, Myrna negative, Rubella immune, Remainder NOB labs wnl.  2) Genetic screening/AMA: Declined genetic screening, Level II US with poor views sacral spine and upper lip, repeat 3 weeks later and all normal, having a GIRL  3) Hypertensive disorder of pregnancy: Discussed with patient there is good documentation between her normal 24 hour BP testing in 10/2017 pre-pregnancy and through her meetings with Dr. Bradley Noriega suggesting NO chronic hypertension.  Patient does have a strong family history of hypertension and preeclampsia.  Given her risk factors she has been taking aspirin since 12 weeks gestation.  She has had multiple sets of HELLP labs, which have all been normal without any evidence of proteinuria to date.  I discussed with the patient that even with her home BP measurements, she has had at least 2 documented home systolic BP in the 140s since 20 weeks gestation.  Given this, she does meet the criteria for gestational hypertension.  We discussed our reasonings today behind  testing with serial growth US and twice weekly BPP to ensure appropriate growth and ensure reassuring fetal status which can change very quickly with gestational  hypertension.  We also had a good discussion surrounding recommendations for induction of labor at 37 weeks.  We discussed risks of worsening hypertensive disorder of pregnancy at term and balancing that with likelihood of good fetal outcome when born at term.  We also did discuss that if patient does not feel comfortable with induction at 37 weeks, she does NOT have to move forward with those plans, but it is important she understands that would be our recommendation and the reasoning behind that.  She does acknowledge that today.  We discussed that if she did not choose induction at 37 weeks, would recommend continuing twice weekly BPP, q3-4 week growth scans and would STRONGLY RECOMMEND induction of labor at the latest at 39 weeks or earlier if indicated by worsening hypertensive disorder of pregnancy or nonreassuring fetal  testing.  She does seem to be more agreeable to that.  Patient really appreciated our conversation today and understands the reasoning behind our recommendations.  At this time, she would like to see how things go the next couple weeks and will play things by ear.  She is completely agreeable to induction if medically or fetally indicated and she will continue to discuss her desires with us moving forward.  Last growth 18 with EFW 69%ile, BPP today .  4) GDMA1: Glucoses in general well controlled, no numbers to recommend initiation of any medical management at this time, continue to monitor FBG and 1 hour PP values  5) Labor plans: Unsure pain control, considering NO and Epidural/Breastfeed/Unsure contraception  6) JORGE: On oral supplementation, consider repeat Hgb at next visit  7) s/p flu and Tdap vaccines  8) RTC in 1-2 weeks for LACHO visit, earlier with any concerns    Shelby Pal MD

## 2018-07-03 NOTE — LETTER
7/3/2018     RE: Rona Krishnamurthy  [address] [facility] [address]     Dear Colleague,    Thank you for referring your patient, Rona Krishnamurthy, to the [facility] at [facility]. Please see a copy of my visit note below.    37 year old,  , presents at 34 6/7 weeks in pregnancy complicated by  GDM, hypertension  for biophysical assessment.    Fetal Breathing Movements (FBM): Normal - 2  Gross Body Movements (GBM): Normal - 2  Fetal Tone (FT): Normal - 2  AFV: Pocket of amniotic fluid > or = to 2 cm x 2 cm - 2  Quantitative Amniotic Fluid Volume Total: 18.2 cm    BPP 8/8.  FHR = 130bpm Normal.   MCA Not done.  NST Not done.     Single fetus in cepahlic presentation.  Placenta posterior and grade 2.    Recommend twice weekly BPP assessment, q3-4 week growth scans.    REGGIE Chan MD

## 2018-07-03 NOTE — MR AVS SNAPSHOT
After Visit Summary   7/3/2018    Rona Krishnamurthy    MRN: [id number]           Patient Information     Date Of Birth          1980        Visit Information        Provider Department      7/3/2018 2:00 PM Shelby Pal MD [facility]        Today's Diagnoses     High-risk pregnancy, elderly primigravida - [facility] CN    -  1    Gestational Hypertension        Diet controlled gestational diabetes mellitus (GDM) in third trimester           Follow-ups after your visit        Your next 10 appointments already scheduled     Jul 06, 2018 11:00 AM CDT   ULTRASOUND with [facility] ULTRASOUND   [facility] ([facility])    [facility] [address]  [address]  [address]  [facility] [id number] [phone]            Jul 10, 2018 10:00 AM CDT   ULTRASOUND with [facility] ULTRASOUND   [facility] ([facility])    [facility] [address]  [address]  [facility] [address] [phone]            Jul 13, 2018 11:00 AM CDT   ULTRASOUND with [facility] ULTRASOUND   [facility] ([facility])    [facility] [address]  [address]  [facility] [id number] [phone]            Jul 13, 2018 11:30 AM CDT   RETURN OB with Salud Alfaro CNM   [facility] ([facility])    [facility] [address]  [address]  [facility] [address] [phone]              Who to contact     Please call your clinic at [phone] to:    Ask questions about your health    Make or cancel appointments    Discuss your medicines    Learn about your test results    Speak to your doctor            Additional Information About Your Visit        MyChart Information     [employer] gives you secure access to your electronic health record. If you see a primary care provider, you  "can also send messages to your care team and make appointments. If you have questions, please call your primary care clinic.  If you do not have a primary care provider, please call [phone] and they will assist you.      [employer] is an electronic gateway that provides easy, online access to your medical records. With [employer], you can request a clinic appointment, read your test results, renew a prescription or communicate with your care team.     To access your existing account, please contact your [facility] or call [phone] for assistance.        Care EveryWhere ID     This is your Care EveryWhere ID. This could be used by other organizations to access your [place] medical records  [id number]        Your Vitals Were     Height Last Period                1.575 m (5' 2\") 10/24/2017           Blood Pressure from Last 3 Encounters:   06/29/18 143/86   06/15/18 135/86   06/01/18 148/86    Weight from Last 3 Encounters:   06/29/18 84.6 kg (186 lb 9.6 oz)   06/15/18 82.9 kg (182 lb 12.8 oz)   06/01/18 83.5 kg (184 lb)              Today, you had the following     No orders found for display       Primary Care Provider Office Phone # Fax #    Dahlia Paula Noriega [phone]       [address] [place] [address]  [facility] [address]        Equal Access to Services     [facility]: Hadii mak owen hadasho Sokaela, waaxda luqadaha, qaybta kaalmada adeegyada, kim romero . So [facility] [phone].    ATENCIÓN: Si habla español, tiene a trevizo disposición servicios gratuitos de asistencia lingüística. Llame al [phone].    We comply with applicable federal civil rights laws and [place] laws. We do not discriminate on the basis of race, color, national origin, age, disability, sex, sexual orientation, or gender identity.            Thank you!     Thank you for choosing [facility]  for your care. Our goal is always to " provide you with excellent care. Hearing back from our patients is one way we can continue to improve our services. Please take a few minutes to complete the written survey that you may receive in the mail after your visit with us. Thank you!             Your Updated Medication List - Protect others around you: Learn how to safely use, store and throw away your medicines at www.disposemymeds.org.          This list is accurate as of 7/3/18  4:27 PM.  Always use your most recent med list.                   Brand Name Dispense Instructions for use Diagnosis    acetone (Urine) test Strp     50 each    Test first voiding of the day.    Gestational diabetes       blood glucose monitoring test strip    no brand specified    200 strip    One Touch Verio test strips.  Test 6 times daily.    Gestational diabetes       breast pump Misc     1 each    1 each as needed    High-risk pregnancy, elderly primigravida       DiphenhydrAMINE HCl (Sleep) 50 MG Caps      50 mg as needed        L-THEANINE PO      Take 200 mg by mouth daily        prenatal multivitamin plus iron 27-0.8 MG Tabs per tablet      Take 1 tablet by mouth daily

## 2018-07-03 NOTE — TELEPHONE ENCOUNTER
Gestational Diabetes Follow-up    Subjective/Objective:    Ronabronson Krishnamurthy sent in blood glucose log for review. Last date of communication with CDE was: 6/26.    Gestational diabetes is being managed with diet and activity    Taking diabetes medications: no    Estimated Date of Delivery: Aug 8, 2018    BG/Food Log:   Fasting glucose - 88, 81, 94, 95, 92, 87 94  One hour after breakfast - 138, 112, 120, 127, 126, 116, 134  One hour after lunch - 108, 117, 154, 144, 145, 109, 102  One hour after dinner - 133, 130, 153, 141, 136, 128, 118    Note from Bronson:  Hi there,    Sorry I forgot to send these on Friday! Everything continues to go pretty well with managing the gestational diabetes. When I go above 140 it tends not to be much and if it is more than a few points, I can usually figure out why - I tried something I haven't previously (since getting diagnosed) or I couldn't do any activity after eating.     I continue to test my urine for ketones every morning and that's been negative as well.    I appreciate all of your support. I will reach out immediately if anything starts to change or trend differently. Please let me know if you need anything additional from me.     Thanks,   Bronson Krishnamurthy       Assessment:    Ketones: negative.   Fasting blood glucoses: 100% in target.  After breakfast: 100% in target.  After lunch: 57% in target.  After dinner: 71% in target.    Plan/Response:  Meal Plan Recommendation: Encouraged patient to adhere to meal plan and to avoid any foods that show a pattern of causing spikes in glucose.  Exercise / activity plan: encouraged patient to walk after meals  Continue to check BG 4 times daily (fasting and one hour(s) after each meal).  Reduce ketone checks to once a week.  Keep a food record for the next follow-up.  Send food records and glucose records in one week.    Any diabetes medication dose changes were made via the CDE Protocol and Collaborative Practice Agreement with the  patient's referring provider. A copy of this encounter was shared with the provider.

## 2018-07-06 ENCOUNTER — OFFICE VISIT (OUTPATIENT)
Dept: OBGYN | Facility: CLINIC | Age: 38
End: 2018-07-06
Payer: COMMERCIAL

## 2018-07-06 DIAGNOSIS — O09.519 HIGH-RISK PREGNANCY, ELDERLY PRIMIGRAVIDA: ICD-10-CM

## 2018-07-06 DIAGNOSIS — O13.3 PREGNANCY-INDUCED HYPERTENSION IN THIRD TRIMESTER: ICD-10-CM

## 2018-07-06 PROCEDURE — 76819 FETAL BIOPHYS PROFIL W/O NST: CPT | Mod: ZF

## 2018-07-06 NOTE — LETTER
2018       RE: Rona Krishnamurthy  [address] [facility] [address]     Dear Colleague,    Thank you for referring your patient, Rona Krishnamurthy, to the [facility] at [facility]. Please see a copy of my visit note below.    37 year old,  , presents at 35 2/7 weeks in pregnancy complicated by GDM, GHTN for biophysical assessment.    Fetal Breathing Movements (FBM): Normal - 2  Gross Body Movements (GBM): Normal - 2  Fetal Tone (FT): Normal - 2  AFV: Pocket of amniotic fluid > or = to 2 cm x 2 cm - 2  Quantitative Amniotic Fluid Volume Total: 21.8 cm      BPP 8/8.  FHR = 149bpm Normal.   MCA Not done.  NST Not done.     Single fetus in cephalic presentation.  Placenta anterior and grade 2.    Recommend continue twice weekly assessment.    REGGIE Gallardo MD  2018      Again, thank you for allowing me to participate in the care of your patient.      Sincerely,    [facility] Ultrasound

## 2018-07-06 NOTE — PROGRESS NOTES
37 year old,  , presents at 35 2/7 weeks in pregnancy complicated by GDM, GHTN for biophysical assessment.    Fetal Breathing Movements (FBM): Normal - 2  Gross Body Movements (GBM): Normal - 2  Fetal Tone (FT): Normal - 2  AFV: Pocket of amniotic fluid > or = to 2 cm x 2 cm - 2  Quantitative Amniotic Fluid Volume Total: 21.8 cm      BPP 8/8.  FHR = 149bpm Normal.   MCA Not done.  NST Not done.     Single fetus in cephalic presentation.  Placenta anterior and grade 2.    Recommend continue twice weekly assessment.    REGGIE Gallardo MD  2018

## 2018-07-06 NOTE — MR AVS SNAPSHOT
After Visit Summary   7/6/2018    Rona Krishnamurthy    MRN: [id number]           Patient Information     Date Of Birth          1980        Visit Information        Provider Department      7/6/2018 11:00 AM [facility] ULTRASOUND [facility]        Today's Diagnoses     High-risk pregnancy, elderly primigravida - [facility]        Gestational Hypertension           Follow-ups after your visit        Your next 10 appointments already scheduled     Jul 10, 2018 10:00 AM CDT   ULTRASOUND with [facility] ULTRASOUND   [facility] ([facility])    [facility] [facility] [address]  [address]  [facility] [id number] [phone]            Jul 13, 2018 11:00 AM CDT   ULTRASOUND with [facility] ULTRASOUND   [facility] ([facility])    [facility] [facility] [address]  [address]  [address]  [facility] [id number] [phone]            Jul 13, 2018 11:30 AM CDT   RETURN OB with ANTONIO Juarez   [facility] ([facility])    [facility] [facility] [address]  [address]  [address]  [facility] [address] [phone]              Who to contact     Please call your clinic at [phone] to:    Ask questions about your health    Make or cancel appointments    Discuss your medicines    Learn about your test results    Speak to your doctor            Additional Information About Your Visit        MyChart Information     [employer] gives you secure access to your electronic health record. If you see a primary care provider, you can also send messages to your care team and make appointments. If you have questions, please call your primary care clinic.  If you do not have a primary care provider, please call [phone] and they will assist you.      [employer] is an electronic gateway that provides easy, online access to your medical records. With  [place], you can request a clinic appointment, read your test results, renew a prescription or communicate with your care team.     To access your existing account, please contact your [facility] or call [phone] for assistance.        Care EveryWhere ID     This is your Care EveryWhere ID. This could be used by other organizations to access your [place] medical records  [id number]        Your Vitals Were     Last Period                   10/24/2017            Blood Pressure from Last 3 Encounters:   06/29/18 143/86   06/15/18 135/86   06/01/18 148/86    Weight from Last 3 Encounters:   06/29/18 84.6 kg (186 lb 9.6 oz)   06/15/18 82.9 kg (182 lb 12.8 oz)   06/01/18 83.5 kg (184 lb)              We Performed the Following     BPP (Single) w/out NST (In Clinic)        Primary Care Provider Office Phone # Fax #    Dahlia Paula Noriega [phone]       [address] [place] [address]        Equal Access to Services     [facility] : Hadii mak ku hadasho Sokaela, waaxda luqadaha, qaybta kaalmada jann, kim romero . So [facility] [phone].    ATENCIÓN: Si habla español, tiene a trevizo disposición servicios gratuitos de asistencia lingüística. [facility] [phone].    We comply with applicable federal civil rights laws and [place] laws. We do not discriminate on the basis of race, color, national origin, age, disability, sex, sexual orientation, or gender identity.            Thank you!     Thank you for choosing [facility]  for your care. Our goal is always to provide you with excellent care. Hearing back from our patients is one way we can continue to improve our services. Please take a few minutes to complete the written survey that you may receive in the mail after your visit with us. Thank you!             Your Updated Medication List - Protect others around you: Learn how to safely  use, store and throw away your medicines at www.disposemymeds.org.          This list is accurate as of 7/6/18  3:50 PM.  Always use your most recent med list.                   Brand Name Dispense Instructions for use Diagnosis    acetone (Urine) test Strp     50 each    Test first voiding of the day.    Gestational diabetes       blood glucose monitoring test strip    no brand specified    200 strip    One Touch Verio test strips.  Test 6 times daily.    Gestational diabetes       breast pump Misc     1 each    1 each as needed    High-risk pregnancy, elderly primigravida       DiphenhydrAMINE HCl (Sleep) 50 MG Caps      50 mg as needed        L-THEANINE PO      Take 200 mg by mouth daily        prenatal multivitamin plus iron 27-0.8 MG Tabs per tablet      Take 1 tablet by mouth daily

## 2018-07-08 ENCOUNTER — MEDICAL CORRESPONDENCE (OUTPATIENT)
Dept: HEALTH INFORMATION MANAGEMENT | Facility: CLINIC | Age: 38
End: 2018-07-08

## 2018-07-10 ENCOUNTER — TELEPHONE (OUTPATIENT)
Dept: EDUCATION SERVICES | Facility: CLINIC | Age: 38
End: 2018-07-10

## 2018-07-10 ENCOUNTER — RADIANT APPOINTMENT (OUTPATIENT)
Dept: ULTRASOUND IMAGING | Facility: CLINIC | Age: 38
End: 2018-07-10
Payer: COMMERCIAL

## 2018-07-10 DIAGNOSIS — O13.3 GESTATIONAL HYPERTENSION, THIRD TRIMESTER: ICD-10-CM

## 2018-07-10 DIAGNOSIS — O09.519 HIGH-RISK PREGNANCY, ELDERLY PRIMIGRAVIDA: ICD-10-CM

## 2018-07-10 PROCEDURE — 76819 FETAL BIOPHYS PROFIL W/O NST: CPT

## 2018-07-10 NOTE — TELEPHONE ENCOUNTER
Gestational Diabetes Follow-up    Subjective/Objective:    Rona Krishnamurthy sent in blood glucose log for review. Last date of communication was: 7/3/18.  Patient is 36 weeks gestation.  Gestational diabetes is being managed with diet and activity    Taking diabetes medications: no    Estimated Date of Delivery: Aug 8, 2018    BG/Food Log:   See logs attached to this appointment.   Diet is high in added sugar and very low in fruits and vegetables.  Added sugar intake does not exceed 25% daily kcal.  Patient is following carb recommendations 30-45g BF, 45-60g Lunch, 45-60g Dinner, 15-30g Snacks  Most episodes of post prandial hyperglycemia are after [employer] cereal, cinnamon raisin bran and one occasion of fast food. Patient is most often choosing sweets for snacks.  Patient's diet is low in fiber and does not meet recommended intake during pregnancy of 28 grams per day.   Current average fiber intake for patient is 14 grams.    Assessment:    Ketones: negative.   Fasting blood glucoses: 100% in target.  After breakfast: 71% in target.  After lunch: 86% in target.  After dinner: 57% in target.    Plan/Response:  Continue to test glucose four times per day.  Continue walking after meals as able.  Recommend to decrease intake of sugary cereal, breads and sweets. Decreased added sugar.  Increase fiber from vegetables, fruit and whole grains.  Message sent to OB regarding blood glucose readings, for agreement with plan and to see if they would like to get endo involved in patients treatment.   Any diabetes medication dose changes were made via the CDE Protocol and Collaborative Practice Agreement with the patient's referring provider. A copy of this encounter was shared with the provider.

## 2018-07-13 ENCOUNTER — RADIANT APPOINTMENT (OUTPATIENT)
Dept: ULTRASOUND IMAGING | Facility: CLINIC | Age: 38
End: 2018-07-13
Attending: ADVANCED PRACTICE MIDWIFE
Payer: COMMERCIAL

## 2018-07-13 ENCOUNTER — OFFICE VISIT (OUTPATIENT)
Dept: OBGYN | Facility: CLINIC | Age: 38
End: 2018-07-13
Attending: ADVANCED PRACTICE MIDWIFE
Payer: COMMERCIAL

## 2018-07-13 DIAGNOSIS — O13.3 GESTATIONAL HYPERTENSION, THIRD TRIMESTER: ICD-10-CM

## 2018-07-13 DIAGNOSIS — O09.519 HIGH-RISK PREGNANCY, ELDERLY PRIMIGRAVIDA: Primary | ICD-10-CM

## 2018-07-13 DIAGNOSIS — O09.519 HIGH-RISK PREGNANCY, ELDERLY PRIMIGRAVIDA: ICD-10-CM

## 2018-07-13 DIAGNOSIS — O13.3 PREGNANCY-INDUCED HYPERTENSION IN THIRD TRIMESTER: ICD-10-CM

## 2018-07-13 PROCEDURE — 87653 STREP B DNA AMP PROBE: CPT | Performed by: ADVANCED PRACTICE MIDWIFE

## 2018-07-13 PROCEDURE — 85027 COMPLETE CBC AUTOMATED: CPT | Performed by: ADVANCED PRACTICE MIDWIFE

## 2018-07-13 PROCEDURE — 76816 OB US FOLLOW-UP PER FETUS: CPT

## 2018-07-13 PROCEDURE — G0463 HOSPITAL OUTPT CLINIC VISIT: HCPCS | Mod: 25

## 2018-07-13 NOTE — LETTER
"2018       RE: Rona Krishnamurthy  [address] [facility] [address]     Dear Colleague,    Thank you for referring your patient, Rona Krishnamurthy, to the [facility] at [facility]. Please see a copy of my visit note below.    Subjective:      37 year old  at 36w2d presents for a routine prenatal appointment.     Denies cramping/contractions, vaginal bleeding, discharge or leakage of fluid. Report +fetal movement.  No HA, vision changes, ruq/epigastric pain.      Patient concerns: Feeling well overall. Happy with her consult appointment with Dr. Pal. Per MD, pt has GHTN not CHTN and recommendation is for IOL at 37 weeks. Pt understands reasoning and risks, however, declines 37 week IOL. Per MD, strongly recommends IOL by 39 weeks. Today, pt states that she thinks she would want to schedule her induction for around 38 weeks- either  or . Wondering about weekend IOL as her partner needs to go to the methadone clinic during the weekdays.     Home BP monitoring has remained WNL.114-129/78-85.  Home blood glucose monitoring is 20% out of range, 80% in range. Has been reviewed by endocrine and no recommendation for insulin unless 50% out of range. Monitor summary on 7/10 showed 100% FBS in range, 71% pp breakfast in range, 86% lunch in range, 57% dinner in range.  Has been trying to go on walks after meals.    Objective:  Vitals:    18 1140   BP: 163/88   Pulse: 85   Weight: 84.1 kg (185 lb 4.8 oz)   Height: 1.575 m (5' 2.01\")      See OB flowsheet    Ultrasound:  Fetal Breathing Movements (FBM): Normal - 2  Gross Body Movements (GBM): Normal - 2  Fetal Tone (FT): Normal - 2  AFV: Pocket of amniotic fluid > or = to 2 cm x 2 cm - 2  Quantitative Amniotic Fluid Volume Total: 21.6 cm      BPP 8/8.  FHR = 138bpm Normal.   MCA Not done.  NST Not done.       USEGA = 36 3/7 weeks.  EFW = 2,797 grams, 42 % for " 36 weeks.      Single fetus in cephalic presentation.  Placenta anterior and grade 2.      Appropriate interval growth  Recommend twice weekly assessment and serial growth ultrasounds.    Assessment/Plan     Encounter Diagnoses   Name Primary?     High-risk pregnancy, elderly primigravida - [facility] CNM Yes     Gestational Hypertension- Per MD, IOL 37-39      Orders Placed This Encounter   Procedures     CBC with Platelets     Labor signs discussed. Reinforced daily fetal movement counts.  Reviewed why/how to contact provider if headache/visual changes/RUQ or epigastric pain, decreased fetal movement, vaginal bleeding, leakage of fluid.    GBS collected.   CBC with plts ordered.    Reviewed recommendations from Dr. Pal for IOL at 37 weeks, with no later than 39 weeks if patient declines.  Continue home BP monitoring and notify clinic asap for any worsening of readings.  Continue BG monitoring.     Continue 2x week BPP- scheduled for 7/17 and 7/20.  Next LACHO appointment with GLENNA on 7/17.   Plan to discuss timing of induction of labor and schedule with BP.   Pt considering IOL on 7/28 or 7/29.     NITHIN Welsh, GLENNA

## 2018-07-13 NOTE — PROGRESS NOTES
"Subjective:      37 year old  at 36w2d presents for a routine prenatal appointment.     Denies cramping/contractions, vaginal bleeding, discharge or leakage of fluid. Report +fetal movement.  No HA, vision changes, ruq/epigastric pain.      Patient concerns: Feeling well overall. Happy with her consult appointment with Dr. Pal. Per MD, pt has GHTN not CHTN and recommendation is for IOL at 37 weeks. Pt understands reasoning and risks, however, declines 37 week IOL. Per MD, strongly recommends IOL by 39 weeks. Today, pt states that she thinks she would want to schedule her induction for around 38 weeks- either  or . Wondering about weekend IOL as her partner needs to go to the methadone clinic during the weekdays.     Home BP monitoring has remained WNL.114-129/78-85.  Home blood glucose monitoring is 20% out of range, 80% in range. Has been reviewed by endocrine and no recommendation for insulin unless 50% out of range. Monitor summary on 7/10 showed 100% FBS in range, 71% pp breakfast in range, 86% lunch in range, 57% dinner in range.  Has been trying to go on walks after meals.    Objective:  Vitals:    18 1140   BP: 163/88   Pulse: 85   Weight: 84.1 kg (185 lb 4.8 oz)   Height: 1.575 m (5' 2.01\")      See OB flowsheet    Ultrasound:  Fetal Breathing Movements (FBM): Normal - 2  Gross Body Movements (GBM): Normal - 2  Fetal Tone (FT): Normal - 2  AFV: Pocket of amniotic fluid > or = to 2 cm x 2 cm - 2  Quantitative Amniotic Fluid Volume Total: 21.6 cm      BPP 8/8.  FHR = 138bpm Normal.   MCA Not done.  NST Not done.       USEGA = 36 3/7 weeks.  EFW = 2,797 grams, 42 % for 36 weeks.      Single fetus in cephalic presentation.  Placenta anterior and grade 2.      Appropriate interval growth  Recommend twice weekly assessment and serial growth ultrasounds.    Assessment/Plan     Encounter Diagnoses   Name Primary?     High-risk pregnancy, elderly primigravida - [facility] Yes     Gestational " Hypertension- Per MD, IOL 37-39      Orders Placed This Encounter   Procedures     CBC with Platelets     Labor signs discussed. Reinforced daily fetal movement counts.  Reviewed why/how to contact provider if headache/visual changes/RUQ or epigastric pain, decreased fetal movement, vaginal bleeding, leakage of fluid.    GBS collected.   CBC with plts ordered.    Reviewed recommendations from Dr. Pla for IOL at 37 weeks, with no later than 39 weeks if patient declines.  Continue home BP monitoring and notify clinic asap for any worsening of readings.  Continue BG monitoring.     Continue 2x week BPP- scheduled for 7/17 and 7/20.  Next LACHO appointment with ANTONIOM on 7/17.   Plan to discuss timing of induction of labor and schedule with BP.   Pt considering IOL on 7/28 or 7/29.     NITHIN Welsh, GLENNA

## 2018-07-13 NOTE — MR AVS SNAPSHOT
After Visit Summary   7/13/2018    Rona Krishnamurthy    MRN: [id number]           Patient Information     Date Of Birth          1980        Visit Information        Provider Department      7/13/2018 11:30 AM Salud Alfaro CNM [facility]        Today's Diagnoses     High-risk pregnancy, elderly primigravida - [facility] CNM    -  1    Gestational Hypertension- Per MD, IOL 37-39           Follow-ups after your visit        Follow-up notes from your care team     Return in about 1 week (around 7/20/2018) for MARISOL MANZANARES.      Your next 10 appointments already scheduled     Jul 17, 2018 10:00 AM CDT   (Arrive by 9:45 AM)   [facility]   [facility] ([facility])    [facility]  3rd Floor, [address]  [address] [place] [address] [phone]           Please bring a list of your medicines (including vitamins, minerals and over-the-counter drugs). Also, tell your doctor about any allergies you may have. Wear comfortable clothes and leave your valuables at home.  Drink four 8-ounce glasses of fluid an hour before your exam. If you need to empty your bladder before your exam, try to release only a little urine. Then, drink another glass of fluid.  You may have up to two family members in the exam room. If you bring a small child, an adult must be there to care for him or her. No video or camera photography during the procedure.  Please call the Imaging Department at your exam site with any questions.            Jul 17, 2018 10:45 AM CDT   RETURN OB with NITHIN Mil[facility]   [facility] ([facility])    [facility] [address]  [address]  [address] [place] [address] [phone]            Jul 20, 2018  1:00 PM CDT   ULTRASOUND with [facility] ULTRASOUND   [facility] ([facility]  "[facility])    [place] [facility] [address]  [address]  [address]  [facility] [id number] [phone]              Future tests that were ordered for you today     Open Future Orders        Priority Expected Expires Ordered    CBC with Platelets Routine  7/13/2019 7/13/2018            Who to contact     Please call your clinic at [phone] to:    Ask questions about your health    Make or cancel appointments    Discuss your medicines    Learn about your test results    Speak to your doctor            Additional Information About Your Visit        [employer] Information     [employer] gives you secure access to your electronic health record. If you see a primary care provider, you can also send messages to your care team and make appointments. If you have questions, please call your primary care clinic.  If you do not have a primary care provider, please call [phone] and they will assist you.      [employer] is an electronic gateway that provides easy, online access to your medical records. With [employer], you can request a clinic appointment, read your test results, renew a prescription or communicate with your care team.     To access your existing account, please contact your [facility] or call [phone] for assistance.        Care EveryWhere ID     This is your Care EveryWhere ID. This could be used by other organizations to access your [place] medical records  [id number]        Your Vitals Were     Pulse Height Last Period BMI (Body Mass Index)          85 1.575 m (5' 2.01\") 10/24/2017 33.88 kg/m2         Blood Pressure from Last 3 Encounters:   07/13/18 144/89   06/29/18 143/86   06/15/18 135/86    Weight from Last 3 Encounters:   07/13/18 84.1 kg (185 lb 4.8 oz)   06/29/18 84.6 kg (186 lb 9.6 oz)   06/15/18 82.9 kg (182 lb 12.8 oz)              We Performed the Following     Group B strep PCR        Primary Care Provider Office Phone # Fax #    Dahlia " Paula Noriega [phone]       [address] [place] [phone]  [facility] [address]        Equal Access to Services     BERT ACUNA : Hadii aad ku hadkatpeewee Prabhakar, myriameva deanmarniha, guadalupe kachristida jann, kim clementin hayaan praneethjaya waite nick bryan. So [facility] [phone].    ATENCIÓN: Si habla español, tiene a trevizo disposición servicios gratuitos de asistencia lingüística. Llame al [phone].    We comply with applicable federal civil rights laws and [place] laws. We do not discriminate on the basis of race, color, national origin, age, disability, sex, sexual orientation, or gender identity.            Thank you!     Thank you for choosing [facility]  for your care. Our goal is always to provide you with excellent care. Hearing back from our patients is one way we can continue to improve our services. Please take a few minutes to complete the written survey that you may receive in the mail after your visit with us. Thank you!             Your Updated Medication List - Protect others around you: Learn how to safely use, store and throw away your medicines at www.disposemymeds.org.          This list is accurate as of 7/13/18 11:59 PM.  Always use your most recent med list.                   Brand Name Dispense Instructions for use Diagnosis    acetone (Urine) test Strp     50 each    Test first voiding of the day.    Gestational diabetes       blood glucose monitoring test strip    no brand specified    200 strip    One Touch Verio test strips.  Test 6 times daily.    Gestational diabetes       breast pump Misc     1 each    1 each as needed    High-risk pregnancy, elderly primigravida       DiphenhydrAMINE HCl (Sleep) 50 MG Caps      50 mg as needed        L-THEANINE PO      Take 200 mg by mouth daily        prenatal multivitamin plus iron 27-0.8 MG Tabs per tablet      Take 1 tablet by mouth daily

## 2018-07-14 VITALS
SYSTOLIC BLOOD PRESSURE: 144 MMHG | HEIGHT: 62 IN | BODY MASS INDEX: 34.1 KG/M2 | WEIGHT: 185.3 LBS | DIASTOLIC BLOOD PRESSURE: 89 MMHG | HEART RATE: 85 BPM

## 2018-07-14 LAB
GP B STREP DNA SPEC QL NAA+PROBE: NEGATIVE
SPECIMEN SOURCE: NORMAL

## 2018-07-17 ENCOUNTER — OFFICE VISIT (OUTPATIENT)
Dept: OBGYN | Facility: CLINIC | Age: 38
End: 2018-07-17
Attending: ADVANCED PRACTICE MIDWIFE
Payer: COMMERCIAL

## 2018-07-17 ENCOUNTER — RADIANT APPOINTMENT (OUTPATIENT)
Dept: ULTRASOUND IMAGING | Facility: CLINIC | Age: 38
End: 2018-07-17
Attending: ADVANCED PRACTICE MIDWIFE
Payer: COMMERCIAL

## 2018-07-17 VITALS
DIASTOLIC BLOOD PRESSURE: 84 MMHG | SYSTOLIC BLOOD PRESSURE: 146 MMHG | HEART RATE: 90 BPM | WEIGHT: 185.3 LBS | BODY MASS INDEX: 33.88 KG/M2

## 2018-07-17 DIAGNOSIS — O13.3 GESTATIONAL HYPERTENSION, THIRD TRIMESTER: ICD-10-CM

## 2018-07-17 DIAGNOSIS — O09.519 HIGH-RISK PREGNANCY, ELDERLY PRIMIGRAVIDA: ICD-10-CM

## 2018-07-17 DIAGNOSIS — O09.519 HIGH-RISK PREGNANCY, ELDERLY PRIMIGRAVIDA: Primary | ICD-10-CM

## 2018-07-17 LAB
ALT SERPL W P-5'-P-CCNC: 21 U/L (ref 0–50)
AST SERPL W P-5'-P-CCNC: 18 U/L (ref 0–45)
CREAT UR-MCNC: 13 MG/DL
ERYTHROCYTE [DISTWIDTH] IN BLOOD BY AUTOMATED COUNT: 13.5 % (ref 10–15)
HCT VFR BLD AUTO: 36.4 % (ref 35–47)
HGB BLD-MCNC: 12 G/DL (ref 11.7–15.7)
MCH RBC QN AUTO: 29.1 PG (ref 26.5–33)
MCHC RBC AUTO-ENTMCNC: 33 G/DL (ref 31.5–36.5)
MCV RBC AUTO: 88 FL (ref 78–100)
PLATELET # BLD AUTO: 217 10E9/L (ref 150–450)
PROT UR-MCNC: <0.05 G/L
PROT/CREAT 24H UR: NORMAL G/G CR (ref 0–0.2)
RBC # BLD AUTO: 4.13 10E12/L (ref 3.8–5.2)
URATE SERPL-MCNC: 4.2 MG/DL (ref 2.6–6)
WBC # BLD AUTO: 8.7 10E9/L (ref 4–11)

## 2018-07-17 PROCEDURE — 84550 ASSAY OF BLOOD/URIC ACID: CPT | Performed by: ADVANCED PRACTICE MIDWIFE

## 2018-07-17 PROCEDURE — 36415 COLL VENOUS BLD VENIPUNCTURE: CPT | Performed by: ADVANCED PRACTICE MIDWIFE

## 2018-07-17 PROCEDURE — 84460 ALANINE AMINO (ALT) (SGPT): CPT | Performed by: ADVANCED PRACTICE MIDWIFE

## 2018-07-17 PROCEDURE — 84156 ASSAY OF PROTEIN URINE: CPT | Performed by: ADVANCED PRACTICE MIDWIFE

## 2018-07-17 PROCEDURE — 76819 FETAL BIOPHYS PROFIL W/O NST: CPT

## 2018-07-17 PROCEDURE — 84450 TRANSFERASE (AST) (SGOT): CPT | Performed by: ADVANCED PRACTICE MIDWIFE

## 2018-07-17 PROCEDURE — G0463 HOSPITAL OUTPT CLINIC VISIT: HCPCS | Mod: ZF

## 2018-07-17 PROCEDURE — 85027 COMPLETE CBC AUTOMATED: CPT | Performed by: ADVANCED PRACTICE MIDWIFE

## 2018-07-17 NOTE — LETTER
2018       RE: Rona Krishnamurthy  [address] [facility] [address]     Dear Colleague,    Thank you for referring your patient, Rona Krishnamurthy, to the [facility] at [facility]. Please see a copy of my visit note below.    Subjective:     37 year old  at 36w6d presents for a routine prenatal appointment.  Denies vaginal bleeding,  leakage of fluid, or change in vaginal discharge.  Denies contractions.  + fetal movement.       No HA, visual changes, RUQ or epigastric pain.   Patient concerns: feeling well overall.    - blood pressure: elevated 118-143/74-91; denies symptoms   - blood sugars- fasting values within normal limits, occasional elevated post-prandial, has not been walking as much after meals due to concerns about BP    Objective:  Vitals:    18 1057 18 1058 18 1059 18 1100   BP: 156/84 136/86 148/83 146/84   BP Location: Left arm Left arm Left arm Left arm   Patient Position: Chair Chair Chair Chair   Pulse:    90   Weight:    84.1 kg (185 lb 4.8 oz)   See OB flowsheet    Assessment/Plan:   Encounter Diagnosis   Name Primary?     High-risk pregnancy, elderly primigravida - [facility] CNM Yes     Orders Placed This Encounter   Procedures     ALT     AST     Protein  random urine with Creat Ratio     Uric acid     PHQ-9 SCORE 2018   Total Score 0     GBS screening: negative results  Labor signs discussed. Reinforced daily fetal movement counts.  Reviewed why/how to contact provider if headache/visual changes/RUQ or epigastric pain, decreased fetal movement, vaginal bleeding, leakage of fluid.  Discussed when to call for elevated blood pressures or pre-eclampsia symptoms.  Discussed induction process, length of induction, cervical ripening, Pitocin, Munoz score, etc.   Pt agreeable to repeat labs today  IOL scheduled for  at 0900  Continue twice weekly BPPs    Again, thank you for allowing me to  participate in the care of your patient.      Sincerely,    NITHIN Back CNM

## 2018-07-17 NOTE — PROGRESS NOTES
Subjective:     37 year old  at 36w6d presents for a routine prenatal appointment.  Denies vaginal bleeding,  leakage of fluid, or change in vaginal discharge.  Denies contractions.  + fetal movement.       No HA, visual changes, RUQ or epigastric pain.   Patient concerns: feeling well overall.    - blood pressure: elevated 118-143/74-91; denies symptoms   - blood sugars- fasting values within normal limits, occasional elevated post-prandial, has not been walking as much after meals due to concerns about BP    Objective:  Vitals:    18 1057 18 1058 18 1059 18 1100   BP: 156/84 136/86 148/83 146/84   BP Location: Left arm Left arm Left arm Left arm   Patient Position: Chair Chair Chair Chair   Pulse:    90   Weight:    84.1 kg (185 lb 4.8 oz)   See OB flowsheet    Assessment/Plan:   Encounter Diagnosis   Name Primary?     High-risk pregnancy, elderly primigravida - [facility] CNM Yes     Orders Placed This Encounter   Procedures     ALT     AST     Protein  random urine with Creat Ratio     Uric acid     PHQ-9 SCORE 2018   Total Score 0     GBS screening: negative results  Labor signs discussed. Reinforced daily fetal movement counts.  Reviewed why/how to contact provider if headache/visual changes/RUQ or epigastric pain, decreased fetal movement, vaginal bleeding, leakage of fluid.  Discussed when to call for elevated blood pressures or pre-eclampsia symptoms.  Discussed induction process, length of induction, cervical ripening, Pitocin, Munoz score, etc.   Pt agreeable to repeat labs today  IOL scheduled for  at 0900  Continue twice weekly BPPs

## 2018-07-17 NOTE — MR AVS SNAPSHOT
After Visit Summary   7/17/2018    Rona Krishnamurthy    MRN: [id number]           Patient Information     Date Of Birth          1980        Visit Information        Provider Department      7/17/2018 10:45 AM Manny Nicole APRN CN [facility]        Today's Diagnoses     High-risk pregnancy, elderly primigravida - S CNM    -  1       Follow-ups after your visit        Follow-up notes from your care team     Return in about 3 days (around 7/20/2018).      Your next 10 appointments already scheduled     Jul 20, 2018  1:00 PM CDT   (Arrive by 12:45 PM)   US FETAL BIOPHYS PROFILE W/O NON STRESS TEST with [id number]   [facility] ([facility])    [facility]  3rd Floor, [address]  [address] [place] [id number] [phone]           Please bring a list of your medicines (including vitamins, minerals and over-the-counter drugs). Also, tell your doctor about any allergies you may have. Wear comfortable clothes and leave your valuables at home.  Drink four 8-ounce glasses of fluid an hour before your exam. If you need to empty your bladder before your exam, try to release only a little urine. Then, drink another glass of fluid.  You may have up to two family members in the exam room. If you bring a small child, an adult must be there to care for him or her. No video or camera photography during the procedure.  Please call the Imaging Department at your exam site with any questions.              Future tests that were ordered for you today     Open Future Orders        Priority Expected Expires Ordered    BPP (Single) w/out NST Routine  7/17/2019 7/17/2018            Who to contact     Please call your clinic at [phone] to:    Ask questions about your health    Make or cancel appointments    Discuss your medicines    Learn about your test results    Speak to your doctor            Additional Information  About Your Visit        [employer] Information     [employer] gives you secure access to your electronic health record. If you see a primary care provider, you can also send messages to your care team and make appointments. If you have questions, please call your primary care clinic.  If you do not have a primary care provider, please call [phone] and they will assist you.      [employer] is an electronic gateway that provides easy, online access to your medical records. With [employer], you can request a clinic appointment, read your test results, renew a prescription or communicate with your care team.     To access your existing account, please contact your [facility] or call [phone] for assistance.        Care EveryWhere ID     This is your Care EveryWhere ID. This could be used by other organizations to access your [facility] medical records  [id number]        Your Vitals Were     Pulse Last Period BMI (Body Mass Index)             90 10/24/2017 33.88 kg/m2          Blood Pressure from Last 3 Encounters:   07/17/18 146/84   07/13/18 144/89   06/29/18 143/86    Weight from Last 3 Encounters:   07/17/18 84.1 kg (185 lb 4.8 oz)   07/13/18 84.1 kg (185 lb 4.8 oz)   06/29/18 84.6 kg (186 lb 9.6 oz)              We Performed the Following     ALT     AST     CBC with Platelets     Creatinine urine calculation only     Protein  random urine with Creat Ratio     Uric acid        Primary Care Provider Office Phone # Fax #    Dahlia Paula Bradley Noriega [phone]       [address] [place] [id number]        Equal Access to Services     [facility] : Hadii aad ku hadasho Solashawnali, waaxda luqadaha, qaybta kaalmada ademelvin, kim romero . So [facility] [phone].    ATENCIÓN: Si habla español, tiene a trevizo disposición servicios gratuitos de asistencia lingüística. Llame al [phone].    We comply with applicable federal civil  rights laws and [place] laws. We do not discriminate on the basis of race, color, national origin, age, disability, sex, sexual orientation, or gender identity.            Thank you!     Thank you for choosing [facility]  for your care. Our goal is always to provide you with excellent care. Hearing back from our patients is one way we can continue to improve our services. Please take a few minutes to complete the written survey that you may receive in the mail after your visit with us. Thank you!             Your Updated Medication List - Protect others around you: Learn how to safely use, store and throw away your medicines at www.disposemymeds.org.          This list is accurate as of 7/17/18 11:41 AM.  Always use your most recent med list.                   Brand Name Dispense Instructions for use Diagnosis    acetone (Urine) test Strp     50 each    Test first voiding of the day.    Gestational diabetes       blood glucose monitoring test strip    no brand specified    200 strip    One Touch Verio test strips.  Test 6 times daily.    Gestational diabetes       breast pump Misc     1 each    1 each as needed    High-risk pregnancy, elderly primigravida       DiphenhydrAMINE HCl (Sleep) 50 MG Caps      50 mg as needed        L-THEANINE PO      Take 200 mg by mouth daily        prenatal multivitamin plus iron 27-0.8 MG Tabs per tablet      Take 1 tablet by mouth daily

## 2018-07-19 ENCOUNTER — CARE COORDINATION (OUTPATIENT)
Dept: EDUCATION SERVICES | Facility: CLINIC | Age: 38
End: 2018-07-19

## 2018-07-19 ENCOUNTER — TELEPHONE (OUTPATIENT)
Dept: EDUCATION SERVICES | Facility: CLINIC | Age: 38
End: 2018-07-19

## 2018-07-19 DIAGNOSIS — O24.410 DIET CONTROLLED GESTATIONAL DIABETES MELLITUS (GDM) IN THIRD TRIMESTER: Primary | ICD-10-CM

## 2018-07-19 NOTE — PROGRESS NOTES
Diabetes Educator Note:    Tiffanie sends in her glucoses for the past couple of weeks.    She continues to have fasting blood glucoses in the target range of < 95.  Her post meal blood glucoses are in target range 85% of the time.  Of the remainder, only half of them were more than 3 points over target (140 an hour after eating).  These were related usually to not walking right after her meal, which she states that she has been doing pretty faithfully.      She states that induction is planned for next week.  BP has been elevated.    Encouraged to continue adhering to her meal plan and activity regimen, as she tolerates.        JAS JacksonN, RN, CDE  Diabetes   [facility] Room [address]  [address] [place] [address]  Email:  [email or web address]  Phone:  [phone]

## 2018-07-19 NOTE — TELEPHONE ENCOUNTER
Gestational Diabetes Follow-up    Subjective/Objective:    Rona Krishnamurthy sent in blood glucose log for review. Last date of communication was: 7/10.    Gestational diabetes is being managed with diet and activity    Taking diabetes medications: no    Estimated Date of Delivery: Aug 8, 2018    BG/Food Log:   Fasting glucose 90, 79, 81, 85, 82, 84  One hour after breakfast 145, 129, 128, 159, 145, 124  Lunch 118, 130, 118, 82, 135  Dinner 113, 173 (BBQ), 139, 131, 122    Assessment:    Ketones: negative.   Fasting blood glucoses: 100% in target.  After breakfast: 50% in target.  After lunch: 100% in target.  After dinner: 80% in target.    Note from Tiffanie:  Good Morning!  Here are my logs for the past week.  Still having an occasional high reading, but I feel like most of the time I can clearly attribute that to something - either lack of activity or trying something new eating out or at an event.      Regarding last weeks recommendations, I do take a fiber supplement that I hadnt been tracking but started to track this past week.  Its only 5 grams and I could intake even more, so I am thinking to start adding carl seeds with my yogurt.  I was doing that at the start but it elsy fell off. I am trying to limit my Smores cereal...I just discovered that cereal in the past couple weeks and it is SO good. :)  I know that will help.  For what its worth, my cinnamon raisin bread is 100% whole wheat, but I do understand it still has raisins and cinnamon in it, so its probably not the most perfect choice.  That being said, I really enjoy it so I am trying to make it work in my diet.     Ketones are still negative.      As of today we are thinking to induce on the 27th or 28th, so next week will probably be the last logs I send, unless I hear different.  I am so appreciative of all the help and guidance I have received from both of you.  It definitely hasnt been easy, but I have made some changes and will remain more  conscious about what I am eating after baby.    Thanks!!  Tiffanie :)    Plan/Response:  No changes in the patient's current treatment plan.    Any diabetes medication dose changes were made via the CDE Protocol and Collaborative Practice Agreement with the patient's referring provider. A copy of this encounter was shared with the provider.

## 2018-07-20 ENCOUNTER — MEDICAL CORRESPONDENCE (OUTPATIENT)
Dept: HEALTH INFORMATION MANAGEMENT | Facility: CLINIC | Age: 38
End: 2018-07-20

## 2018-07-20 ENCOUNTER — TELEPHONE (OUTPATIENT)
Dept: LAB | Facility: CLINIC | Age: 38
End: 2018-07-20

## 2018-07-20 ENCOUNTER — RADIANT APPOINTMENT (OUTPATIENT)
Dept: ULTRASOUND IMAGING | Facility: CLINIC | Age: 38
End: 2018-07-20
Attending: ADVANCED PRACTICE MIDWIFE
Payer: COMMERCIAL

## 2018-07-20 DIAGNOSIS — O13.3 PREGNANCY-INDUCED HYPERTENSION IN THIRD TRIMESTER: ICD-10-CM

## 2018-07-20 DIAGNOSIS — O09.519 HIGH-RISK PREGNANCY, ELDERLY PRIMIGRAVIDA: ICD-10-CM

## 2018-07-20 PROCEDURE — 76819 FETAL BIOPHYS PROFIL W/O NST: CPT

## 2018-07-20 NOTE — TELEPHONE ENCOUNTER
Rona calling to request new breast pump prescription with a different dx code. One given to her 6/29/18 her insurance won't cover.  She is coming to clinic next week for BPP so asked her to find out exact dx code her insurance needs and we can give her new prescription at that time.Pt indicated understanding and agreed with plan.

## 2018-07-23 ENCOUNTER — TELEPHONE (OUTPATIENT)
Dept: EDUCATION SERVICES | Facility: CLINIC | Age: 38
End: 2018-07-23

## 2018-07-23 DIAGNOSIS — O24.410 DIET CONTROLLED GESTATIONAL DIABETES MELLITUS (GDM) IN THIRD TRIMESTER: Primary | ICD-10-CM

## 2018-07-23 NOTE — TELEPHONE ENCOUNTER
Gestational Diabetes Follow-up    Subjective/Objective:    Rona Krishnamurthy sent in blood glucose log for review. Last date of communication was: 7/19.    Gestational diabetes is being managed with diet and activity    Taking diabetes medications: no    Estimated Date of Delivery: Aug 8, 2018    BG/Food Log:   Fasting glucose 79, 75, 74, 80  Post breakfast glucose 148, 142, 146, 141  Post lunch glucose 118, 118, 113  Post dinner glucose 122, 134, 111    Note from Tiffanie:  Good Afternoon!    Here are my final logs.  Definitely noticing a trend of my after breakfast readings, which aligns with what you all had mentioned about not tolerating things as well in the morning as in the evening...plus side is I think MOST of the high readings were in the error range of the meter (they were mainly all in the 140s). Which I am sure is NOT how I should be thinking, high is high, but...also in this last week I have really slowed down my activity, unfortunately (feeling pretty pregnant).  Still making the after dinner walk on most nights but not making the after breakfast walk and only sometimes after lunch. I am sure that has played a factor in any high readings as well.  Last Monday the 16th I wasnt feeling too hot, hence the higher readings that day as my activity was extremely low.  I think that about covers everything.  Scheduled to be induced on Friday at 9am.  I will check my blood sugar the day after delivery just to make sure its in the normal range.  If you can think of anything else I should do, please let me know.    Once again, thanks for all of your insight and guidance along this journey.  I couldntve done it without you both. I cant say its been fun (lol), but its been very insightful to how my life might be if I develop full blown diabetes which I know I have a elevated chance in doing.    I hope you two take care and have a great rest of the summer!!!    THANK YOU!!!  Tiffanie    Assessment:  Patient reports plan for  induction on Friday   Elevated glucose readings are related to decreased activity after breakfast this past week.     Plan/Response:  Encouraged patient to continue to follow meal plan and to get walks in as she is able.    Any diabetes medication dose changes were made via the CDE Protocol and Collaborative Practice Agreement with the patient's OB/GYN provider. A copy of this encounter was shared with the provider.

## 2018-07-24 ENCOUNTER — RADIANT APPOINTMENT (OUTPATIENT)
Dept: ULTRASOUND IMAGING | Facility: CLINIC | Age: 38
End: 2018-07-24
Attending: ADVANCED PRACTICE MIDWIFE
Payer: COMMERCIAL

## 2018-07-24 ENCOUNTER — TELEPHONE (OUTPATIENT)
Dept: OBGYN | Facility: CLINIC | Age: 38
End: 2018-07-24

## 2018-07-24 DIAGNOSIS — O09.519 HIGH-RISK PREGNANCY, ELDERLY PRIMIGRAVIDA: ICD-10-CM

## 2018-07-24 DIAGNOSIS — O13.3 PREGNANCY-INDUCED HYPERTENSION IN THIRD TRIMESTER: ICD-10-CM

## 2018-07-24 PROCEDURE — 76819 FETAL BIOPHYS PROFIL W/O NST: CPT

## 2018-07-24 RX ORDER — BREAST PUMP
1 EACH MISCELLANEOUS PRN
Qty: 1 EACH | Refills: 0 | Status: SHIPPED | OUTPATIENT
Start: 2018-07-24 | End: 2018-08-13

## 2018-07-24 NOTE — TELEPHONE ENCOUNTER
Breast pump re-ordered with dx code Tiffanie needed for insurance and given to Tiffanie in clinic today. Pt indicated understanding and agreed with plan.

## 2018-07-27 ENCOUNTER — HOSPITAL ENCOUNTER (INPATIENT)
Facility: CLINIC | Age: 38
LOS: 5 days | Discharge: HOME | End: 2018-08-01
Attending: MIDWIFE | Admitting: MIDWIFE
Payer: COMMERCIAL

## 2018-07-27 DIAGNOSIS — Z98.891 S/P CESAREAN SECTION: Primary | ICD-10-CM

## 2018-07-27 DIAGNOSIS — D62 ANEMIA DUE TO BLOOD LOSS, ACUTE: ICD-10-CM

## 2018-07-27 PROBLEM — R03.0 WHITE COAT SYNDROME WITHOUT HYPERTENSION: Status: RESOLVED | Noted: 2018-02-19 | Resolved: 2018-07-27

## 2018-07-27 LAB
ABO + RH BLD: NORMAL
ABO + RH BLD: NORMAL
ALT SERPL W P-5'-P-CCNC: 21 U/L (ref 0–50)
AMPHETAMINES UR QL SCN: NEGATIVE
ANION GAP SERPL CALCULATED.3IONS-SCNC: 10 MMOL/L (ref 3–14)
AST SERPL W P-5'-P-CCNC: 21 U/L (ref 0–45)
BASOPHILS # BLD AUTO: 0 10E9/L (ref 0–0.2)
BASOPHILS NFR BLD AUTO: 0.1 %
BLD GP AB SCN SERPL QL: NORMAL
BLOOD BANK CMNT PATIENT-IMP: NORMAL
BUN SERPL-MCNC: 8 MG/DL (ref 7–30)
CALCIUM SERPL-MCNC: 8.6 MG/DL (ref 8.5–10.1)
CANNABINOIDS UR QL: NEGATIVE
CHLORIDE SERPL-SCNC: 106 MMOL/L (ref 94–109)
CO2 SERPL-SCNC: 24 MMOL/L (ref 20–32)
COCAINE UR QL: NEGATIVE
CREAT SERPL-MCNC: 0.57 MG/DL (ref 0.52–1.04)
CREAT UR-MCNC: 75 MG/DL
DIFFERENTIAL METHOD BLD: ABNORMAL
EOSINOPHIL # BLD AUTO: 0 10E9/L (ref 0–0.7)
EOSINOPHIL NFR BLD AUTO: 0.2 %
ERYTHROCYTE [DISTWIDTH] IN BLOOD BY AUTOMATED COUNT: 13.2 % (ref 10–15)
GFR SERPL CREATININE-BSD FRML MDRD: >90 ML/MIN/1.7M2
GLUCOSE BLDC GLUCOMTR-MCNC: 105 MG/DL (ref 70–99)
GLUCOSE BLDC GLUCOMTR-MCNC: 110 MG/DL (ref 70–99)
GLUCOSE BLDC GLUCOMTR-MCNC: 94 MG/DL (ref 70–99)
GLUCOSE SERPL-MCNC: 100 MG/DL (ref 70–99)
HCT VFR BLD AUTO: 34.1 % (ref 35–47)
HGB BLD-MCNC: 11.4 G/DL (ref 11.7–15.7)
IMM GRANULOCYTES # BLD: 0 10E9/L (ref 0–0.4)
IMM GRANULOCYTES NFR BLD: 0.2 %
LYMPHOCYTES # BLD AUTO: 1.5 10E9/L (ref 0.8–5.3)
LYMPHOCYTES NFR BLD AUTO: 17.1 %
MCH RBC QN AUTO: 29 PG (ref 26.5–33)
MCHC RBC AUTO-ENTMCNC: 33.4 G/DL (ref 31.5–36.5)
MCV RBC AUTO: 87 FL (ref 78–100)
MONOCYTES # BLD AUTO: 0.5 10E9/L (ref 0–1.3)
MONOCYTES NFR BLD AUTO: 6.3 %
NEUTROPHILS # BLD AUTO: 6.6 10E9/L (ref 1.6–8.3)
NEUTROPHILS NFR BLD AUTO: 76.1 %
NRBC # BLD AUTO: 0 10*3/UL
NRBC BLD AUTO-RTO: 0 /100
OPIATES UR QL SCN: NEGATIVE
PCP UR QL SCN: NEGATIVE
PLATELET # BLD AUTO: 202 10E9/L (ref 150–450)
POTASSIUM SERPL-SCNC: 3.6 MMOL/L (ref 3.4–5.3)
PROT UR-MCNC: 0.09 G/L
PROT/CREAT 24H UR: 0.12 G/G CR (ref 0–0.2)
RBC # BLD AUTO: 3.93 10E12/L (ref 3.8–5.2)
SODIUM SERPL-SCNC: 140 MMOL/L (ref 133–144)
SPECIMEN EXP DATE BLD: NORMAL
URATE SERPL-MCNC: 4.4 MG/DL (ref 2.6–6)
WBC # BLD AUTO: 8.6 10E9/L (ref 4–11)

## 2018-07-27 PROCEDURE — 36415 COLL VENOUS BLD VENIPUNCTURE: CPT | Performed by: MIDWIFE

## 2018-07-27 PROCEDURE — 86850 RBC ANTIBODY SCREEN: CPT | Performed by: MIDWIFE

## 2018-07-27 PROCEDURE — 84460 ALANINE AMINO (ALT) (SGPT): CPT | Performed by: MIDWIFE

## 2018-07-27 PROCEDURE — 86900 BLOOD TYPING SEROLOGIC ABO: CPT | Performed by: MIDWIFE

## 2018-07-27 PROCEDURE — 84450 TRANSFERASE (AST) (SGOT): CPT | Performed by: MIDWIFE

## 2018-07-27 PROCEDURE — 86780 TREPONEMA PALLIDUM: CPT | Performed by: MIDWIFE

## 2018-07-27 PROCEDURE — 86901 BLOOD TYPING SEROLOGIC RH(D): CPT | Performed by: MIDWIFE

## 2018-07-27 PROCEDURE — 80307 DRUG TEST PRSMV CHEM ANLYZR: CPT | Performed by: MIDWIFE

## 2018-07-27 PROCEDURE — 25000132 ZZH RX MED GY IP 250 OP 250 PS 637: Performed by: MIDWIFE

## 2018-07-27 PROCEDURE — 80048 BASIC METABOLIC PNL TOTAL CA: CPT | Performed by: MIDWIFE

## 2018-07-27 PROCEDURE — 85025 COMPLETE CBC W/AUTO DIFF WBC: CPT | Performed by: MIDWIFE

## 2018-07-27 PROCEDURE — 12000030 ZZH R&B OB INTERMEDIATE UMMC

## 2018-07-27 PROCEDURE — 84156 ASSAY OF PROTEIN URINE: CPT | Performed by: MIDWIFE

## 2018-07-27 PROCEDURE — 00000146 ZZHCL STATISTIC GLUCOSE BY METER IP

## 2018-07-27 PROCEDURE — 3E0P7VZ INTRODUCTION OF HORMONE INTO FEMALE REPRODUCTIVE, VIA NATURAL OR ARTIFICIAL OPENING: ICD-10-PCS | Performed by: MIDWIFE

## 2018-07-27 PROCEDURE — 84550 ASSAY OF BLOOD/URIC ACID: CPT | Performed by: MIDWIFE

## 2018-07-27 RX ORDER — MISOPROSTOL 100 UG/1
25 TABLET ORAL
Status: DISCONTINUED | OUTPATIENT
Start: 2018-07-27 | End: 2018-07-27 | Stop reason: ALTCHOICE

## 2018-07-27 RX ORDER — CARBOPROST TROMETHAMINE 250 UG/ML
250 INJECTION, SOLUTION INTRAMUSCULAR
Status: DISCONTINUED | OUTPATIENT
Start: 2018-07-27 | End: 2018-08-01

## 2018-07-27 RX ORDER — OXYCODONE AND ACETAMINOPHEN 5; 325 MG/1; MG/1
1 TABLET ORAL
Status: DISCONTINUED | OUTPATIENT
Start: 2018-07-27 | End: 2018-07-30

## 2018-07-27 RX ORDER — MORPHINE SULFATE 10 MG/ML
10 INJECTION, SOLUTION INTRAMUSCULAR; INTRAVENOUS ONCE
Status: DISCONTINUED | OUTPATIENT
Start: 2018-07-27 | End: 2018-07-31

## 2018-07-27 RX ORDER — ACETAMINOPHEN 325 MG/1
650 TABLET ORAL EVERY 4 HOURS PRN
Status: DISCONTINUED | OUTPATIENT
Start: 2018-07-27 | End: 2018-07-29

## 2018-07-27 RX ORDER — OXYTOCIN/0.9 % SODIUM CHLORIDE 30/500 ML
100-340 PLASTIC BAG, INJECTION (ML) INTRAVENOUS CONTINUOUS PRN
Status: COMPLETED | OUTPATIENT
Start: 2018-07-27 | End: 2018-07-29

## 2018-07-27 RX ORDER — MISOPROSTOL 100 UG/1
25 TABLET ORAL EVERY 4 HOURS PRN
Status: DISCONTINUED | OUTPATIENT
Start: 2018-07-27 | End: 2018-07-28

## 2018-07-27 RX ORDER — FENTANYL CITRATE 50 UG/ML
50-100 INJECTION, SOLUTION INTRAMUSCULAR; INTRAVENOUS
Status: DISCONTINUED | OUTPATIENT
Start: 2018-07-27 | End: 2018-08-01

## 2018-07-27 RX ORDER — SODIUM CHLORIDE, SODIUM LACTATE, POTASSIUM CHLORIDE, CALCIUM CHLORIDE 600; 310; 30; 20 MG/100ML; MG/100ML; MG/100ML; MG/100ML
INJECTION, SOLUTION INTRAVENOUS CONTINUOUS
Status: DISCONTINUED | OUTPATIENT
Start: 2018-07-27 | End: 2018-08-01

## 2018-07-27 RX ORDER — OXYTOCIN 10 [USP'U]/ML
10 INJECTION, SOLUTION INTRAMUSCULAR; INTRAVENOUS
Status: DISCONTINUED | OUTPATIENT
Start: 2018-07-27 | End: 2018-08-01

## 2018-07-27 RX ORDER — METHYLERGONOVINE MALEATE 0.2 MG/ML
200 INJECTION INTRAVENOUS
Status: DISCONTINUED | OUTPATIENT
Start: 2018-07-27 | End: 2018-08-01

## 2018-07-27 RX ORDER — IBUPROFEN 800 MG/1
800 TABLET, FILM COATED ORAL
Status: DISCONTINUED | OUTPATIENT
Start: 2018-07-27 | End: 2018-07-29

## 2018-07-27 RX ORDER — HYDROXYZINE HYDROCHLORIDE 50 MG/1
150 TABLET, FILM COATED ORAL ONCE
Status: COMPLETED | OUTPATIENT
Start: 2018-07-27 | End: 2018-07-27

## 2018-07-27 RX ORDER — NALOXONE HYDROCHLORIDE 0.4 MG/ML
.1-.4 INJECTION, SOLUTION INTRAMUSCULAR; INTRAVENOUS; SUBCUTANEOUS
Status: DISCONTINUED | OUTPATIENT
Start: 2018-07-27 | End: 2018-07-29

## 2018-07-27 RX ORDER — ONDANSETRON 2 MG/ML
4 INJECTION INTRAMUSCULAR; INTRAVENOUS EVERY 6 HOURS PRN
Status: DISCONTINUED | OUTPATIENT
Start: 2018-07-27 | End: 2018-07-29

## 2018-07-27 RX ORDER — DEXTROSE MONOHYDRATE 25 G/50ML
25-50 INJECTION, SOLUTION INTRAVENOUS
Status: DISCONTINUED | OUTPATIENT
Start: 2018-07-27 | End: 2018-08-01

## 2018-07-27 RX ORDER — CALCIUM CARBONATE 500 MG/1
500 TABLET, CHEWABLE ORAL DAILY PRN
Status: DISCONTINUED | OUTPATIENT
Start: 2018-07-27 | End: 2018-07-28

## 2018-07-27 RX ORDER — NICOTINE POLACRILEX 4 MG
15-30 LOZENGE BUCCAL
Status: DISCONTINUED | OUTPATIENT
Start: 2018-07-27 | End: 2018-08-01

## 2018-07-27 RX ADMIN — Medication 25 MCG: at 20:01

## 2018-07-27 RX ADMIN — Medication 25 MCG: at 12:30

## 2018-07-27 RX ADMIN — Medication 25 MCG: at 16:04

## 2018-07-27 RX ADMIN — HYDROXYZINE HYDROCHLORIDE 150 MG: 50 TABLET, FILM COATED ORAL at 22:05

## 2018-07-27 RX ADMIN — Medication 25 MCG: at 22:01

## 2018-07-27 RX ADMIN — CALCIUM CARBONATE (ANTACID) CHEW TAB 500 MG 500 MG: 500 CHEW TAB at 23:55

## 2018-07-27 ASSESSMENT — ACTIVITIES OF DAILY LIVING (ADL)
TOILETING: 0-->INDEPENDENT
RETIRED_COMMUNICATION: 0-->UNDERSTANDS/COMMUNICATES WITHOUT DIFFICULTY
FALL_HISTORY_WITHIN_LAST_SIX_MONTHS: NO
BATHING: 0-->INDEPENDENT
TRANSFERRING: 0-->INDEPENDENT
RETIRED_EATING: 0-->INDEPENDENT
AMBULATION: 0-->INDEPENDENT
COGNITION: 0 - NO COGNITION ISSUES REPORTED
SWALLOWING: 0-->SWALLOWS FOODS/LIQUIDS WITHOUT DIFFICULTY
DRESS: 0-->INDEPENDENT

## 2018-07-27 NOTE — IP AVS SNAPSHOT
MRN:[id number]                      After Visit Summary   7/27/2018    Rona Krishnamurthy    MRN: [id number]           Thank you!     Thank you for choosing [facility] for your care. Our goal is always to provide you with excellent care. Hearing back from our patients is one way we can continue to improve our services. Please take a few minutes to complete the written survey that you may receive in the mail after you visit with us. Thank you!        Patient Information     Date Of Birth          1980        Designated Caregiver       Most Recent Value    Caregiver    Will someone help with your care after discharge? no      About your hospital stay     You were admitted on:  July 27, 2018 You last received care in the:  [facility]    You were discharged on:  August 1, 2018        Reason for your hospital stay       Maternity care                  Who to Call     For medical emergencies, please call 911.  For non-urgent questions about your medical care, please call your primary care provider or clinic, [phone]  For questions related to your surgery, please call your surgery clinic        Attending Provider     Provider Specialty    Nathalia Solorzano APRN CNM OB/Gyn    Anglea Davis APRN CNM [facility]wives Mahoney, Ce Holder MD OB/Gyn    Tonja, Demetra Moreno MD OB/Gyn       Primary Care Provider Office Phone # Fax #    Dahlia Paula Noriega [phone]      After Care Instructions     Activity       Review discharge instructions            Diet       Resume previous diet            Discharge Instructions - Gestational diabetic patients       Gestational diabetic patients to follow up for fasting blood sugar and 2 hour 75gm glucose load at 6 weeks postpartum.            Discharge Instructions - Hypertensive disorders patients       High Blood pressure patients to follow up in clinic or via home care within one week for a blood pressure check             Discharge Instructions - Postpartum visit       Schedule postpartum visit with your provider and return to clinic in 6 weeks.                  Follow-up Appointments     Follow Up and recommended labs and tests       Follow up in 1 week for a blood pressure check and incision check and in 6 weeks for postpartum visit with glucose test.                  Further instructions from your care team       Postop  Birth Instructions    Activity       Do not lift more than 10 pounds for 6 weeks after surgery.  Ask family and friends for help when you need it.    No driving until you have stopped taking your pain medications (usually two weeks after surgery).    No heavy exercise or activity for 6 weeks.  Don't do anything that will put a strain on your surgery site.    Don't strain when using the toilet.  Your care team may prescribe a stool softener if you have problems with your bowel movements.     To care for your incision:       Keep the incision clean and dry.    Do not soak your incision in water. No swimming or hot tubs until it has fully healed. You may soak in the bathtub if the water level is below your incision.    Do not use peroxide, gel, cream, lotion, or ointment on your incision.    Adjust your clothes to avoid pressure on your surgery site (check the elastic in your underwear for example).     You may see a small amount of clear or pink drainage and this is normal.  Check with your health care provider:       If the drainage increases or has an odor.    If the incision reddens, you have swelling, or develop a rash.    If you have increased pain and the medicine we prescribed doesn't help.    If you have a fever above 100.4 F (38 C) with or without chills when placing thermometer under your tongue.   The area around your incision (surgery wound), will feel numb.  This is normal. The numbness should go away in less than a year.     Keep your hands clean:  Always wash your hands before touching your  incision (surgery wound). This helps reduce your risk of infection. If your hands aren't dirty, you may use an alcohol hand-rub to clean your hands. Keep your nails clean and short.    Call your healthcare provider if you have any of these symptoms:       You soak a sanitary pad with blood within 1 hour, or you see blood clots larger than a golf ball.    Bleeding that lasts more than 6 weeks.    Vaginal discharge that smells bad.    Severe pain, cramping or tenderness in your lower belly area.    A need to urinate more frequently (use the toilet more often), more urgently (use the toilet very quickly), or it burns when you urinate.    Nausea and vomiting.    Redness, swelling or pain around a vein in your leg.    Problems breastfeeding or a red or painful area on your breast.    Chest pain and cough or are gasping for air.    Problems with coping with sadness, anxiety or depression. If you have concerns about hurting yourself or the baby, call your provider immediately.      You have questions or concerns after you return home.                  Pending Results     No orders found from 7/25/2018 to 7/28/2018.            Statement of Approval     Ordered          08/01/18 0835  I have reviewed and agree with all the recommendations and orders detailed in this document.  EFFECTIVE NOW     Approved and electronically signed by:  Shelby Pal MD             Admission Information     Date & Time Provider Department Dept. Phone    7/27/2018 Demetra Evangelista MD [facility] [phone]      Your Vitals Were     Blood Pressure Pulse Temperature Respirations Last Period Pulse Oximetry    115/74 86 97.8  F (36.6  C) (Oral) 16 10/24/2017 99%      MyChart Information     [employer] gives you secure access to your electronic health record. If you see a primary care provider, you can also send messages to your care team and make appointments. If you have questions, please call your primary care clinic.  If you do not have a  primary care provider, please call [phone] and they will assist you.        Care EveryWhere ID     This is your Care EveryWhere ID. This could be used by other organizations to access your [place] medical records  [id number]        Equal Access to Services     BERT ACUNA : Hadii aad ku hadkatpeewee Vanna, wajamilda luqadaha, qacharletteta kakristian praneethestefaníaeva, kim barakattristanlorie bryan. So [facility] [phone].    ATENCIÓN: Si habla español, tiene a trevizo disposición servicios gratuitos de asistencia lingüística. Llame al [phone].    We comply with applicable federal civil rights laws and [place] laws. We do not discriminate on the basis of race, color, national origin, age, disability, sex, sexual orientation, or gender identity.               Review of your medicines      START taking        Dose / Directions    acetaminophen 325 MG tablet   Commonly known as:  TYLENOL        Dose:  650 mg   Take 2 tablets (650 mg) by mouth every 4 hours as needed for other (multimodal surgical pain management along with NSAIDS and opioid medication as indicated based on pain control and physical function.)   Quantity:  40 tablet   Refills:  0       ferrous sulfate 325 (65 Fe) MG tablet   Commonly known as:  IRON   Used for:  Anemia due to blood loss, acute        Dose:  325 mg   Take 1 tablet (325 mg) by mouth daily (with breakfast)   Quantity:  45 tablet   Refills:  2       ibuprofen 600 MG tablet   Commonly known as:  ADVIL/MOTRIN        Dose:  600 mg   Take 1 tablet (600 mg) by mouth every 6 hours as needed for other (carmping)   Quantity:  30 tablet   Refills:  0       oxyCODONE IR 5 MG tablet   Commonly known as:  ROXICODONE        Dose:  5-10 mg   Take 1-2 tablets (5-10 mg) by mouth every 3 hours as needed for other (pain control or improvement in physical function. Hold dose for analgesic side effects.)   Quantity:  10 tablet   Refills:  0       senna-docusate 8.6-50 MG per tablet   Commonly known as:   SENOKOT-S;PERICOLACE        Dose:  1-2 tablet   Take 1-2 tablets by mouth 2 times daily as needed for constipation   Quantity:  40 tablet   Refills:  1         CONTINUE these medicines which have NOT CHANGED        Dose / Directions    acetone (Urine) test Strp   Used for:  Gestational diabetes        Test first voiding of the day.   Quantity:  50 each   Refills:  11       blood glucose monitoring test strip   Commonly known as:  no brand specified   Used for:  Gestational diabetes        One Touch Verio test strips.  Test 6 times daily.   Quantity:  200 strip   Refills:  11       * breast pump Misc   Used for:  High-risk pregnancy, elderly primigravida        Dose:  1 each   1 each as needed   Quantity:  1 each   Refills:  0       * breast pump Misc   Used for:  Postpartum care and examination of lactating mother        Dose:  1 each   1 each as needed   Quantity:  1 each   Refills:  0       prenatal multivitamin plus iron 27-0.8 MG Tabs per tablet        Dose:  1 tablet   Take 1 tablet by mouth daily   Refills:  0       * Notice:  This list has 2 medication(s) that are the same as other medications prescribed for you. Read the directions carefully, and ask your doctor or other care provider to review them with you.         Where to get your medicines      These medications were sent to [facility] [address]  [address] [address]     Phone:  [phone]     acetaminophen 325 MG tablet    ferrous sulfate 325 (65 Fe) MG tablet    ibuprofen 600 MG tablet    senna-docusate 8.6-50 MG per tablet         Some of these will need a paper prescription and others can be bought over the counter. Ask your nurse if you have questions.     Bring a paper prescription for each of these medications     oxyCODONE IR 5 MG tablet                Protect others around you: Learn how to safely use, store and throw away your medicines at www.disposemymeds.org.       "  Information about your nerve block     Today you received a block to numb the nerves near your surgery site.    This is a block using local anesthetic or \"numbing\" medication injected around the nerves to anesthetize or \"numb\" the area supplied by those nerves. This block is injected into the muscle layer near your surgical site. The type of anesthesia (Exparel) your anesthesia team used to numb your abdomen may give you relief for up to 72 hours.     Diet: There are no diet restrictions, but you should drink plenty of fluids, unless you are on a fluid-restricted diet.     Activity: If your surgical site is an arm or leg you should be careful with your affected limb, since it is possible to injure your limb without being aware of it due to the numbing. Until full feeling returns, you should guard against bumping or hitting your limb, and avoid extreme hot or cold temperatures on the skin.    Pain Medication: As the block wears off, the feeling will return as a tingling or prickly sensation near your surgical site. You will experience more discomfort from your incisions as the feeling returns. You may want to take a pain pill (a narcotic or Tylenol if this was prescribed by your surgeon) when you start to experience mild pain, before the pain becomes more severe. If your pain medications do not control your pain, you should notify your surgeon. If you are taking narcotics for pain management, do not drink alcohol, drive a car, or perform hazardous activities.  If you have questions or concerns you may call your surgeon at the number provided with your discharge instructions.     Call your surgeon if you experience blurry vision, ringing in the ears or metallic taste in your mouth.         Information about OPIOIDS     PRESCRIPTION OPIOIDS: WHAT YOU NEED TO KNOW   We gave you an opioid (narcotic) pain medicine. It is important to manage your pain, but opioids are not always the best choice. You should first try all " the other options your care team gave you. Take this medicine for as short a time (and as few doses) as possible.     These medicines have risks:    DO NOT drive when on new or higher doses of pain medicine. These medicines can affect your alertness and reaction times, and you could be arrested for driving under the influence (DUI). If you need to use opioids long-term, talk to your care team about driving.    DO NOT operate heave machinery    DO NOT do any other dangerous activities while taking these medicines.     DO NOT drink any alcohol while taking these medicines.      If the opioid prescribed includes acetaminophen, DO NOT take with any other medicines that contain acetaminophen. Read all labels carefully. Look for the word  acetaminophen  or  Tylenol.  Ask your pharmacist if you have questions or are unsure.    You can get addicted to pain medicines, especially if you have a history of addiction (chemical, alcohol or substance dependence). Talk to your care team about ways to reduce this risk.    Store your pills in a secure place, locked if possible. We will not replace any lost or stolen medicine. If you don t finish your medicine, please throw away (dispose) as directed by your pharmacist. The [place] Pollution Control Agency has more information about safe disposal: https://www.pca.[facility].[place].us/living-green/managing-unwanted-medications.     All opioids tend to cause constipation. Drink plenty of water and eat foods that have a lot of fiber, such as fruits, vegetables, prune juice, apple juice and high-fiber cereal. Take a laxative (Miralax, milk of magnesia, Colace, Senna) if you don t move your bowels at least every other day.              Medication List: This is a list of all your medications and when to take them. Check marks below indicate your daily home schedule. Keep this list as a reference.      Medications           Morning Afternoon Evening Bedtime As Needed    acetaminophen 325 MG tablet    Commonly known as:  TYLENOL   Take 2 tablets (650 mg) by mouth every 4 hours as needed for other (multimodal surgical pain management along with NSAIDS and opioid medication as indicated based on pain control and physical function.)   Last time this was given:  975 mg on 8/1/2018  8:24 AM                                acetone (Urine) test Strp   Test first voiding of the day.                                blood glucose monitoring test strip   Commonly known as:  no brand specified   One Touch Verio test strips.  Test 6 times daily.                                * breast pump Misc   1 each as needed                                * breast pump Misc   1 each as needed                                ferrous sulfate 325 (65 Fe) MG tablet   Commonly known as:  IRON   Take 1 tablet (325 mg) by mouth daily (with breakfast)                                ibuprofen 600 MG tablet   Commonly known as:  ADVIL/MOTRIN   Take 1 tablet (600 mg) by mouth every 6 hours as needed for other (carmping)   Last time this was given:  800 mg on 8/1/2018  8:24 AM                                oxyCODONE IR 5 MG tablet   Commonly known as:  ROXICODONE   Take 1-2 tablets (5-10 mg) by mouth every 3 hours as needed for other (pain control or improvement in physical function. Hold dose for analgesic side effects.)   Last time this was given:  5 mg on 8/1/2018  8:28 AM                                prenatal multivitamin plus iron 27-0.8 MG Tabs per tablet   Take 1 tablet by mouth daily                                senna-docusate 8.6-50 MG per tablet   Commonly known as:  SENOKOT-S;PERICOLACE   Take 1-2 tablets by mouth 2 times daily as needed for constipation   Last time this was given:  2 tablets on 8/1/2018  8:24 AM                                * Notice:  This list has 2 medication(s) that are the same as other medications prescribed for you. Read the directions carefully, and ask your doctor or other care provider to review them with  you.

## 2018-07-27 NOTE — PLAN OF CARE
Problem: Patient Care Overview  Goal: Plan of Care/Patient Progress Review  Outcome: No Change  Pt. VSS and afebrile. Pt. Received oral miso x1 for IOL due to GDM and GHTN. Pt. States that she is having cramping, pain managed well with movement and heat application. 's-140's cat. 1, ctx. q3-7 on toco. See flowsheet for more detail. Pt. Notified when to call out to RN, verbalized understanding and agreed to plan, report given to Yoana MARMOLEJO RN.

## 2018-07-27 NOTE — PROVIDER NOTIFICATION
07/27/18 1020   Provider Notification   Provider Name/Title Susan   Method of Notification Phone   Request Evaluate in Person   Notification Reason Patient Arrived   Provider notified of pt. Arrival, FHR, uterine activity, VS and pt. Report of last marijuana use 12/17. CNM to see pt. In person.

## 2018-07-27 NOTE — IP AVS SNAPSHOT
[facility]    [address] [address]    Phone:  [phone]                                       After Visit Summary   7/27/2018    Rona Krishnamurthy    MRN: [id number]           After Visit Summary Signature Page     I have received my discharge instructions, and my questions have been answered. I have discussed any challenges I see with this plan with the nurse or doctor.    ..........................................................................................................................................  Patient/Patient Representative Signature      ..........................................................................................................................................  Patient Representative Print Name and Relationship to Patient    ..................................................               ................................................  Date                                            Time    ..........................................................................................................................................  Reviewed by Signature/Title    ...................................................              ..............................................  Date                                                            Time

## 2018-07-28 LAB
GLUCOSE BLDC GLUCOMTR-MCNC: 106 MG/DL (ref 70–99)
GLUCOSE BLDC GLUCOMTR-MCNC: 118 MG/DL (ref 70–99)
GLUCOSE BLDC GLUCOMTR-MCNC: 131 MG/DL (ref 70–99)
T PALLIDUM AB SER QL: NONREACTIVE

## 2018-07-28 PROCEDURE — 25000125 ZZHC RX 250

## 2018-07-28 PROCEDURE — 25000132 ZZH RX MED GY IP 250 OP 250 PS 637: Performed by: MIDWIFE

## 2018-07-28 PROCEDURE — 25000125 ZZHC RX 250: Performed by: ADVANCED PRACTICE MIDWIFE

## 2018-07-28 PROCEDURE — 25000132 ZZH RX MED GY IP 250 OP 250 PS 637: Performed by: ADVANCED PRACTICE MIDWIFE

## 2018-07-28 PROCEDURE — 3E033VJ INTRODUCTION OF OTHER HORMONE INTO PERIPHERAL VEIN, PERCUTANEOUS APPROACH: ICD-10-PCS | Performed by: MIDWIFE

## 2018-07-28 PROCEDURE — 12000030 ZZH R&B OB INTERMEDIATE UMMC

## 2018-07-28 PROCEDURE — 00000146 ZZHCL STATISTIC GLUCOSE BY METER IP

## 2018-07-28 PROCEDURE — 25000128 H RX IP 250 OP 636

## 2018-07-28 RX ORDER — CALCIUM CARBONATE 500 MG/1
500 TABLET, CHEWABLE ORAL DAILY PRN
Status: DISCONTINUED | OUTPATIENT
Start: 2018-07-28 | End: 2018-08-01 | Stop reason: HOSPADM

## 2018-07-28 RX ORDER — MISOPROSTOL 100 UG/1
25 TABLET ORAL
Status: DISCONTINUED | OUTPATIENT
Start: 2018-07-28 | End: 2018-08-01

## 2018-07-28 RX ORDER — SODIUM CHLORIDE, SODIUM LACTATE, POTASSIUM CHLORIDE, CALCIUM CHLORIDE 600; 310; 30; 20 MG/100ML; MG/100ML; MG/100ML; MG/100ML
INJECTION, SOLUTION INTRAVENOUS
Status: COMPLETED
Start: 2018-07-28 | End: 2018-07-28

## 2018-07-28 RX ORDER — LIDOCAINE 40 MG/G
CREAM TOPICAL
Status: DISCONTINUED | OUTPATIENT
Start: 2018-07-28 | End: 2018-08-01

## 2018-07-28 RX ORDER — OXYTOCIN/0.9 % SODIUM CHLORIDE 30/500 ML
1-24 PLASTIC BAG, INJECTION (ML) INTRAVENOUS CONTINUOUS
Status: DISCONTINUED | OUTPATIENT
Start: 2018-07-28 | End: 2018-08-01

## 2018-07-28 RX ORDER — SODIUM CHLORIDE, SODIUM LACTATE, POTASSIUM CHLORIDE, CALCIUM CHLORIDE 600; 310; 30; 20 MG/100ML; MG/100ML; MG/100ML; MG/100ML
INJECTION, SOLUTION INTRAVENOUS CONTINUOUS
Status: DISCONTINUED | OUTPATIENT
Start: 2018-07-28 | End: 2018-08-01

## 2018-07-28 RX ORDER — OXYTOCIN/0.9 % SODIUM CHLORIDE 30/500 ML
PLASTIC BAG, INJECTION (ML) INTRAVENOUS
Status: COMPLETED
Start: 2018-07-28 | End: 2018-07-28

## 2018-07-28 RX ADMIN — OXYTOCIN-SODIUM CHLORIDE 0.9% IV SOLN 30 UNIT/500ML 1 MILLI-UNITS/MIN: 30-0.9/5 SOLUTION at 14:13

## 2018-07-28 RX ADMIN — Medication 25 MCG: at 09:06

## 2018-07-28 RX ADMIN — Medication 25 MCG: at 02:15

## 2018-07-28 RX ADMIN — Medication 25 MCG: at 11:04

## 2018-07-28 RX ADMIN — CALCIUM CARBONATE (ANTACID) CHEW TAB 500 MG 500 MG: 500 CHEW TAB at 22:17

## 2018-07-28 RX ADMIN — OXYTOCIN-SODIUM CHLORIDE 0.9% IV SOLN 30 UNIT/500ML 1 MILLI-UNITS/MIN: 30-0.9/5 SOLUTION at 20:00

## 2018-07-28 RX ADMIN — Medication 25 MCG: at 06:51

## 2018-07-28 RX ADMIN — SODIUM CHLORIDE, POTASSIUM CHLORIDE, SODIUM LACTATE AND CALCIUM CHLORIDE: 600; 310; 30; 20 INJECTION, SOLUTION INTRAVENOUS at 14:13

## 2018-07-28 NOTE — PLAN OF CARE
Problem: Labor (Cervical Ripen, Induct, Augment) (Adult,Obstetrics,Pediatric)  Goal: Signs and Symptoms of Listed Potential Problems Will be Absent, Minimized or Managed (Labor)  Signs and symptoms of listed potential problems will be absent, minimized or managed by discharge/transition of care (reference Labor (Cervical Ripen, Induct, Augment) (Adult,Obstetrics,Pediatric) CPG).   Outcome: No Change  Fetus tolerating induction.

## 2018-07-28 NOTE — PROGRESS NOTES
Labor progress note    S:  Pt has been sleeping with meds provided  Noting cramping     O:  Blood pressure 117/62, temperature 98.2  F (36.8  C), temperature source Oral, resp. rate 18, last menstrual period 10/24/2017, not currently breastfeeding.  General appearance: uncomfortable with contractions.  Contractions: Every 2-5 minutes. 60 seconds duration.  Palpate: mild.  FHT: Baseline 145 with mod* variability. Accelerations arepresent. no decelerations present.  ROM: no  miso given at 2200, 0200, and now at 0615 per vagina  #6  Pelvic exam: 2.70.-2}  Munoz Score: 6    Pitocin-none,  Antibiotics- none  Continue miso     A:  IUP @ 38w3d IOL Gest HTN GDM    Fetal Heart rate tracing Category category one  GBS- negative  Patient Active Problem List   Diagnosis     High-risk pregnancy, elderly primigravida - [facility] CNM     Elevated blood pressure reading without diagnosis of hypertension- per OB- treat as CHTN for pregnancy     Research study patient-check to see if patient has occlusive device when admitted for labor     Diet controlled gestational diabetes mellitus (GDM) in third trimester     Anemia during pregnancy in third trimester - Iron studies in process     Gestational Hypertension- Per MD, IOL 37-39     Labor and delivery indication for care or intervention         P:  comfort measures prn     cervical ripening with miso  Anticipate   MD consultant on call Dr Evangelista/ available prn     Angela Davis CNM APRN

## 2018-07-28 NOTE — PLAN OF CARE
Problem: Patient Care Overview  Goal: Plan of Care/Patient Progress Review  Outcome: No Change  VSS afebrile. Patient utilizing upright positioning to facilitate labor progress. Continued with PO miso and then vaginal miso at 2200 before bed.  at bedside for support. Denies needs. Will continue current plan of cares.

## 2018-07-28 NOTE — PROGRESS NOTES
[facility] Labor and Delivery Progress Note    Rona Krishnamurthy MRN# [id number]   Age: 37 year old YOB: 1980           Subjective:   Received vaginal misoprostol over night and then asked to have oral misoprostol this morning. 7 total doses of misoprostol.   Feels some cramping, but overall very comfortable.  in room for support.            Objective:   Patient Vitals for the past 8 hrs:   BP Temp Temp src Resp   18 1100 133/74 - - -   18 1025 117/76 - - -   18 0900 121/80 - - -   18 0650 137/79 98.4  F (36.9  C) Oral 18        Cervical Exam: 3-4 / 80 / -2      Position: posterior and soft  Munoz: 8     Membranes: Intact     Fetal Heart Rate:    Monitor: External US    Variability: Moderate. Accelerations present. No decelerations    Baseline Rate: 150 bpm   [place]: irregular contractions, mild           Assessment:   Rona Krishnamurthy is a 37 year old  who is 38w3d here for IOL  Indication: AMA at term, gestational HTN, GDM   Misoprostol x7 doses   Fetal Heart Rate Tracing: Category 1   GBS negative         Plan:   Discussed recommendation for pitocin induction of labor. Risks, benefits reviewed. Agreeable to plan.   Plans epidural when in active labor   Continue to monitor labor progress, FHR status  Comfort measures prn.  NITHIN Childress CNM

## 2018-07-28 NOTE — PROGRESS NOTES
Labor progress note    S:  Pt has been up walking changing positions spouse at her side     O:  Blood pressure 133/81, temperature 98.4  F (36.9  C), temperature source Oral, resp. rate 16, last menstrual period 10/24/2017, not currently breastfeeding.  General appearance: comfortable.  Contractions: Every 2-5 minutes. 60-90 seconds duration.  Palpate: cramping.  FHT: Baseline 145 with mod variability. Accelerations are present. no decelerations present.  ROM: not ruptured.   Pelvic exam: deferred    Pitocin- none,  Antibiotics- none      A:  IUP @ 38w2d   Fetal Heart rate tracing Category  one  GBS- negative  Patient Active Problem List   Diagnosis     High-risk pregnancy, elderly primigravida - [facility] CNM     White coat syndrome without hypertension     Elevated blood pressure reading without diagnosis of hypertension- per OB- treat as CHTN for pregnancy     Research study patient-check to see if patient has occlusive device when admitted for labor     Diet controlled gestational diabetes mellitus (GDM) in third trimester     Anemia during pregnancy in third trimester - Iron studies in process     Gestational Hypertension- Per MD, IOL 37-39     Labor and delivery indication for care or intervention         P:  cervical ripening with misoprostol  Anticipate   MD consultant on call Dr. Evangelista updated at safety rounds / available prn     Angela Davis  CNM APRN

## 2018-07-29 ENCOUNTER — SURGERY (OUTPATIENT)
Age: 38
End: 2018-07-29

## 2018-07-29 ENCOUNTER — ANESTHESIA EVENT (OUTPATIENT)
Dept: OBGYN | Facility: CLINIC | Age: 38
End: 2018-07-29
Payer: COMMERCIAL

## 2018-07-29 ENCOUNTER — ANESTHESIA (OUTPATIENT)
Dept: OBGYN | Facility: CLINIC | Age: 38
End: 2018-07-29
Payer: COMMERCIAL

## 2018-07-29 PROBLEM — Z98.891 S/P CESAREAN SECTION: Status: ACTIVE | Noted: 2018-07-29

## 2018-07-29 LAB
ERYTHROCYTE [DISTWIDTH] IN BLOOD BY AUTOMATED COUNT: 13 % (ref 10–15)
GLUCOSE BLDC GLUCOMTR-MCNC: 129 MG/DL (ref 70–99)
GLUCOSE BLDC GLUCOMTR-MCNC: 135 MG/DL (ref 70–99)
GLUCOSE BLDC GLUCOMTR-MCNC: 138 MG/DL (ref 70–99)
GLUCOSE BLDC GLUCOMTR-MCNC: 63 MG/DL (ref 70–99)
GLUCOSE BLDC GLUCOMTR-MCNC: 65 MG/DL (ref 70–99)
GLUCOSE BLDC GLUCOMTR-MCNC: 66 MG/DL (ref 70–99)
GLUCOSE BLDC GLUCOMTR-MCNC: 68 MG/DL (ref 70–99)
GLUCOSE BLDC GLUCOMTR-MCNC: 69 MG/DL (ref 70–99)
GLUCOSE BLDC GLUCOMTR-MCNC: 92 MG/DL (ref 70–99)
GLUCOSE BLDC GLUCOMTR-MCNC: 93 MG/DL (ref 70–99)
HCT VFR BLD AUTO: 33.8 % (ref 35–47)
HGB BLD-MCNC: 11.2 G/DL (ref 11.7–15.7)
MCH RBC QN AUTO: 28.7 PG (ref 26.5–33)
MCHC RBC AUTO-ENTMCNC: 33.1 G/DL (ref 31.5–36.5)
MCV RBC AUTO: 87 FL (ref 78–100)
PLATELET # BLD AUTO: 170 10E9/L (ref 150–450)
RBC # BLD AUTO: 3.9 10E12/L (ref 3.8–5.2)
WBC # BLD AUTO: 14 10E9/L (ref 4–11)

## 2018-07-29 PROCEDURE — 25000125 ZZHC RX 250: Performed by: MIDWIFE

## 2018-07-29 PROCEDURE — 85027 COMPLETE CBC AUTOMATED: CPT | Performed by: MIDWIFE

## 2018-07-29 PROCEDURE — 71000027 ZZH RECOVERY PHASE 2 EACH 15 MINS: Performed by: OBSTETRICS & GYNECOLOGY

## 2018-07-29 PROCEDURE — 00000146 ZZHCL STATISTIC GLUCOSE BY METER IP

## 2018-07-29 PROCEDURE — 12000030 ZZH R&B OB INTERMEDIATE UMMC

## 2018-07-29 PROCEDURE — 25000128 H RX IP 250 OP 636: Performed by: NURSE ANESTHETIST, CERTIFIED REGISTERED

## 2018-07-29 PROCEDURE — 40000170 ZZH STATISTIC PRE-PROCEDURE ASSESSMENT II: Performed by: OBSTETRICS & GYNECOLOGY

## 2018-07-29 PROCEDURE — 36000057 ZZH SURGERY LEVEL 3 1ST 30 MIN - UMMC: Performed by: OBSTETRICS & GYNECOLOGY

## 2018-07-29 PROCEDURE — 25000132 ZZH RX MED GY IP 250 OP 250 PS 637: Performed by: MIDWIFE

## 2018-07-29 PROCEDURE — 25000132 ZZH RX MED GY IP 250 OP 250 PS 637: Performed by: STUDENT IN AN ORGANIZED HEALTH CARE EDUCATION/TRAINING PROGRAM

## 2018-07-29 PROCEDURE — 25000132 ZZH RX MED GY IP 250 OP 250 PS 637: Performed by: ADVANCED PRACTICE MIDWIFE

## 2018-07-29 PROCEDURE — 27110028 ZZH OR GENERAL SUPPLY NON-STERILE: Performed by: OBSTETRICS & GYNECOLOGY

## 2018-07-29 PROCEDURE — 25000132 ZZH RX MED GY IP 250 OP 250 PS 637

## 2018-07-29 PROCEDURE — 25000128 H RX IP 250 OP 636: Performed by: STUDENT IN AN ORGANIZED HEALTH CARE EDUCATION/TRAINING PROGRAM

## 2018-07-29 PROCEDURE — 25000128 H RX IP 250 OP 636: Performed by: ANESTHESIOLOGY

## 2018-07-29 PROCEDURE — 25000125 ZZHC RX 250: Performed by: NURSE ANESTHETIST, CERTIFIED REGISTERED

## 2018-07-29 PROCEDURE — 25000128 H RX IP 250 OP 636: Performed by: MIDWIFE

## 2018-07-29 PROCEDURE — C9290 INJ, BUPIVACAINE LIPOSOME: HCPCS | Performed by: STUDENT IN AN ORGANIZED HEALTH CARE EDUCATION/TRAINING PROGRAM

## 2018-07-29 PROCEDURE — 25000125 ZZHC RX 250: Performed by: STUDENT IN AN ORGANIZED HEALTH CARE EDUCATION/TRAINING PROGRAM

## 2018-07-29 PROCEDURE — 37000009 ZZH ANESTHESIA TECHNICAL FEE, EACH ADDTL 15 MIN: Performed by: OBSTETRICS & GYNECOLOGY

## 2018-07-29 PROCEDURE — 37000008 ZZH ANESTHESIA TECHNICAL FEE, 1ST 30 MIN: Performed by: OBSTETRICS & GYNECOLOGY

## 2018-07-29 PROCEDURE — 25000128 H RX IP 250 OP 636: Performed by: ADVANCED PRACTICE MIDWIFE

## 2018-07-29 PROCEDURE — 36000059 ZZH SURGERY LEVEL 3 EA 15 ADDTL MIN UMMC: Performed by: OBSTETRICS & GYNECOLOGY

## 2018-07-29 PROCEDURE — 25000125 ZZHC RX 250: Performed by: ADVANCED PRACTICE MIDWIFE

## 2018-07-29 PROCEDURE — 10907ZC DRAINAGE OF AMNIOTIC FLUID, THERAPEUTIC FROM PRODUCTS OF CONCEPTION, VIA NATURAL OR ARTIFICIAL OPENING: ICD-10-PCS | Performed by: OBSTETRICS & GYNECOLOGY

## 2018-07-29 PROCEDURE — 36415 COLL VENOUS BLD VENIPUNCTURE: CPT | Performed by: MIDWIFE

## 2018-07-29 PROCEDURE — 25000128 H RX IP 250 OP 636: Performed by: OBSTETRICS & GYNECOLOGY

## 2018-07-29 PROCEDURE — 27210794 ZZH OR GENERAL SUPPLY STERILE: Performed by: OBSTETRICS & GYNECOLOGY

## 2018-07-29 PROCEDURE — 25000125 ZZHC RX 250: Performed by: ANESTHESIOLOGY

## 2018-07-29 PROCEDURE — 71000014 ZZH RECOVERY PHASE 1 LEVEL 2 FIRST HR: Performed by: OBSTETRICS & GYNECOLOGY

## 2018-07-29 RX ORDER — NALOXONE HYDROCHLORIDE 0.4 MG/ML
.1-.4 INJECTION, SOLUTION INTRAMUSCULAR; INTRAVENOUS; SUBCUTANEOUS
Status: DISCONTINUED | OUTPATIENT
Start: 2018-07-29 | End: 2018-07-29

## 2018-07-29 RX ORDER — MISOPROSTOL 200 UG/1
TABLET ORAL
Status: DISCONTINUED
Start: 2018-07-29 | End: 2018-07-29 | Stop reason: WASHOUT

## 2018-07-29 RX ORDER — HYDROXYZINE HYDROCHLORIDE 50 MG/1
100 TABLET, FILM COATED ORAL
Status: COMPLETED | OUTPATIENT
Start: 2018-07-29 | End: 2018-07-29

## 2018-07-29 RX ORDER — ACETAMINOPHEN 325 MG/1
975 TABLET ORAL EVERY 8 HOURS
Status: DISCONTINUED | OUTPATIENT
Start: 2018-07-29 | End: 2018-08-01 | Stop reason: HOSPADM

## 2018-07-29 RX ORDER — ACETAMINOPHEN 650 MG/1
1000 SUPPOSITORY RECTAL ONCE
Status: DISCONTINUED | OUTPATIENT
Start: 2018-07-29 | End: 2018-07-29 | Stop reason: HOSPADM

## 2018-07-29 RX ORDER — CITRIC ACID/SODIUM CITRATE 334-500MG
30 SOLUTION, ORAL ORAL
Status: DISCONTINUED | OUTPATIENT
Start: 2018-07-29 | End: 2018-07-29 | Stop reason: HOSPADM

## 2018-07-29 RX ORDER — LIDOCAINE 40 MG/G
CREAM TOPICAL
Status: DISCONTINUED | OUTPATIENT
Start: 2018-07-29 | End: 2018-08-01

## 2018-07-29 RX ORDER — IBUPROFEN 600 MG/1
600 TABLET, FILM COATED ORAL EVERY 6 HOURS PRN
Status: DISCONTINUED | OUTPATIENT
Start: 2018-07-29 | End: 2018-08-01 | Stop reason: HOSPADM

## 2018-07-29 RX ORDER — LANOLIN 100 %
OINTMENT (GRAM) TOPICAL
Status: DISCONTINUED | OUTPATIENT
Start: 2018-07-29 | End: 2018-08-01 | Stop reason: HOSPADM

## 2018-07-29 RX ORDER — IBUPROFEN 400 MG/1
800 TABLET, FILM COATED ORAL EVERY 6 HOURS PRN
Status: DISCONTINUED | OUTPATIENT
Start: 2018-07-29 | End: 2018-08-01 | Stop reason: HOSPADM

## 2018-07-29 RX ORDER — HYDROCORTISONE 2.5 %
CREAM (GRAM) TOPICAL 3 TIMES DAILY PRN
Status: DISCONTINUED | OUTPATIENT
Start: 2018-07-29 | End: 2018-08-01 | Stop reason: HOSPADM

## 2018-07-29 RX ORDER — OXYCODONE HYDROCHLORIDE 5 MG/1
5-10 TABLET ORAL
Status: DISCONTINUED | OUTPATIENT
Start: 2018-07-29 | End: 2018-08-01 | Stop reason: HOSPADM

## 2018-07-29 RX ORDER — DEXTROSE, SODIUM CHLORIDE, SODIUM LACTATE, POTASSIUM CHLORIDE, AND CALCIUM CHLORIDE 5; .6; .31; .03; .02 G/100ML; G/100ML; G/100ML; G/100ML; G/100ML
INJECTION, SOLUTION INTRAVENOUS CONTINUOUS
Status: DISCONTINUED | OUTPATIENT
Start: 2018-07-29 | End: 2018-08-01 | Stop reason: HOSPADM

## 2018-07-29 RX ORDER — CEFAZOLIN SODIUM 1 G/3ML
INJECTION, POWDER, FOR SOLUTION INTRAMUSCULAR; INTRAVENOUS PRN
Status: DISCONTINUED | OUTPATIENT
Start: 2018-07-29 | End: 2018-07-29

## 2018-07-29 RX ORDER — BISACODYL 10 MG
10 SUPPOSITORY, RECTAL RECTAL DAILY PRN
Status: DISCONTINUED | OUTPATIENT
Start: 2018-07-31 | End: 2018-08-01 | Stop reason: HOSPADM

## 2018-07-29 RX ORDER — HYDROMORPHONE HYDROCHLORIDE 1 MG/ML
.3-.5 INJECTION, SOLUTION INTRAMUSCULAR; INTRAVENOUS; SUBCUTANEOUS EVERY 5 MIN PRN
Status: CANCELLED | OUTPATIENT
Start: 2018-07-29

## 2018-07-29 RX ORDER — MORPHINE SULFATE 1 MG/ML
INJECTION, SOLUTION EPIDURAL; INTRATHECAL; INTRAVENOUS PRN
Status: DISCONTINUED | OUTPATIENT
Start: 2018-07-29 | End: 2018-07-29

## 2018-07-29 RX ORDER — MORPHINE SULFATE 1 MG/ML
3 INJECTION, SOLUTION EPIDURAL; INTRATHECAL; INTRAVENOUS ONCE
Status: DISCONTINUED | OUTPATIENT
Start: 2018-07-29 | End: 2018-07-31

## 2018-07-29 RX ORDER — OXYTOCIN/0.9 % SODIUM CHLORIDE 30/500 ML
PLASTIC BAG, INJECTION (ML) INTRAVENOUS
Status: DISCONTINUED
Start: 2018-07-29 | End: 2018-07-29 | Stop reason: WASHOUT

## 2018-07-29 RX ORDER — FENTANYL CITRATE 50 UG/ML
INJECTION, SOLUTION INTRAMUSCULAR; INTRAVENOUS PRN
Status: DISCONTINUED | OUTPATIENT
Start: 2018-07-29 | End: 2018-07-29

## 2018-07-29 RX ORDER — MORPHINE SULFATE 10 MG/ML
10 INJECTION, SOLUTION INTRAMUSCULAR; INTRAVENOUS
Status: COMPLETED | OUTPATIENT
Start: 2018-07-29 | End: 2018-07-29

## 2018-07-29 RX ORDER — EPHEDRINE SULFATE 50 MG/ML
5 INJECTION, SOLUTION INTRAMUSCULAR; INTRAVENOUS; SUBCUTANEOUS
Status: DISCONTINUED | OUTPATIENT
Start: 2018-07-29 | End: 2018-08-01 | Stop reason: HOSPADM

## 2018-07-29 RX ORDER — MORPHINE SULFATE 1 MG/ML
INJECTION, SOLUTION EPIDURAL; INTRATHECAL; INTRAVENOUS
Status: DISCONTINUED
Start: 2018-07-29 | End: 2018-07-29 | Stop reason: HOSPADM

## 2018-07-29 RX ORDER — OXYTOCIN 10 [USP'U]/ML
INJECTION, SOLUTION INTRAMUSCULAR; INTRAVENOUS
Status: DISCONTINUED
Start: 2018-07-29 | End: 2018-07-29 | Stop reason: HOSPADM

## 2018-07-29 RX ORDER — SODIUM CHLORIDE, SODIUM LACTATE, POTASSIUM CHLORIDE, CALCIUM CHLORIDE 600; 310; 30; 20 MG/100ML; MG/100ML; MG/100ML; MG/100ML
INJECTION, SOLUTION INTRAVENOUS CONTINUOUS
Status: CANCELLED | OUTPATIENT
Start: 2018-07-29

## 2018-07-29 RX ORDER — OXYTOCIN/0.9 % SODIUM CHLORIDE 30/500 ML
100 PLASTIC BAG, INJECTION (ML) INTRAVENOUS CONTINUOUS
Status: DISCONTINUED | OUTPATIENT
Start: 2018-07-29 | End: 2018-08-01 | Stop reason: HOSPADM

## 2018-07-29 RX ORDER — LIDOCAINE HCL/EPINEPHRINE/PF 2%-1:200K
VIAL (ML) INJECTION PRN
Status: DISCONTINUED | OUTPATIENT
Start: 2018-07-29 | End: 2018-07-29

## 2018-07-29 RX ORDER — BUPIVACAINE HYDROCHLORIDE 2.5 MG/ML
INJECTION, SOLUTION EPIDURAL; INFILTRATION; INTRACAUDAL PRN
Status: DISCONTINUED | OUTPATIENT
Start: 2018-07-29 | End: 2018-07-29

## 2018-07-29 RX ORDER — ACETAMINOPHEN 325 MG/1
650 TABLET ORAL EVERY 4 HOURS PRN
Status: DISCONTINUED | OUTPATIENT
Start: 2018-08-01 | End: 2018-08-01 | Stop reason: HOSPADM

## 2018-07-29 RX ORDER — ONDANSETRON 2 MG/ML
INJECTION INTRAMUSCULAR; INTRAVENOUS PRN
Status: DISCONTINUED | OUTPATIENT
Start: 2018-07-29 | End: 2018-07-29

## 2018-07-29 RX ORDER — ONDANSETRON 4 MG/1
4 TABLET, ORALLY DISINTEGRATING ORAL EVERY 30 MIN PRN
Status: CANCELLED | OUTPATIENT
Start: 2018-07-29

## 2018-07-29 RX ORDER — SODIUM CHLORIDE 9 MG/ML
INJECTION, SOLUTION INTRAVENOUS
Status: DISCONTINUED
Start: 2018-07-29 | End: 2018-07-29 | Stop reason: HOSPADM

## 2018-07-29 RX ORDER — EPHEDRINE SULFATE 50 MG/ML
5 INJECTION, SOLUTION INTRAMUSCULAR; INTRAVENOUS; SUBCUTANEOUS
Status: DISCONTINUED | OUTPATIENT
Start: 2018-07-29 | End: 2018-08-01

## 2018-07-29 RX ORDER — FENTANYL CITRATE 50 UG/ML
25-50 INJECTION, SOLUTION INTRAMUSCULAR; INTRAVENOUS
Status: CANCELLED | OUTPATIENT
Start: 2018-07-29

## 2018-07-29 RX ORDER — SODIUM CHLORIDE, SODIUM LACTATE, POTASSIUM CHLORIDE, CALCIUM CHLORIDE 600; 310; 30; 20 MG/100ML; MG/100ML; MG/100ML; MG/100ML
INJECTION, SOLUTION INTRAVENOUS CONTINUOUS
Status: DISCONTINUED | OUTPATIENT
Start: 2018-07-29 | End: 2018-07-29 | Stop reason: HOSPADM

## 2018-07-29 RX ORDER — SODIUM CHLORIDE 9 MG/ML
INJECTION, SOLUTION INTRAVENOUS CONTINUOUS
Status: DISCONTINUED | OUTPATIENT
Start: 2018-07-29 | End: 2018-08-01

## 2018-07-29 RX ORDER — LIDOCAINE HYDROCHLORIDE 10 MG/ML
INJECTION, SOLUTION EPIDURAL; INFILTRATION; INTRACAUDAL; PERINEURAL
Status: DISCONTINUED
Start: 2018-07-29 | End: 2018-07-29 | Stop reason: HOSPADM

## 2018-07-29 RX ORDER — SIMETHICONE 80 MG
80 TABLET,CHEWABLE ORAL 4 TIMES DAILY PRN
Status: DISCONTINUED | OUTPATIENT
Start: 2018-07-29 | End: 2018-08-01 | Stop reason: HOSPADM

## 2018-07-29 RX ORDER — DIPHENHYDRAMINE HCL 25 MG
25 CAPSULE ORAL EVERY 6 HOURS PRN
Status: DISCONTINUED | OUTPATIENT
Start: 2018-07-29 | End: 2018-08-01 | Stop reason: HOSPADM

## 2018-07-29 RX ORDER — LIDOCAINE HYDROCHLORIDE AND EPINEPHRINE 15; 5 MG/ML; UG/ML
3 INJECTION, SOLUTION EPIDURAL
Status: COMPLETED | OUTPATIENT
Start: 2018-07-29 | End: 2018-07-29

## 2018-07-29 RX ORDER — ONDANSETRON 2 MG/ML
4 INJECTION INTRAMUSCULAR; INTRAVENOUS EVERY 6 HOURS PRN
Status: DISCONTINUED | OUTPATIENT
Start: 2018-07-29 | End: 2018-08-01 | Stop reason: HOSPADM

## 2018-07-29 RX ORDER — NALBUPHINE HYDROCHLORIDE 10 MG/ML
2.5-5 INJECTION, SOLUTION INTRAMUSCULAR; INTRAVENOUS; SUBCUTANEOUS EVERY 6 HOURS PRN
Status: DISCONTINUED | OUTPATIENT
Start: 2018-07-29 | End: 2018-08-01 | Stop reason: HOSPADM

## 2018-07-29 RX ORDER — AMOXICILLIN 250 MG
2 CAPSULE ORAL 2 TIMES DAILY PRN
Status: DISCONTINUED | OUTPATIENT
Start: 2018-07-29 | End: 2018-08-01 | Stop reason: HOSPADM

## 2018-07-29 RX ORDER — CITRIC ACID/SODIUM CITRATE 334-500MG
SOLUTION, ORAL ORAL
Status: COMPLETED
Start: 2018-07-29 | End: 2018-07-29

## 2018-07-29 RX ORDER — NALOXONE HYDROCHLORIDE 0.4 MG/ML
.1-.4 INJECTION, SOLUTION INTRAMUSCULAR; INTRAVENOUS; SUBCUTANEOUS
Status: DISCONTINUED | OUTPATIENT
Start: 2018-07-29 | End: 2018-08-01 | Stop reason: HOSPADM

## 2018-07-29 RX ORDER — DEXTROSE MONOHYDRATE 25 G/50ML
25-50 INJECTION, SOLUTION INTRAVENOUS
Status: DISCONTINUED | OUTPATIENT
Start: 2018-07-29 | End: 2018-08-01

## 2018-07-29 RX ORDER — CEFAZOLIN SODIUM 2 G/100ML
2 INJECTION, SOLUTION INTRAVENOUS
Status: DISCONTINUED | OUTPATIENT
Start: 2018-07-29 | End: 2018-07-29 | Stop reason: HOSPADM

## 2018-07-29 RX ORDER — CEFAZOLIN SODIUM 1 G/3ML
1 INJECTION, POWDER, FOR SOLUTION INTRAMUSCULAR; INTRAVENOUS SEE ADMIN INSTRUCTIONS
Status: DISCONTINUED | OUTPATIENT
Start: 2018-07-29 | End: 2018-07-29 | Stop reason: HOSPADM

## 2018-07-29 RX ORDER — ONDANSETRON 2 MG/ML
4 INJECTION INTRAMUSCULAR; INTRAVENOUS EVERY 30 MIN PRN
Status: CANCELLED | OUTPATIENT
Start: 2018-07-29

## 2018-07-29 RX ORDER — OXYTOCIN/0.9 % SODIUM CHLORIDE 30/500 ML
340 PLASTIC BAG, INJECTION (ML) INTRAVENOUS CONTINUOUS PRN
Status: DISCONTINUED | OUTPATIENT
Start: 2018-07-29 | End: 2018-08-01 | Stop reason: HOSPADM

## 2018-07-29 RX ORDER — IBUPROFEN 400 MG/1
400 TABLET, FILM COATED ORAL EVERY 6 HOURS PRN
Status: DISCONTINUED | OUTPATIENT
Start: 2018-07-29 | End: 2018-08-01 | Stop reason: HOSPADM

## 2018-07-29 RX ORDER — AMOXICILLIN 250 MG
1 CAPSULE ORAL 2 TIMES DAILY PRN
Status: DISCONTINUED | OUTPATIENT
Start: 2018-07-29 | End: 2018-08-01 | Stop reason: HOSPADM

## 2018-07-29 RX ORDER — LIDOCAINE 40 MG/G
CREAM TOPICAL
Status: DISCONTINUED | OUTPATIENT
Start: 2018-07-29 | End: 2018-08-01 | Stop reason: HOSPADM

## 2018-07-29 RX ORDER — KETOROLAC TROMETHAMINE 30 MG/ML
30 INJECTION, SOLUTION INTRAMUSCULAR; INTRAVENOUS EVERY 6 HOURS
Status: COMPLETED | OUTPATIENT
Start: 2018-07-30 | End: 2018-07-30

## 2018-07-29 RX ORDER — PROCHLORPERAZINE 25 MG
25 SUPPOSITORY, RECTAL RECTAL EVERY 12 HOURS PRN
Status: DISCONTINUED | OUTPATIENT
Start: 2018-07-29 | End: 2018-08-01 | Stop reason: HOSPADM

## 2018-07-29 RX ORDER — KETOROLAC TROMETHAMINE 30 MG/ML
INJECTION, SOLUTION INTRAMUSCULAR; INTRAVENOUS PRN
Status: DISCONTINUED | OUTPATIENT
Start: 2018-07-29 | End: 2018-07-29

## 2018-07-29 RX ORDER — OXYTOCIN 10 [USP'U]/ML
10 INJECTION, SOLUTION INTRAMUSCULAR; INTRAVENOUS
Status: DISCONTINUED | OUTPATIENT
Start: 2018-07-29 | End: 2018-08-01 | Stop reason: HOSPADM

## 2018-07-29 RX ORDER — CEFAZOLIN SODIUM 2 G/100ML
INJECTION, SOLUTION INTRAVENOUS
Status: DISCONTINUED
Start: 2018-07-29 | End: 2018-07-29 | Stop reason: HOSPADM

## 2018-07-29 RX ORDER — OXYTOCIN/0.9 % SODIUM CHLORIDE 30/500 ML
PLASTIC BAG, INJECTION (ML) INTRAVENOUS
Status: DISCONTINUED
Start: 2018-07-29 | End: 2018-07-29 | Stop reason: HOSPADM

## 2018-07-29 RX ORDER — NICOTINE POLACRILEX 4 MG
15-30 LOZENGE BUCCAL
Status: DISCONTINUED | OUTPATIENT
Start: 2018-07-29 | End: 2018-08-01

## 2018-07-29 RX ORDER — DIPHENHYDRAMINE HYDROCHLORIDE 50 MG/ML
25 INJECTION INTRAMUSCULAR; INTRAVENOUS EVERY 6 HOURS PRN
Status: DISCONTINUED | OUTPATIENT
Start: 2018-07-29 | End: 2018-08-01 | Stop reason: HOSPADM

## 2018-07-29 RX ORDER — NALBUPHINE HYDROCHLORIDE 10 MG/ML
2.5-5 INJECTION, SOLUTION INTRAMUSCULAR; INTRAVENOUS; SUBCUTANEOUS EVERY 6 HOURS PRN
Status: DISCONTINUED | OUTPATIENT
Start: 2018-07-29 | End: 2018-07-29

## 2018-07-29 RX ORDER — MISOPROSTOL 200 UG/1
400 TABLET ORAL
Status: DISCONTINUED | OUTPATIENT
Start: 2018-07-29 | End: 2018-08-01 | Stop reason: HOSPADM

## 2018-07-29 RX ORDER — DEXTROSE, SODIUM CHLORIDE, SODIUM LACTATE, POTASSIUM CHLORIDE, AND CALCIUM CHLORIDE 5; .6; .31; .03; .02 G/100ML; G/100ML; G/100ML; G/100ML; G/100ML
INJECTION, SOLUTION INTRAVENOUS CONTINUOUS
Status: DISCONTINUED | OUTPATIENT
Start: 2018-07-29 | End: 2018-08-01

## 2018-07-29 RX ADMIN — OXYTOCIN-SODIUM CHLORIDE 0.9% IV SOLN 30 UNIT/500ML 1 MILLI-UNITS/MIN: 30-0.9/5 SOLUTION at 13:24

## 2018-07-29 RX ADMIN — HYDROXYZINE HYDROCHLORIDE 50 MG: 50 TABLET, FILM COATED ORAL at 02:14

## 2018-07-29 RX ADMIN — BUPIVACAINE 10 ML: 13.3 INJECTION, SUSPENSION, LIPOSOMAL INFILTRATION at 19:18

## 2018-07-29 RX ADMIN — ACETAMINOPHEN 975 MG: 325 TABLET, FILM COATED ORAL at 23:12

## 2018-07-29 RX ADMIN — Medication: at 09:03

## 2018-07-29 RX ADMIN — PHENYLEPHRINE HYDROCHLORIDE 100 MCG: 10 INJECTION, SOLUTION INTRAMUSCULAR; INTRAVENOUS; SUBCUTANEOUS at 18:00

## 2018-07-29 RX ADMIN — LIDOCAINE HYDROCHLORIDE,EPINEPHRINE BITARTRATE 5 ML: 20; .005 INJECTION, SOLUTION EPIDURAL; INFILTRATION; INTRACAUDAL; PERINEURAL at 17:33

## 2018-07-29 RX ADMIN — KETOROLAC TROMETHAMINE 30 MG: 30 INJECTION, SOLUTION INTRAMUSCULAR at 18:54

## 2018-07-29 RX ADMIN — SODIUM CHLORIDE, POTASSIUM CHLORIDE, SODIUM LACTATE AND CALCIUM CHLORIDE 1000 ML: 600; 310; 30; 20 INJECTION, SOLUTION INTRAVENOUS at 08:26

## 2018-07-29 RX ADMIN — DEXTROSE 15 G: 15 GEL ORAL at 10:07

## 2018-07-29 RX ADMIN — LIDOCAINE HYDROCHLORIDE,EPINEPHRINE BITARTRATE 5 ML: 20; .005 INJECTION, SOLUTION EPIDURAL; INFILTRATION; INTRACAUDAL; PERINEURAL at 17:26

## 2018-07-29 RX ADMIN — ONDANSETRON 4 MG: 2 INJECTION INTRAMUSCULAR; INTRAVENOUS at 18:02

## 2018-07-29 RX ADMIN — BUPIVACAINE 10 ML: 13.3 INJECTION, SUSPENSION, LIPOSOMAL INFILTRATION at 19:21

## 2018-07-29 RX ADMIN — LIDOCAINE HYDROCHLORIDE,EPINEPHRINE BITARTRATE 3 ML: 15; .005 INJECTION, SOLUTION EPIDURAL; INFILTRATION; INTRACAUDAL; PERINEURAL at 08:51

## 2018-07-29 RX ADMIN — Medication 10 ML/HR: at 08:58

## 2018-07-29 RX ADMIN — SODIUM CHLORIDE, SODIUM LACTATE, POTASSIUM CHLORIDE, CALCIUM CHLORIDE AND DEXTROSE MONOHYDRATE: 5; 600; 310; 30; 20 INJECTION, SOLUTION INTRAVENOUS at 17:32

## 2018-07-29 RX ADMIN — OXYTOCIN-SODIUM CHLORIDE 0.9% IV SOLN 30 UNIT/500ML 600 ML/HR: 30-0.9/5 SOLUTION at 18:01

## 2018-07-29 RX ADMIN — SODIUM CHLORIDE, POTASSIUM CHLORIDE, SODIUM LACTATE AND CALCIUM CHLORIDE: 600; 310; 30; 20 INJECTION, SOLUTION INTRAVENOUS at 03:55

## 2018-07-29 RX ADMIN — OXYTOCIN-SODIUM CHLORIDE 0.9% IV SOLN 30 UNIT/500ML 100 ML/HR: 30-0.9/5 SOLUTION at 19:24

## 2018-07-29 RX ADMIN — AZITHROMYCIN MONOHYDRATE 500 MG: 500 INJECTION, POWDER, LYOPHILIZED, FOR SOLUTION INTRAVENOUS at 17:50

## 2018-07-29 RX ADMIN — SODIUM CITRATE AND CITRIC ACID MONOHYDRATE 30 ML: 500; 334 SOLUTION ORAL at 17:22

## 2018-07-29 RX ADMIN — BUPIVACAINE HYDROCHLORIDE 10 ML: 2.5 INJECTION, SOLUTION EPIDURAL; INFILTRATION; INTRACAUDAL at 19:21

## 2018-07-29 RX ADMIN — CEFAZOLIN 2 G: 1 INJECTION, POWDER, FOR SOLUTION INTRAMUSCULAR; INTRAVENOUS at 17:46

## 2018-07-29 RX ADMIN — DEXTROSE 15 G: 15 GEL ORAL at 10:52

## 2018-07-29 RX ADMIN — MORPHINE SULFATE 10 MG: 10 INJECTION INTRAVENOUS at 02:19

## 2018-07-29 RX ADMIN — SODIUM CHLORIDE, POTASSIUM CHLORIDE, SODIUM LACTATE AND CALCIUM CHLORIDE: 600; 310; 30; 20 INJECTION, SOLUTION INTRAVENOUS at 19:40

## 2018-07-29 RX ADMIN — RANITIDINE 150 MG: 150 TABLET, FILM COATED ORAL at 05:10

## 2018-07-29 RX ADMIN — OXYTOCIN-SODIUM CHLORIDE 0.9% IV SOLN 30 UNIT/500ML 100 ML/HR: 30-0.9/5 SOLUTION at 20:21

## 2018-07-29 RX ADMIN — SODIUM CHLORIDE, SODIUM LACTATE, POTASSIUM CHLORIDE, CALCIUM CHLORIDE AND DEXTROSE MONOHYDRATE 500 ML: 5; 600; 310; 30; 20 INJECTION, SOLUTION INTRAVENOUS at 17:02

## 2018-07-29 RX ADMIN — BUPIVACAINE HYDROCHLORIDE 10 ML: 2.5 INJECTION, SOLUTION EPIDURAL; INFILTRATION; INTRACAUDAL at 19:18

## 2018-07-29 RX ADMIN — MORPHINE SULFATE 3 MG: 1 INJECTION, SOLUTION EPIDURAL; INTRATHECAL; INTRAVENOUS at 18:03

## 2018-07-29 RX ADMIN — FENTANYL CITRATE 100 MCG: 50 INJECTION, SOLUTION INTRAMUSCULAR; INTRAVENOUS at 18:02

## 2018-07-29 NOTE — PLAN OF CARE
Problem: Patient Care Overview  Goal: Plan of Care/Patient Progress Review  Outcome: Improving  Pt's blood glucose remained low in the 60s after 2 apple juice, saltine crackers and glucose gel x 1. FHTs also showing recurrent variable decels. Pt noted pressure with contractions that was unrelieved with epidural PCEA dose. Betty Davis CNM called to room to assess pt. Cervical exam showed pt was dilated to 9cm. Bladder emptied by straight cath. FSE was applied by CNM due to history of recurrent VDs. Cervix then found to be completely dilated. FHTs then showed prolonged decel with lamar to 80s. Dr. Evangelista called to room, pitocin discontinued, oxygen therapy started, IV fluid bolus and pt repositioned into multiple positions. Pt attempted to push x 1 with no fetal movement. BG checked again and was then 138. FHTs recovered to the 145-150s. IUPC placed by Dr. Evangelista with plan to start amnioinfusion, but resting tone showed level of 40-50, so was not started. Variable decels resolved and decision was made to allow pt to labor down with pitocin off. Will continue to closely monitor.

## 2018-07-29 NOTE — PROGRESS NOTES
[facility] Labor and Delivery Progress Note    Rona Krishnamurthy MRN# [id number]   Age: 37 year old YOB: 1980           Subjective:   Resting on right side. Left hip sore. Not really feeling contractions.  in room.            Objective:   Patient Vitals for the past 8 hrs:   BP Temp Temp src Resp   18 2305 129/62 99.1  F (37.3  C) Oral -   18 2215 125/63 - - -   18 2000 123/59 98.2  F (36.8  C) Oral 16   18 1845 133/69 - - -   18 1800 127/69 - - 18   18 1645 118/68 - - 18        Cervical Exam: 3.5 / 80 / -2  (no change from previous exam)     Membranes: Intact     Fetal Heart Rate:    Monitor: External US    Variability: Moderate. No decelerations. Accelerations present.    Baseline Rate: 145 bpm   [place]: contractions every 2-7 minutes, lasting  seconds. Mild.   Pitocin at 4mU           Assessment:   Rona Krishnamurthy is a 37 year old  who is 38w3d here for IOL  Indication: AMA at term, gestational HTN, GDM   Misoprostol x7 doses, now pitocin   Fetal Heart Rate Tracing: Category 1  GBS negative           Plan:   Continue with pitocin per protocol  May have medications for rest   Continue to monitor  NITHIN Childress CNM

## 2018-07-29 NOTE — PROVIDER NOTIFICATION
07/29/18 1658   Provider Notification   Provider Name/Title Maria Elena Evangelista and Latia   Method of Notification In Department   Request Evaluate - Remote   Notification Reason Lab/Diagnostic Study   BG 63 mg/dl. Per providers orders administer D5LR at continuous rate with 500 ml bolus to treat hypoglycemia.

## 2018-07-29 NOTE — PROGRESS NOTES
[facility] Labor and Delivery Progress Note    Rona Krishnamurthy MRN# [id number]   Age: 37 year old YOB: 1980           Subjective:   Called to room by RN to assess EFM strip. Rona feeling occasional cramping, coping well.  in room for support.            Objective:   Patient Vitals for the past 8 hrs:   BP Temp Temp src Resp   18 1845 133/69 - - -   18 1645 118/68 - - 18   18 1544 104/51 98  F (36.7  C) Oral 18   18 1413 135/79 97.9  F (36.6  C) Oral -      Cervical Exam: deferred     Membranes: Intact     Fetal Heart Rate:   Monitor: External US  Baseline Rate: 150 bpm   Variability: Minimal with late decelerations x3. IV fluid bolus initiated, O2 applied via mask, repositioned on right side. Pitocin turned off. Late decelerations resolved. Variability now moderate. No decelerations, no accelerations    [place]: contractions every 2-4 minutes, lasting 60 seconds. Mild to palpation.           Assessment:   Rona Krishnamurthy is a 37 year old  who is 38w3d here for IOL  Indication: AMA at term, gestational HTN, GDM   Misoprostol x7 doses   Fetal Heart Rate Tracing: Category 2  GBS negative           Plan:   Continue to monitor. Restart pitocin after category 1 strip for 30 minutes.   Comfort measures prn   OB intrapartum diabetes management when in active labor   Nathalia Solorzano, NITHIN MANZANARES

## 2018-07-29 NOTE — PROGRESS NOTES
Labor progress note    S:  Pt is comfortable not feeling urge to push.  Sometimes can tell when she has contractions     O:  Blood pressure 132/64, temperature 99.3  F (37.4  C), temperature source Oral, resp. rate 16, last menstrual period 10/24/2017, SpO2 100 %, not currently breastfeeding.   Pt has been rotating positions using peanut ball.  Dr. Evangelista at desk aware of pt and reviewing FHR    Pt has been laboring down x  2 hours   General appearance: comfortable.  Contractions: Every 4-5 minutes. 50-70 seconds duration.  Palpate: moderate. Contractions has spaced and gotten shorter over past 30-45 min.  FHT: Baseline 135 with mod  variability. Accelerations are present. no decelerations present now since 1115.  ROM: clear fluid.   Pelvic exam: deferred  Pitocin- none,  Antibiotics- none      A:  IUP @ 38w4d second stage labor   Fetal Heart rate tracing Category category one  GBS- negative  Patient Active Problem List   Diagnosis     High-risk pregnancy, elderly primigravida - [facility] CNM     Elevated blood pressure reading without diagnosis of hypertension- per OB- treat as CHTN for pregnancy     Research study patient-check to see if patient has occlusive device when admitted for labor     Diet controlled gestational diabetes mellitus (GDM) in third trimester     Anemia during pregnancy in third trimester - Iron studies in process     Gestational Hypertension- Per MD, IOL 37-39     Labor and delivery indication for care or intervention         P:  comfort measures prn   MD consultant on call Dr. Evangelista aware and closely monitoring   Will labor down 1 more hour or until urge to push is noted if sooner. Will restart IV pitocin now to facilitate labor / available prn  reevaluate in 1 hour/PRN   Couple is agreeable to plan  Monitor closely  IV pit aug restart continue position changes   Empty bladder as needed   Angela Davis

## 2018-07-29 NOTE — PROGRESS NOTES
Labor progress note    S:  Pt became much more uncomfortable within 2 contractions after AROM  Requesting epidural anesthesia       O:  Blood pressure 115/68, temperature 99.3  F (37.4  C), temperature source Oral, resp. rate 16, last menstrual period 10/24/2017, SpO2 97 %, not currently breastfeeding.  General appearance: uncomfortable with contractions prior to epidural within 20 min after receiving epidural pt appears comfortable and able to rest   Contractions: Every 3 minutes. 60-90 seconds duration.  Palpate: moderate.  FHT: Baseline 140 with mod variability. Accelerations are present. no decelerations present.  ROM: clear fluid.     Pitocin- 5 mu/min.,  Antibiotics- none  # Pain Assessment:  Current Pain Score 2018   Patient currently in pain? yes   Pain location Uterine   Pain descriptors -   - Rona is experiencing pain due to labor now comfortable with epidural . Pain management was discussed with Rona and her spouse and the plan was created in a collaborative fashion.  Rona's response to the current recommendations: engaged  - Please see the plan for pain management as documented above      A:  IUP @ 38w4d early labor   Fetal Heart rate tracing Category category one  GBS- negative  Patient Active Problem List   Diagnosis     High-risk pregnancy, elderly primigravida - [facility] CNM     Elevated blood pressure reading without diagnosis of hypertension- per OB- treat as CHTN for pregnancy     Research study patient-check to see if patient has occlusive device when admitted for labor     Diet controlled gestational diabetes mellitus (GDM) in third trimester     Anemia during pregnancy in third trimester - Iron studies in process     Gestational Hypertension- Per MD, IOL 37-39     Labor and delivery indication for care or intervention         P:  comfort measures prn   Pain medication epidural   Anticipate   MD consultant on call Dr. Evangelista updated at safety rounds and came to meet pt /  available prn  Labor augmentation with Pitocin  reevaluate in 2-4 hours/PRN     Angela Davis CNM APRN

## 2018-07-29 NOTE — PROVIDER NOTIFICATION
07/29/18 0925   Vitals   /77   Resp 16   SpO2 97 %   Oximeter Heart Rate 63 bpm   Provider Notification   Provider Name/Title Betty Davis CNM   Method of Notification In Department   Request Evaluate - Remote   Notification Reason Lab/Diagnostic Study  (notified of BG of 68)

## 2018-07-29 NOTE — PROVIDER NOTIFICATION
07/29/18 1107   Uterine Activity   Monitor [place]   Contraction Frequency (minutes) 3   Contraction Duration (seconds) 60-80   Contraction Quality Strong   Resting Tone Palpated Soft   FHR   Monitor FSE   Variability 6-25 BPM   Baseline Rate (Fetus A) 145 bpm   Baseline Classification Normal   Accelerations Not present   Decelerations PD   Vitals   SpO2 97 %   Oximeter Heart Rate 75 bpm   Oxygen Therapy   Flow (L/min) 15   O2 Device nonrebreather mask   Labor Care   Labor Interventions Intravenous fluid bolus;Oxygen therapy;Oxytocin infusion discontinued;Provider notified   Provider Notification   Provider Name/Title Dr. Evangelista   Method of Notification Electronic Page   Request Evaluate in Person   Notification Reason Decels  (called to room per Betty Davis d/t prolonged decel)

## 2018-07-29 NOTE — PROGRESS NOTES
Labor progress note    S:  Pt has been trying to rest sleep not uncomfortable with contractions      O:  Blood pressure 115/68, temperature 99.3  F (37.4  C), temperature source Oral, resp. rate 16, last menstrual period 10/24/2017, SpO2 97 %, not currently breastfeeding.  General appearance: comfortable.  Contractions: Every 2-8 minutes. 50 seconds duration.  Palpate: mild.  FHT: Baseline 135 with mod variability. Accelerations are present. no decelerations currently  present.  ROM: not ruptured.  Pelvic exam:  Pitocin- 4 mu/min.,  Antibiotics- none  # Pain Assessment:  Current Pain Score 2018   Patient currently in pain? yes   Pain location Uterine   Pain descriptors -   - Rona is not experiencing pain . Pain management was discussed with Rona and her spouse and the plan was created in a collaborative fashion.  Rona's response to the current recommendations: engaged  - Please see the plan for pain management as documented above      A:  IUP @ 38w4d early labor  IOL for gestational HTN, GDM   Fetal Heart rate tracing Category category one  GBS- negative  Patient Active Problem List   Diagnosis     High-risk pregnancy, elderly primigravida - [facility] CNM     Elevated blood pressure reading without diagnosis of hypertension- per OB- treat as CHTN for pregnancy     Research study patient-check to see if patient has occlusive device when admitted for labor     Diet controlled gestational diabetes mellitus (GDM) in third trimester     Anemia during pregnancy in third trimester - Iron studies in process     Gestational Hypertension- Per MD, IOL 37-39     Labor and delivery indication for care or intervention         P:  comfort measures prn  Reviewed course with pt advised to consider AROM to facilitate labor process pt request defer until 0815 will try to sleep 1 more hour   Anticipate   MD consultant on call Dr. Evangelista  / available prn     Angela ALVAREZM APRN

## 2018-07-29 NOTE — PROVIDER NOTIFICATION
07/28/18 1820   Provider Notification   Provider Name/Title ISAURA Solorzano CNM   Method of Notification In Department   Request Evaluate in Person   Notification Reason Decels   Patient sitting upright in bed. Repetitive decels x3 to 60-70.  Position change to R lateral, fluid bolus, pitocin stopped, ISAURA Solorzano notified and at BS to evaluate.  Strip returned to baseline 150's after 10 minutes.

## 2018-07-29 NOTE — PROVIDER NOTIFICATION
07/29/18 0258   Provider Notification   Provider Name/Title C Jeanine   Method of Notification Phone   C Jeanine on the phone due to FYI page of decels despite repositioning. Will continue to monitor and update provider. Will increase pitocin when mod variability returns and no decels.

## 2018-07-29 NOTE — PROGRESS NOTES
Labor progress note    S:  Pt is comfortable with epidural  RN called CNM to room due to low blood sugars fetal heart rate concerns     O:  Blood pressure 132/64, temperature 99.3  F (37.4  C), temperature source Oral, resp. rate 16, last menstrual period 10/24/2017, SpO2 99 %, not currently breastfeeding.   BS returned to 138 after treatment with juice, glucose from 60s see RN notes   General appearance: comfortable.  Contractions: Every 3 minutes. 60seconds duration.  Palpate: strong.  FHT: Baseline 145  with mod variability. Accelerations not present. Variable  decelerations present.  ROM: clear fluid.   Pelvic exam:SVE pt noted to have ant lip at 1040 vtx at -1 stat.    1100 reexam with deep  variable  decels and applying FSE noted to be complete 0 stat MD called to room for deep decels IV pit turned off, IV fluid bolus started O2 mask placed on 100% on.    Pitocin- off with decels ,  Antibiotics- none  Dr. Evangelista in room  Attempted push with 1 contraction  Pt not moving VTX noted at +1 per Dr. Evangelista  See MD note  IUPC placed  fhr returned to 145 baseline with minimal variabiltiy     A:  IUP @ 38w4d second stage labor   Fetal Heart rate tracing Category  two  GBS- negative  Patient Active Problem List   Diagnosis     High-risk pregnancy, elderly primigravida - [facility] CNM     Elevated blood pressure reading without diagnosis of hypertension- per OB- treat as CHTN for pregnancy     Research study patient-check to see if patient has occlusive device when admitted for labor     Diet controlled gestational diabetes mellitus (GDM) in third trimester     Anemia during pregnancy in third trimester - Iron studies in process     Gestational Hypertension- Per MD, IOL 37-39     Labor and delivery indication for care or intervention         P:  comfort measures prn repositioned with peanut ball    Close monitoring of FHR   Anticipate   MD consultant on call Dr. Evangelista in room assessing see MD note / available prn     Angela  Reinisch CNM APRN

## 2018-07-29 NOTE — DISCHARGE SUMMARY
[facility] Discharge Summary    Rona Krishnamurthy MRN# [id number]   Age: 37 year old YOB: 1980     Date of Admission:  2018  Date of Discharge:  2018   Admitting Provider:  NITHIN Nicole CN  Discharge Physician:  Shelby Pal MD    Admit Dx:   -  at 38w2d  - gHTN  - gDMA1  - anemia    Discharge Dx:  - Same as above, s/p primary low transverse  section  - Arrest of descent  - category II FHT, RFD  - Acute blood loss anemia secondary to surgical blood loss, expected and asymptomatic    Procedures:  - Primray low transverse  section with double layer uterine closure via Pfannenstiel incision  - Spinal analgesia    Admit HPI:  Rona Krishnamurthy is a 37 year old female with an Patient's last menstrual period was 10/24/2017. and Estimated Date of Delivery: Aug 8, 2018 [facility] at 11:25 AM with for induction of labor.  Indication: AMA at term, gestational hypertension and GDM.   Please see her admit H&P for full details of her PMH, PSH, Meds, Allergies and exam on admit.    Operative Course:  Surgery was complicated by a 5 cm left cervical extension. EBL from the delivery was 1120 mL. Please see her  Section Operative Note for full details regarding her ddelivery.    Operative Findings: Vigorous female infant born on 11/3/17 at 1800 by  section. APGARs 9&9, weight 3118g. Cephalic presentation, ROP position. Clear amniotic fluid. No nuchal cord. Placenta delivered intact with 3-vessel cord. Normal appearing uterus, ovaries and fallopian tubes. No abdominal wall or intraabdominal wall adhesions. A 5 cm left cervical extension was noted and repaired.     Postoperative Course:  Her postoperative course was uncomplicated. On POD#3, she was meeting all of her postpartum goals and deemed stable for discharge. She was voiding without difficulty, tolerating a regular diet without nausea and vomiting, her  pain was well controlled on oral pain medicines and her lochia was appropriate. Her hemoglobin prior to delivery was 11.2 and after delivery was 8.6 She was asymptomatic and was discharged to home with PO iron. Her Rh status was positive and Rhogam was not indicated. She is pumping for baby in the NICU and plans to breastfeed    Discharge Medications:   Rona Krishnamurthy   Home Medication Instructions SHILPA:[id number]    Printed on:07/31/18 3812   Medication Information                      acetaminophen (TYLENOL) 325 MG tablet  Take 2 tablets (650 mg) by mouth every 4 hours as needed for other (multimodal surgical pain management along with NSAIDS and opioid medication as indicated based on pain control and physical function.)             acetone, Urine, test STRP  Test first voiding of the day.             blood glucose monitoring (NO BRAND SPECIFIED) test strip  One Touch Verio test strips.  Test 6 times daily.             ferrous sulfate (IRON) 325 (65 Fe) MG tablet  Take 1 tablet (325 mg) by mouth daily (with breakfast)             ibuprofen (ADVIL/MOTRIN) 600 MG tablet  Take 1 tablet (600 mg) by mouth every 6 hours as needed for other (carmping)             Misc. Devices (BREAST PUMP) MISC  1 each as needed             Misc. Devices (BREAST PUMP) MISC  1 each as needed             oxyCODONE IR (ROXICODONE) 5 MG tablet  Take 1-2 tablets (5-10 mg) by mouth every 3 hours as needed for other (pain control or improvement in physical function. Hold dose for analgesic side effects.)             Prenatal Vit-Fe Fumarate-FA (PRENATAL MULTIVITAMIN PLUS IRON) 27-0.8 MG TABS per tablet  Take 1 tablet by mouth daily             senna-docusate (SENOKOT-S;PERICOLACE) 8.6-50 MG per tablet  Take 1-2 tablets by mouth 2 times daily as needed for constipation                   Discharge/Disposition:  Rona Krishnamurthy was discharged to home in stable condition with the following instructions/medications:  1) Call for temperature >  100.4, bright red vaginal bleeding >1 pad an hour x 2 hours, foul smelling vaginal discharge, pain not controlled by usual oral pain meds, persistent nausea and vomiting not controlled on medications, drainage or redness from incision site  2) She was undecided on contraception but planned to discuss at her 6 week postpartum visit.  3) For feeding she decided to breastfeed and is currently pumping for baby in the NICU.  4) She was instructed to follow-up with her primary OB in 1 week for a blood pressure check and in 6 weeks for a postpartum visit with 2h GTT.  5) Discharge activity:  No heavy lifting >15 lbs or strenuous activity for 6 weeks, pelvic rest for 6 weeks, no driving or operating machinery while on narcotics.    # Discharge Pain Plan:   - During her hospitalization, Rona experienced pain due to  delivery.  The pain plan for discharge was discussed with Rona and the plan was created in a collaborative fashion.    - Opioids prescribed on discharge: oxycodone, 5 mg q3h PRN  - Duration of opioids after discharge: Per CDC opioid prescribing guidelines, a 3 day prescription of opioids was provided.  - Bowel regimen: senna  - Pharmacologic adjuvants:  NSAIDs and Acetaminophen  - Non-pharmacologic adjuvants: Heat and Ice  - Follow-up: 6 weeks for postpartum visit, patient instructed to call if severe pain at home not managed by PO pain medications    Rona Schmitz MD  OB/GYN, PGY2  18    Staff MD Note    I evaluated the patient on the day of discharge.  We reviewed discharge instructions.  Patient stable for discharge.    Shelby Pal MD

## 2018-07-29 NOTE — PLAN OF CARE
Problem: Patient Care Overview  Goal: Plan of Care/Patient Progress Review  Outcome: Improving  Decision made with pt to perform AROM to progress labor. AROM performed by Dr. Smith with Dr. Evangelista and Betty Davis CNM at bedside. Pt became much more uncomfortable with contractions after AROM and requested epidural. Epidural placed by Dr. Hooper without complications. Pt experiencing relief from contraction pain after epidural placed. Blood glucose checked and found to be 68. Pt given apple juice and Betty Davis updated. Rechecked 15 minutes later and BG was then 65. Second apple juice given and saltine crackers. Will recheck again in 15 minutes. FHTs with late decel x 2 after epidural with pt in semi-fowlers position. Repositioned to LT lateral and will continue to closely monitor FHTs.

## 2018-07-29 NOTE — PROVIDER NOTIFICATION
07/29/18 1034   Provider Notification   Provider Name/Title Betty Davis CNM   Method of Notification Electronic Page   Request Evaluate in Person   Notification Reason Decels;Labor Status;Lab/Diagnostic Study  (paged about low BGs, VDs and pressure)

## 2018-07-29 NOTE — PROGRESS NOTES
Called to patient's room re: decels    Upon my arrival, patient about 4 minutes into decel to 80-90s. Being changed to L lateral position with O2 on, fluid bolus given and pitocin already off. On cervical exam, 10/100/+1 and baby then came back up to baseline of 145 for a few minutes, then one more deep variable to 80s before returning to baseline of 150. Over the course of several minutes, tracing remained baseline of 150s. Placed IUPC catheter for possible amnioinfusion, but resting tone remained high (60s) and so amnioinfusion not started. IUPC remains in place to help determine timing of contractions, but I am not sure how reliable it is with true uterine tone/calculating MVUs.     Baseline is currently 150s and return of moderate variability with no further decelerations. Discussed with patient that variables/loss of variability could have been due to very rapid labor progress/fetal distress, but currently tracing is back to CAT1. Recommend pitocin rest for 30 more minutes and if variability continues to be reassuring, may restart pitocin if needed to increase contraction pattern.     Patient/ agreeable with plan which was also discussed with GLENNA Davis who was present during exam as well.     Demetra Evangelista MD  7/29/2018  11:57 AM

## 2018-07-29 NOTE — OP NOTE
[facility]  Full Operative Progress Note     Surgery Date:             2018  Surgeon:                     Demetra Evangelista MD  Assistants:                 MD Mónica Oviedo MD PhD PGY-3                                       Kianna Baldwin MD PGY-2                                      Rhonda Rene MD PGY-1     Pre-op Diagnosis:                     -  at 38w4d  - Arrest of descent  - Category II fetal heart tracing remote from delivery  - gestational hypertension  - gDMA1     Post-op Diagnosis:                   - Same as above    Procedure:  Primary low-transverse  section with double layer uterine closure via Pfannensteil incision    Anesthesia:                Spinal  EBL: 1120 mL  IVF: 600 mL crystalloid  UOP:  350 mL blood tinged urine at the end of the case  Drains: Silva catheter   Specimens: Placenta, cord blood, cord segment  Complications: 5 cm left cervical extension     Indications:   Rona Krishnamurthy is a 37 year old  at 38w4d admitted for IOL for gHTN and gDMA1. Her labor was augmented with AROM and pitocin. She progressed to full dilation and pushed but a category II fetal heart tracing with deep recurrent late decelerations developed. The pitocin was turned off and the fetus was allowed to recovered. She labored down for about 2 hours. She continued to have spontaneous decelerations. A cervical exam was repeated and revealed no fetal descent. At this point arrest of descent with category II fetal heart tracing remote from delivery was diagnosed. Delivery via  section was recommended.   The risks, benefits, and alternatives of  section were discussed with the patient, and she agreed to proceed.  Written consent.     Findings:   Vigorous female infant born on [birth year] at 1800 by  section. APGARs 9&9, weight 3118g. Cephalic presentation, ROP position. Clear amniotic fluid. No nuchal cord. Placenta  delivered intact with 3-vessel cord. Normal appearing uterus, ovaries and fallopian tubes. No abdominal wall or intraabdominal wall adhesions. A 5 cm left cervical extension was noted and repaired.     Procedure Details:   The patient was brought to the OR, where adequate spinal anesthesia was administered.  She was placed in the dorsal supine position with a slight leftward tilt. She was prepped and draped in the usual sterile fashion. A surgical time out was performed. A pfannenstiel skin incision was made with the scalpel, and carried down to the underlying fascia with sharp and blunt dissection. The fascia was incised in the midline, and the incision was extended laterally with the Lomax scissors. The superior aspect of the fascia was grasped with the Kocher clamps and dissected off of the underlying rectus muscles with blunt and sharp dissection. Attention was then turned to the inferior aspect of the fascia, which was similarly dissected off of the underlying rectus muscles. The rectus muscles were  in the midline, and the peritoneum was entered bluntly, and the opening was extended with digital pressure. The bladder blade was placed. A transverse hysterotomy was made with the scalpel in the lower uterine segment, and the incision was extended with digital pressure. The infant was noted to be in the ROP position, and was delivered atraumatically. The shoulders delivered easily.  No nuchal cord was noted. After 60 second delay, the cord was doubly clamped and cut, and the infant was handed off to the awaiting nursery staff. A segment of cord was cut. The placenta was delivered with gentle traction on the umbilical cord and uterine massage. The uterus was exteriorized and cleared of all clots and debris. Uterine tone was noted to be firm with pitocin given through the running IV and uterine massage.  A 5 cm left cervical extension was noted and repaired with 0-Monocryl.  The hysterotomy was closed with a  running locked suture of 0 Monocryl.  The hysterotomy was then imbricated using an 0 Monocryl suture. The hysterotomy was noted to be hemostatic. The posterior cul-de-sac was cleared of all clots and debris. The uterus was returned to the abdomen. The pericolic gutters were cleared of all clots and debris. The hysterotomy was reexamined and noted to be hemostatic. A piece of intercede was placed over the hysterotomy. The fascia and rectus muscles were examined and areas of oozing were controlled with electrocautery. A piece of intercede was placed over the rectus muscles. The fascia was closed with a running 0 PDS suture. The subcutaneous tissue was irrigated and areas of oozing were controlled with electrocautery. The subcutaneous tissue was <2 cm in thickness, and was therefore not closed. The skin was closed with 4-0  monocryl and covered with steristrips, and a sterile dressing.      Patient transferred to PACU in stable condition.    Dr. Evangelista and Dr. Zafar were scrubbed and present for entire procedure.     Mónica Jeffery MD PhD   Ob/Gyn PGY-3  7/29/2018 7:30 PM

## 2018-07-29 NOTE — PROGRESS NOTES
Called to patient bedside by CNM to assess labor progress. Patient cervical exam is unchanged from previous exam. Fetal station is still +1 with caput present. Category II fetal heart tracing with recurrent late decelerations present. Fetal station is too high for a vacuum assisted delivery. Arrest of descent diagnosed. Recommended delivery via  section. Patient in agreement. Risks and benefits discussed. All questions answered and concerns addressed. Written consent obtained.     Mónica Jeffery MD PhD  Ob/Gyn PGY-3  2018 5:08 PM

## 2018-07-29 NOTE — PROGRESS NOTES
Called to attend the delivery due to need for  section due to failure to progress.  Infant delivered with spontaneous cry and respirations.  NICU team not needed and dismissed.     NITHIN Lagunas, [facility] 2018 6:20 PM

## 2018-07-29 NOTE — PROVIDER NOTIFICATION
07/29/18 0256   Provider Notification   Provider Name/Title ISAURA Solorzano   Method of Notification Electronic Page   Request Evaluate - Remote   Notification Reason Decels   Electronic paged GLENNA Solorzano due to late decels with mod variability despite repositioning

## 2018-07-29 NOTE — PROGRESS NOTES
Labor progress note    S:  Pt is comfortable   Dr Evangelista is in surgery with complications  Resident reviewing FHR strip  Will continue to observe     O:  Blood pressure 110/56, temperature 98.5  F (36.9  C), resp. rate 16, last menstrual period 10/24/2017, SpO2 99 %, not currently breastfeeding.  General appearance: comfortable.  Contractions:  Contractions are spacing with pitocin off    Every 4-6 minutes. 60-80  seconds duration.  Palpate: strong.  FHT: Baseline 145 with mod variability. Accelerations are present. no decelerations present.  ROM: clear fluid.   Pelvic exam: deferred   Pitocin- none,  Antibiotics- none      A:  IUP @ 38w4d second stage labor   Fetal Heart rate tracing Category  one  GBS- negative  Patient Active Problem List   Diagnosis     High-risk pregnancy, elderly primigravida - [facility] CNM     Elevated blood pressure reading without diagnosis of hypertension- per OB- treat as CHTN for pregnancy     Research study patient-check to see if patient has occlusive device when admitted for labor     Diet controlled gestational diabetes mellitus (GDM) in third trimester     Anemia during pregnancy in third trimester - Iron studies in process     Gestational Hypertension- Per MD, IOL 37-39     Labor and delivery indication for care or intervention         P:  comfort measures prn   Observation  Anticipate   MD consultant on call in OR currently      Angela Davis CNM APRN

## 2018-07-29 NOTE — PROGRESS NOTES
Called to patient bedside by CNM to assess cervix and possible AROM. Patient is agreeable to a cervical exam with AROM.  SVE 4/70/-2, fetal head is well applied. AROM performed with return of clear fluid.    FHT: baseline 140, moderate variability, no acels, no decels  [place]: contractions are 3 in 10 minutes.    Dr. Evangelista was present.    Mónica Jeffery MD PhD  Ob/Gyn PGY-3  7/29/2018 8:22 AM

## 2018-07-29 NOTE — PROGRESS NOTES
[facility] Labor and Delivery Progress Note    Rona Krishnamurthy MRN# [id number]   Age: 37 year old YOB: 1980           Subjective:   Called to room by RN to assess EFM strip.   Took medications to help with sleep at 0215. Tired, but doing well overall.          Objective:   Patient Vitals for the past 8 hrs:   BP Temp Temp src Resp   18 0240 - 98.1  F (36.7  C) Oral 16   18 0210 117/55 - - -   18 0000 123/60 - - -   18 2305 129/62 99.1  F (37.3  C) Oral -   18 2215 125/63 - - -        Cervical Exam: deferred     Membranes: Intact     Fetal Heart Rate:    Monitor: External US    Baseline Rate: 150 bpm.    Variability: Minimal to Moderate. Late decelerations present. Resolved with pitocin turned off, repositioned  on right side, O2 via mask, and IV fluid bolus started.     [place]: contractions every 2-4 minutes, lasting 60-90 seconds. Mild   Pitocin off         Assessment:   Rona Krishnamurthy is a 37 year old  who is 38w3d here for IOL  Indication: AMA at term, gestational HTN, GDM   Misoprostol x7 doses, now pitocin   Fetal Heart Rate Tracing: now Category 1  GBS negative            Plan:   Discussed EFM strip with patient and . IF continues to have recurrent late decelerations then will recommend . Pitocin off for now, will restart after 20 more minutes/category 1 fetal heart rate tracing.   Comfort measures prn   Continue to monitor   NITHIN Childress CNM

## 2018-07-29 NOTE — PLAN OF CARE
Problem: Patient Care Overview  Goal: Plan of Care/Patient Progress Review  Outcome: No Change  Pt labored down for 3 hours. Denies feeling any urge to push. Pitocin was restarted at 1324 at 1 milliunit and then increased to 2mu. FHTs had become category 1, but shortly after pitocin increased to 2mu, the FHTs showed a prolonged decel x5 minutes. Pitocin infusion was turned off, oxygen therapy restarted, IV fluid bolus given and Dr. Evangelista called to room to assess per Betty Reinisch request. FHTs recovered with interventions. Plan to allow recovery period for fetus. Dr. Evangelista has pt in OR being prepped and requested pitocin remain off and not to begin pushing until she is available. Dr. Costello is available in the interim if pt needs an attending OB MD. Will continue closely monitoring. Planning recheck BG at 1530 and possibly starting insulin drip if indicated.

## 2018-07-29 NOTE — BRIEF OP NOTE
[facility]   Brief Operative Note     Surgery Date: 2018  Surgeon:  Demetra Evangelista MD  Assistants:  Mónica Jeffery MD PhD PGY-3    Kianna Baldwin MD PGY-2    Rhonda Rene MD PGY-1    Pre-op Diagnosis:    -  at 38w4d  - Arrest of descent  - Category II fetal heart tracing remote from delivery  - gestational hypertension  - gDMA1    Post-op Diagnosis:    - Same as above    Procedure:  Primary low-transverse  section with double layer uterine closure via Pfannensteil incision    Anesthesia: Spinal  EBL:  1120 mL  IVF:  600 mL crystalloid  UOP:  350 mL blood tinged urine at the end of the case  Drains: Silva catheter   Specimens:  Placenta, cord blood, cord segment  Complications: None     Findings:   Vigorous female infant born on [birth year] at 1800 by  section. APGARs pending, weight pending. Cephalic presentation. Clear amniotic fluid. No nuchal cord. Placenta delivered intact with 3-vessel cord. Normal appearing uterus, ovaries and fallopian tubes. No abdominal wall or intraabdominal wall adhesions.       Disposition:  Stable to PACU    Kianna Baldwin MD  OB/GYN Resident, PGY-2  2018 5:27 PM

## 2018-07-29 NOTE — PROVIDER NOTIFICATION
07/28/18 2000   Provider Notification   Provider Name/Title ISAURA Solorzano   Method of Notification At Bedside   Notification Reason Status Update;Other (Comment)   Pit restarted, category I tracing

## 2018-07-29 NOTE — PLAN OF CARE
Problem: Patient Care Overview  Goal: Plan of Care/Patient Progress Review  Outcome: Therapy, progress towards functional goals is fair  Patient vital signs stable and afebrile. Denies leaking fluid, bleeding or abdominal pain. No cramping and not feeling contractions. Patient able to rest in between cares and took morphine and vistaril for sleep. Pitocin turned off at approx 0345 due to late and variable decelerations. Repositioned, fluid bolus, oxygen and provider notified and at bedside. Resumed pitocin at 0430. See flowsheet and provider notification notes to interpret toco/EFM strip. Will continue with plan of care and monitor closely.

## 2018-07-29 NOTE — ANESTHESIA PREPROCEDURE EVALUATION
Anesthesia Evaluation       history and physical reviewed .      No history of anesthetic complications          ROS/MED HX    ENT/Pulmonary:      (-) asthma and recent URI   Neurologic:  - neg neurologic ROS     Cardiovascular:     (+) --PIH, --. : . . . :. .       METS/Exercise Tolerance:     Hematologic:         Musculoskeletal:         GI/Hepatic:  - neg GI/hepatic ROS       Renal/Genitourinary:         Endo:     (+) type II DM .   (-) Type I DM   Psychiatric:         Infectious Disease:         Malignancy:         Other:                     Physical Exam  Normal systems: cardiovascular, pulmonary and dental    Airway   Mallampati: II  TM distance: > 3 FB  Neck ROM: full  Mouth opening: > 3 cm    Dental     Cardiovascular       Pulmonary           neg OB ROS                 Anesthesia Plan      History & Physical Review  History and physical reviewed and following examination; no interval change.    ASA Status:  3 .  OB Epidural Asa: 3   NPO Status:  Full stomach    Plan for Epidural and Periph. Nerve Block for postop pain   PONV prophylaxis:  Ondansetron (or other 5HT-3)  IOL on day 3, for gHTN, AMA, diet controlled GDM      Postoperative Care  Postoperative pain management:  Neuraxial analgesia, Peripheral nerve block (Single Shot) and Multi-modal analgesia.      Consents  Anesthetic plan, risks, benefits and alternatives discussed with:  Patient and Patient.  Use of blood products discussed: Yes.   Use of blood products discussed with Patient.  Consented to blood products.  .        Patient to OR for CS due to arrest of descent, cat II FHT.  Plan to use epidural for surgical anesthesia, Duramorph, TAP blocks with Exparel.     Liliam Hooper MD  July 29, 2018

## 2018-07-29 NOTE — PROVIDER NOTIFICATION
07/29/18 0345   Provider Notification   Provider Name/Title C Jeanine   Method of Notification Electronic Page   Notification Reason Decels   CNM electronically paged to evaluate patient due to recurrent late decelerations and variable x2 with lamar 60 bpm

## 2018-07-29 NOTE — PLAN OF CARE
Patient repositioned due to x2 variables with lamar of 60. Repositioned, fluid bolus, oxygen implemented, and pitocin turned off. GLENNA Solorzano paged and at bedside. Will continue with plan of care and monitor closely. GLENNA requests pitocin to be initiated after a recovery period and no decels. GLENNA Solorzano at bedside/in department reviewing strip.

## 2018-07-29 NOTE — PROVIDER NOTIFICATION
07/29/18 1413   Oxygen Therapy   Flow (L/min) 15   O2 Device nonrebreather mask   Labor Care   Labor Interventions Intravenous fluid bolus;Oxygen therapy;Oxytocin infusion discontinued;Provider notified   Provider Notification   Provider Name/Title Dr. Evangelista   Method of Notification Electronic Page   Request Evaluate in Person   Notification Reason Decels

## 2018-07-29 NOTE — PROGRESS NOTES
Labor progress note    S:  Pt is resting comfortably in bed denies discomfort no urge to push  Discussed per protocol will begin pushing now     O:  Blood pressure 110/56, temperature 98.4  F (36.9  C), temperature source Oral, resp. rate 16, last menstrual period 10/24/2017, SpO2 91 %, not currently breastfeeding.  General appearance: comfortable.  Contractions: Every 3-4 minutes. 60-90  seconds duration.  Palpate: strong.   IUPC has been in place   FHT: Baseline 135 with mod  variability. Accelerations are present.   At 1411 when CNM entered room with RN a  deceleration began down to 70-90 x 3.5 min vtx with increased caput at +1 stat.  Unchanged. IV pit turned off pt repositioned Right to left to semi foulers MD called to room IV opened O2 on per mask   2 more decles noted with contractions to 70 bpm x 60 sec.  Resolved with MD entering room  To 150 bpm   MD about to do C/S pt is in OR    Pelvic exam: 10/ 100%/ Anterior/ soft/ +1 with caput lower  Re examined by Dr. Evangelista agrees with exam       Pitocin- none,  Antibiotics- none      A:  IUP @ 38w4d second stage labor   Fetal Heart rate tracing Category  Two   GBS- negative  Patient Active Problem List   Diagnosis     High-risk pregnancy, elderly primigravida - [facility] CNM     Elevated blood pressure reading without diagnosis of hypertension- per OB- treat as CHTN for pregnancy     Research study patient-check to see if patient has occlusive device when admitted for labor     Diet controlled gestational diabetes mellitus (GDM) in third trimester     Anemia during pregnancy in third trimester - Iron studies in process     Gestational Hypertension- Per MD, IOL 37-39     Labor and delivery indication for care or intervention         P:  Observation  MD consultant on call Dr Evangelista heading to OR plan as discussed with couple to defer pushing monitor FHR closely  Leave IV pitocin off at this time.  If urgent need to contact Dr. Trang Evangelista in OR  Possibly attempt  pushing when MD available or reevaluate for VE or consider C/S at anytime.  Couple is agreeable with plan / available prn   Discussed with couple if baby does not descend further  section may be recommended when MD reevaluates   Angela Davis CNM APRN

## 2018-07-30 LAB
GLUCOSE BLDC GLUCOMTR-MCNC: 81 MG/DL (ref 70–99)
HGB BLD-MCNC: 8.6 G/DL (ref 11.7–15.7)
KETONES BLD-SCNC: 0 MMOL/L (ref 0–0.6)

## 2018-07-30 PROCEDURE — 25000132 ZZH RX MED GY IP 250 OP 250 PS 637: Performed by: ADVANCED PRACTICE MIDWIFE

## 2018-07-30 PROCEDURE — 00000146 ZZHCL STATISTIC GLUCOSE BY METER IP

## 2018-07-30 PROCEDURE — 36415 COLL VENOUS BLD VENIPUNCTURE: CPT | Performed by: MIDWIFE

## 2018-07-30 PROCEDURE — 25000132 ZZH RX MED GY IP 250 OP 250 PS 637: Performed by: STUDENT IN AN ORGANIZED HEALTH CARE EDUCATION/TRAINING PROGRAM

## 2018-07-30 PROCEDURE — 85018 HEMOGLOBIN: CPT | Performed by: MIDWIFE

## 2018-07-30 PROCEDURE — 25000128 H RX IP 250 OP 636: Performed by: STUDENT IN AN ORGANIZED HEALTH CARE EDUCATION/TRAINING PROGRAM

## 2018-07-30 PROCEDURE — 82010 KETONE BODYS QUAN: CPT | Performed by: MIDWIFE

## 2018-07-30 PROCEDURE — 25000128 H RX IP 250 OP 636: Performed by: ADVANCED PRACTICE MIDWIFE

## 2018-07-30 PROCEDURE — 12000030 ZZH R&B OB INTERMEDIATE UMMC

## 2018-07-30 RX ADMIN — ACETAMINOPHEN 975 MG: 325 TABLET, FILM COATED ORAL at 14:55

## 2018-07-30 RX ADMIN — SENNOSIDES AND DOCUSATE SODIUM 2 TABLET: 8.6; 5 TABLET ORAL at 19:50

## 2018-07-30 RX ADMIN — RANITIDINE 150 MG: 150 TABLET, FILM COATED ORAL at 19:50

## 2018-07-30 RX ADMIN — SODIUM CHLORIDE, POTASSIUM CHLORIDE, SODIUM LACTATE AND CALCIUM CHLORIDE: 600; 310; 30; 20 INJECTION, SOLUTION INTRAVENOUS at 04:21

## 2018-07-30 RX ADMIN — KETOROLAC TROMETHAMINE 30 MG: 30 INJECTION, SOLUTION INTRAMUSCULAR at 19:50

## 2018-07-30 RX ADMIN — KETOROLAC TROMETHAMINE 30 MG: 30 INJECTION, SOLUTION INTRAMUSCULAR at 02:00

## 2018-07-30 RX ADMIN — KETOROLAC TROMETHAMINE 30 MG: 30 INJECTION, SOLUTION INTRAMUSCULAR at 14:56

## 2018-07-30 RX ADMIN — ACETAMINOPHEN 975 MG: 325 TABLET, FILM COATED ORAL at 22:47

## 2018-07-30 RX ADMIN — ACETAMINOPHEN 975 MG: 325 TABLET, FILM COATED ORAL at 06:34

## 2018-07-30 RX ADMIN — RANITIDINE 150 MG: 150 TABLET, FILM COATED ORAL at 08:06

## 2018-07-30 RX ADMIN — KETOROLAC TROMETHAMINE 30 MG: 30 INJECTION, SOLUTION INTRAMUSCULAR at 08:05

## 2018-07-30 RX ADMIN — SENNOSIDES AND DOCUSATE SODIUM 1 TABLET: 8.6; 5 TABLET ORAL at 08:06

## 2018-07-30 NOTE — PLAN OF CARE
Problem: Patient Care Overview  Goal: Plan of Care/Patient Progress Review  Outcome: Improving  Data: Rona Krishnamurthy transferred to [id number] via chart at 2230. Baby transferred via parent's arms.  Action: Receiving unit notified of transfer: Yes. Patient and family notified of room change. Report given to CALLIE Ness at 2230. Belongings sent to receiving unit. Accompanied by Registered Nurse. Oriented patient to surroundings. Call light within reach. ID bands double-checked with receiving RN.  Response: Patient tolerated transfer and is stable.

## 2018-07-30 NOTE — PROGRESS NOTES
SW attempted to meet with Mom for psychosocial assessment, but she is with another provider.  Will attempt at a later time.     ARVIN Landin, LGSW    [facility]  Phone: [phone]  Pager: [phone]    ADD 2196: SW attempted again to meet with Mom.  She was not in her room or at baby's bedside. Will attempt to see tomorrow.

## 2018-07-30 NOTE — ANESTHESIA CARE TRANSFER NOTE
Patient: Rona Krishnamurthy    Procedure(s):   - Wound Class: II-Clean Contaminated    Diagnosis: Pregnancy  Diagnosis Additional Information: No value filed.    Anesthesia Type:   Epidural, Peripheral Nerve Block for post-op pain at surgeon's request     Note:  Airway :Room Air  Patient transferred to:PACU  Handoff Report: Identifed the Patient, Identified the Reponsible Provider, Reviewed the pertinent medical history, Discussed the surgical course, Reviewed Intra-OP anesthesia mangement and issues during anesthesia, Set expectations for post-procedure period and Allowed opportunity for questions and acknowledgement of understanding      Vitals: (Last set prior to Anesthesia Care Transfer)    CRNA VITALS  7/29/2018 1713 - 7/29/2018 1813      7/29/2018             Pulse: 97    Ht Rate: 97    SpO2: 96 %      CRNA VITALS  7/29/2018 1833 - 7/29/2018 1912      7/29/2018             Pulse: 84    SpO2: 97 %                Electronically Signed By: NITHIN Hernandez CRNA  July 29, 2018  7:12 PM

## 2018-07-30 NOTE — PLAN OF CARE
Problem: Patient Care Overview  Goal: Plan of Care/Patient Progress Review  Outcome: Therapy, progress toward functional goals as expected  VSS. Taking oral fluids well. Silva draining dark [place]orgie urine .  Dressing dry over incision.  IV infusing well in RT hand. Attempted to breast feed baby.Some hand expression with a glistening on nipple. No latch. Baby to NICU for low BG's at 0200. Asking appropriate question regarding baby's condition. NNP here to address questions. Patient exhausted. They will try to sleep for a couple hours , then go to NICU to see baby.Stable post .

## 2018-07-30 NOTE — PLAN OF CARE
Problem: Patient Care Overview  Goal: Plan of Care/Patient Progress Review  Outcome: No Change  Pt admitted to postpartum floor at 2230. VSS. Postpartum assessment WDL. Silva draining red tinged urine. Given tylenol for pain. Bonding well with . Tolerating PO intake

## 2018-07-30 NOTE — PLAN OF CARE
Problem: Patient Care Overview  Goal: Plan of Care/Patient Progress Review  Outcome: Improving  Pt vitals & assessments are stable. Pain controled with Toradol & Tylenol. Silva removed & due to void. Pumping & visited her infant in NICU. Will continue on supportive cares.

## 2018-07-30 NOTE — PLAN OF CARE
Problem: Patient Care Overview  Goal: Plan of Care/Patient Progress Review  Outcome: Therapy, progress toward functional goals as expected  VSS. Slept at brief intervals. Silva draining lite georgie urine. No blood noted in urine. Taking oral fluids well. Plan to ambulate this am. If tolerating activity, will go to NICU to visit baby.

## 2018-07-30 NOTE — ANESTHESIA PROCEDURE NOTES
Peripheral Nerve Block Procedure Note    Staff:     Anesthesiologist:  HARLEY ALBERTO    Referred By:  IGNACIA HERNADEZ  Location: OB  Procedure Start/Stop TImes:      2018 7:15 PM     2018 7:23 PM    patient identified, IV checked, site marked, risks and benefits discussed, informed consent, monitors and equipment checked, pre-op evaluation, at physician/surgeon's request and post-op pain management      Correct Patient: Yes      Correct Position: Yes      Correct Site: Yes      Correct Procedure: Yes      Correct Laterality:  N/A    Site Marked:  N/A  Procedure details:     Procedure:  Paravertebral    ASA:  2    Diagnosis:  Postoperative  pain    Laterality:  Bilateral    Position:  Supine    Sterile Prep: chloraprep      Local skin infiltration:  None    Needle:  Insulated    Needle gauge:  21    Needle length (inches):  3.1    Ultrasound: Yes      Ultrasound used to identify targeted nerve, plexus, or vascular structure and placed a needle adjacent to it      Permanent Image entered into patiient's record      Abnormal pain on injection: No      Blood Aspirated: No      Paresthesias:  No    Bleeding at site: No      Bolus via:  Needle    Infusion Method:  Single Shot  Assessment/Narrative:     Volume per aspiration (mL): 3-5.

## 2018-07-30 NOTE — PLAN OF CARE
Problem: Patient Care Overview  Goal: Plan of Care/Patient Progress Review  Assisted up to bathroom at 0600. Partial bath done. Tolerated activity well! Initiated breast pump. Obtained a few drops colostrum.Saved for baby in NICU.

## 2018-07-30 NOTE — PROGRESS NOTES
Post Partum Progress Note    Subjective:  Patient is doing well.  She is exhausted but otherwise denies concerns. Minimal lochia  Pain well controlled on pain meds.  Tolerating small amounts of PO intake and fluids. Sat on the side of the bed and walked to the bathroom without dizziness. No flatus or bowel movement.  Denies any fever, chills, SOB, chest pain, N/V,  headache, dizziness.  Planning on breast feeding and is pumping for baby in the NICU.  Undecided on birth control.     Objective:  Patient Vitals for the past 24 hrs:   BP Temp Temp src Pulse Heart Rate Resp SpO2   07/30/18 0559 113/66 - - 90 90 16 96 %   07/30/18 0400 120/64 - - - 68 16 93 %   07/30/18 0200 - - - - 76 16 92 %   07/30/18 0047 116/67 98.2  F (36.8  C) Oral - 70 16 94 %   07/29/18 2350 119/64 - - - 78 16 96 %   07/29/18 2236 130/58 97.9  F (36.6  C) Oral - 80 16 95 %   07/29/18 2200 - 98.5  F (36.9  C) Oral - - - -   07/29/18 2125 113/70 - - - 76 14 91 %   07/29/18 2105 119/58 - - - 82 (!) 0 -   07/29/18 2040 122/55 - - - 91 10 97 %   07/29/18 2025 101/61 - - - 67 23 -   07/29/18 2010 - - - - - - 92 %   07/29/18 2005 114/70 - - - - - 94 %   07/29/18 1955 112/63 - - - - - -   07/29/18 1940 113/72 - - - 84 16 96 %   07/29/18 1925 (!) 115/98 - - - 87 13 -   07/29/18 1910 127/66 98.9  F (37.2  C) Oral - 83 16 -   07/29/18 1527 142/83 98.5  F (36.9  C) Oral - - - -   07/29/18 1440 - - - - - - 99 %   07/29/18 1413 - - - - - - 91 %   07/29/18 1350 110/56 - - - - - 98 %   07/29/18 1330 - - - - - - 100 %   07/29/18 1324 123/60 - - - - - 100 %   07/29/18 1300 - 98.4  F (36.9  C) Oral - - - 100 %   07/29/18 1230 - - - - - - 100 %   07/29/18 1200 - - - - - - 100 %   07/29/18 1130 - - - - - - 99 %   07/29/18 1107 - - - - - - 97 %   07/29/18 1100 - - - - - - 98 %   07/29/18 1035 132/64 - - - - - 99 %   07/29/18 1030 - - - - - - 97 %   07/29/18 1005 122/57 - - - - - 98 %   07/29/18 1000 136/62 - - - - - 98 %   07/29/18 0945 135/65 - - - - - 97 %    18 0941 111/57 - - - - - -   18 0935 123/71 - - - - - 97 %   18 0930 115/68 - - - - - 97 %   18 0925 119/77 - - - - 16 97 %   18 0920 132/74 - - - - - 97 %   18 0915 128/71 - - - - - 97 %   18 0910 128/72 - - - - - 98 %   18 0905 138/77 - - - - - 97 %   18 0903 130/72 - - - - - -   18 0901 132/70 - - - - - -   18 0859 141/74 99.3  F (37.4  C) Oral - - - 100 %   18 0857 148/75 - - - - - -   18 0700 105/55 - - - - - -       General: AAOx3, NAD, appears generally well  Resp:  Breathing comfortably on room air  Abd:  Mildly distended, appropriately tender. Fundus firm at the umbilicus.  Incision: Clean dressing in place  Ext:  2+ edema in bilateral LE      Assessment and Plan:  37 year old old  POD#1 s/p PLTCS for category II FHT remote from delivery and arrest of descent. She was a transfer of care from the midwife service. She is doing well in the postoperative period.     - Gestational hypertension: HELLP wnl. BPs currently normotensive, 1 mild range in postop period  - Pain: tylenol, ibuprofen. Oxycodone (has not taken yet). S/p TAP block.  - Heme: Hgb 11.2 > QBL 1120 > AM pending   - GDMA1: Fasting BG 81  - THC use in pregnancy, SW consulted  - Rh positive, rubella immune  - Feeding: breast, currently pumping for baby in NICU  - Birth control: undecided, prefers to discuss at 6 week postpartum visit    Anticipate discharge to home POD#3.     # Pain Assessment:  Current Pain Score 2018   Patient currently in pain? denies   Pain location -   Pain descriptors -   - Rona is experiencing pain due to  delivery. Pain management was discussed and the plan was created in a collaborative fashion.  Rona's response to the current recommendations: engaged  - Opioid regimen: oxycodone, 5-10 mg q3h PRN  - Response to opioid medications: has not taken yet   - Bowel regimen: senna  - Pharmacologic adjuvants: NSAIDs and  Acetaminophen  - Non-pharmacologic adjuvants: Heat and Ice    Rona Schmitz, PGY3  OB/GYN Resident  7/30/18    [facility] staff:  Appreciate note by Dr. Schmitz.  I have seen and examined the patient without the resident. I have reviewed, edited, and agree with the note.      Hgb this AM 8.6 from 11.2. However, patient asymptomatic, reports vaginal bleeding normal, and vitals stable. Urine is now clear. Okay to discontinue Silva and will continue to monitor symptoms/vitals but overall acute blood loss anemia appears stable.     Demetra Evangelista MD  7/30/2018  8:57 AM

## 2018-07-30 NOTE — ANESTHESIA POSTPROCEDURE EVALUATION
Patient: Rona Krishnamurthy    Procedure(s):   - Wound Class: II-Clean Contaminated    Diagnosis:Pregnancy  Diagnosis Additional Information: No value filed.    Anesthesia Type:  Epidural, Peripheral Nerve Block for post-op pain at surgeon's request    Note:  Anesthesia Post Evaluation    Patient location during evaluation: OB PACU  Patient participation: Able to participate in evaluation but full recovery from regional anesthesia has not yet ocurrred but is anticipated to occur within 48 hours  Level of consciousness: awake and alert  Pain management: adequate  Airway patency: patent  Cardiovascular status: hemodynamically stable  Respiratory status: room air  Hydration status: acceptable  PONV: none     Anesthetic complications: None    Comments: Patient holding baby, Mariel. Feels well.        Last vitals:  Vitals:    07/30/18 0200 07/30/18 0400 07/30/18 0559   BP:  120/64 113/66   Pulse:   90   Resp: 16 16 16   Temp:      SpO2: 92% 93% 96%         Electronically Signed By: Yareli Garcia MD  July 30, 2018  7:25 AM

## 2018-07-30 NOTE — LACTATION NOTE
D:  I met with Rona at bedside today.  Mariel is her first baby.  Rona is normally in good health, takes no medications.  She did have a fibroadenoma removed from one breast.  She has already started to pump.  She has already received a Spectra pump for home use.  I:  I gave her a folder of introductory materials and went over pumping guidelines.    We talked about hands on pumping techniques, hand expression and how to access the [place] websites. I had already worked on a finger feeding with the baby earlier in the AM.  Her nurse was unable to get her to take anything orally, and somewhat later, I could only get her to take 5 ml.  She had a big stool and after being cleaned up, did show some feeding cues.  I helped Rona work on positioning her.  The cross cradle was not comfortable, the underarm hold was.  Mariel fell asleep nearly immediately without latching.  We talked with the resident about starting gavage feedings and continuing to pump.  A:  Baby has never latched yet, has poor oral intake.  P:  Will continue to provide lactation support.       Theresa Lemus, RNC, IBCLC

## 2018-07-30 NOTE — PROVIDER NOTIFICATION
18   Provider Notification   Provider Name/Title Dr. Baldwin-Resident MD   Method of Notification Electronic Page   Request Evaluate-Remote   Notification Reason Other  (urine output is bloody red )   Notified provider that urine output is bloody va stewart catheter. Since there was blood in urine prior to the , plan per provider is to keep an eye on the color of the urine output. Continue with plan of care.

## 2018-07-30 NOTE — PLAN OF CARE
Problem: Patient Care Overview  Goal: Plan of Care/Patient Progress Review  Outcome: Improving  Pt delivered a viable Female via  at 1800 with Dr. Evangelista and Dr. Jeffery in attendance.  NICU present at delivery. Nursery RN Camila AMAYA, assuming care of infant after 1 minute apgar. Infant with spontaneous cry, to baby warmer, dried and stimulated.  APGAR at 1 minute: 9 and APGAR at 5 minutes:  9. Placenta delivered without complication. Fundus firm U/1 with scant bleeding. Surgical QBL is 1120ml. Pt transferred to PACU at 1906. VSS, afebrile. Pt denies pain. Mother and baby in stable condition.

## 2018-07-31 PROCEDURE — 25000132 ZZH RX MED GY IP 250 OP 250 PS 637: Performed by: ADVANCED PRACTICE MIDWIFE

## 2018-07-31 PROCEDURE — 25000132 ZZH RX MED GY IP 250 OP 250 PS 637: Performed by: STUDENT IN AN ORGANIZED HEALTH CARE EDUCATION/TRAINING PROGRAM

## 2018-07-31 PROCEDURE — 12000028 ZZH R&B OB UMMC

## 2018-07-31 RX ORDER — FERROUS SULFATE 325(65) MG
325 TABLET ORAL
Qty: 45 TABLET | Refills: 2 | Status: SHIPPED | OUTPATIENT
Start: 2018-07-31 | End: 2018-09-18

## 2018-07-31 RX ORDER — OXYCODONE HYDROCHLORIDE 5 MG/1
5-10 TABLET ORAL
Qty: 10 TABLET | Refills: 0 | Status: SHIPPED | OUTPATIENT
Start: 2018-07-31 | End: 2018-08-13

## 2018-07-31 RX ORDER — IBUPROFEN 600 MG/1
600 TABLET, FILM COATED ORAL EVERY 6 HOURS PRN
Qty: 30 TABLET | Refills: 0 | Status: SHIPPED | OUTPATIENT
Start: 2018-07-31 | End: 2018-09-13

## 2018-07-31 RX ORDER — ACETAMINOPHEN 325 MG/1
650 TABLET ORAL EVERY 4 HOURS PRN
Qty: 40 TABLET | Refills: 0 | Status: SHIPPED | OUTPATIENT
Start: 2018-08-01 | End: 2018-09-13

## 2018-07-31 RX ORDER — AMOXICILLIN 250 MG
1-2 CAPSULE ORAL 2 TIMES DAILY PRN
Qty: 40 TABLET | Refills: 1 | Status: SHIPPED | OUTPATIENT
Start: 2018-07-31 | End: 2021-07-30

## 2018-07-31 RX ADMIN — SENNOSIDES AND DOCUSATE SODIUM 2 TABLET: 8.6; 5 TABLET ORAL at 20:11

## 2018-07-31 RX ADMIN — SENNOSIDES AND DOCUSATE SODIUM 2 TABLET: 8.6; 5 TABLET ORAL at 08:17

## 2018-07-31 RX ADMIN — IBUPROFEN 800 MG: 400 TABLET ORAL at 08:17

## 2018-07-31 RX ADMIN — OXYCODONE HYDROCHLORIDE 5 MG: 5 TABLET ORAL at 16:16

## 2018-07-31 RX ADMIN — IBUPROFEN 800 MG: 400 TABLET ORAL at 20:06

## 2018-07-31 RX ADMIN — OXYCODONE HYDROCHLORIDE 5 MG: 5 TABLET ORAL at 08:17

## 2018-07-31 RX ADMIN — SIMETHICONE CHEW TAB 80 MG 80 MG: 80 TABLET ORAL at 16:15

## 2018-07-31 RX ADMIN — RANITIDINE 150 MG: 150 TABLET, FILM COATED ORAL at 20:11

## 2018-07-31 RX ADMIN — ACETAMINOPHEN 975 MG: 325 TABLET, FILM COATED ORAL at 06:47

## 2018-07-31 RX ADMIN — SIMETHICONE CHEW TAB 80 MG 80 MG: 80 TABLET ORAL at 22:28

## 2018-07-31 RX ADMIN — OXYCODONE HYDROCHLORIDE 5 MG: 5 TABLET ORAL at 22:28

## 2018-07-31 RX ADMIN — ACETAMINOPHEN 975 MG: 325 TABLET, FILM COATED ORAL at 16:15

## 2018-07-31 RX ADMIN — IBUPROFEN 800 MG: 400 TABLET ORAL at 13:47

## 2018-07-31 NOTE — PLAN OF CARE
Problem: Patient Care Overview  Goal: Plan of Care/Patient Progress Review  Outcome: Therapy, progress toward functional goals as expected  VSS. Fundus firm and midline; bleeding WDL. Incision RENU with steri-strips intact; no s/sx of infection at incision site. Patient is pumping for baby in NICU. Discussed importance of pumping 8-12x every 24 hours. Clustered care to promote rest. Pain well controlled with ibuprofen and tylenol overnight. She has not had a BM since before her delivery. She may consider a suppository in the AM. However, her bowel sounds are active tonight. NICU nurse called to inform patient that baby had to receive IV glucose to maintain blood sugar levels. Patient appeared calm when receiving this information. Explained in more detail to patient why baby needed to receive IV glucose. She was emotional and cryign this AM regarding her baby's condition. This RN offered emotional support and gave her ideas for ways she could continue to be there for her baby while baby is in NICU. Did side by side hand expression with patient to increase her confidence with her technique. Encouraged patient to spend as much time as she wants in the NICU today and encouraged her to try skin to skin as often as she can with her baby's condition. Continue POC and providing emotional support as needed.  at bedside and supportive.

## 2018-07-31 NOTE — PROGRESS NOTES
Post Partum Progress Note    Subjective:  Patient is doing well.  No complaints. Minimal lochia.  Pain well controlled on pain meds.  Tolerating PO and ambulating without any issues.  Voiding spontaneously. Passing flatus. Denies any fever, chills, SOB, chest pain, N/V,  headache, dizziness.  Planning on breast feeding.  Undecided on birth control.     Objective:  Patient Vitals for the past 24 hrs:   BP Temp Temp src Pulse Heart Rate Resp SpO2   18 0057 99/53 98  F (36.7  C) Oral - 77 16 -   18 1800 127/81 98.1  F (36.7  C) Oral - 94 16 -   18 1456 108/60 98.2  F (36.8  C) Tympanic 98 - 18 -   18 0810 114/64 98.3  F (36.8  C) Oral 86 - 18 98 %       General: AAOx3, NAD, appears generally well  Resp:  Breathing comfortably on room air  Abd:  Soft, nontender, nondistended, fundus firm below the umbilicus  Incision: clean/dry/intact, steri strips in place  Ext:  2+ edema in bilateral LE      Assessment and Plan:  37 year old old  POD#2 s/p PLTCS for category II FHT remote from delivery and arrest of descent. She was a transfer of care from the midwife service. She is doing well in the postoperative period.      - Gestational hypertension: HELLP wnl. BPs currently normotensive, 1 mild range in postop period  - Pain: tylenol, ibuprofen. Oxycodone (has not taken yet). S/p TAP block.  - Heme: Acute blood loss anemia secondary to surgical blood loss. Hgb 11.2 > QBL 1120 > 8.6. Asymptomatic.  - GDMA1: Fasting BG 81  - THC use in pregnancy, SW consulted  - Rh positive, rubella immune  - Feeding: breast, currently pumping for baby in NICU  - Birth control: undecided, prefers to discuss at 6 week postpartum visit     Anticipate discharge to home POD#3.     # Pain Assessment:  Current Pain Score 2018   Patient currently in pain? denies   Pain location -   Pain descriptors -   - Rona is experiencing pain due to recent  delivery. Pain management was discussed and the plan was  created in a collaborative fashion.  Rona's response to the current recommendations: engaged  - Opioid regimen: oxycodone, 5-10 mg q3h PRN  - Response to opioid medications: has not taken yet  - Bowel regimen: senna  - Pharmacologic adjuvants: NSAIDs and Acetaminophen    Rona Schmitz, PGY3  OB/GYN Resident  7/31/2018    OB/GYN Staff -- Pt seen and examined by me. Agree with note as above.  MD Carroll

## 2018-07-31 NOTE — PLAN OF CARE
Problem: Patient Care Overview  Goal: Plan of Care/Patient Progress Review  Outcome: Improving  Data: Vital signs within normal limits. Postpartum checks within normal limits - see flow record. Patient eating and drinking normally. Patient able to empty bladder independently and is up ambulating. No apparent signs of infection. Incision healing well. Patient performing self cares and is able to go to NICU to visit infant daughter.  Action: Patient medicated during the shift for discomfort with scheduled medications. See MAR.  She is aware that she has received the last scheduled dose of Ketorlac, and will now have Ibuprofen available to her. Patient reassessed within 1 hour after each medication and pain was improved - patient stated she was comfortable.  Requested and given an abdominal binder. Patient education done about contents of [facility] programs. Encouraged to complete.  See flow record.  Response: Positive attachment behaviors observed with infant. Support person, spouse Lew, present. They have had guests tonight.   Plan: Anticipate discharge when deemed medically ready.  She voices hope that infant will be discharged from NICU and ready to go home with her and spouse.

## 2018-07-31 NOTE — PROGRESS NOTES
CNM courtesy visit  Baby in NICU due to blood sugar issues  Has attempted latch.  Mom is teary this am but coping verbally well.  Spouse very supportive  Is pumping    Betty Davis CNM APRN

## 2018-07-31 NOTE — CONSULTS
"[facility]  MATERNAL CHILD HEALTH   INITIAL NICU PSYCHOSOCIAL ASSESSMENT      DATA:      Reason for Social Work Consult: NICU admission of infant, maternal marijuana use early in pregnancy.     Presenting Information: Pt is a 38.4 gestation baby admitted to the NICU on 2018 for hypoglycemia. Parents are Tiffanie and Lew. Mom is a .       Living Situation: Mom and Dad live together in a house in [place]. This is their first child.     Family Constellation: Mom reports that she and her  have siblings and parents that live nearby and are supportive.     Social Support: Mom reports that she and Dad have an excellent support network of friends and family who are all eager to help.     Education: Not assessed.     Employment: Mom is employed full-time as a  at [employer]. During this hospitalization she reports that she has four months off. She reports having a supportive and flexible workplace and denied stressors related to coordinating maternity leave.  Dad also has a few weeks off from his work in construction.     Insurance: [employer]     Source of Financial Support: Parents' employment      Mental Health History: Mom reports no concerns.     History of Postpartum Mood Disorders: NA - this is Mom's first child.     Chemical Health History: Mom does admit to this writer that she used marijuana early in her pregnancy, but has not again since finding out she was pregnant. She clearly states that she will not use while breastfeeding and does not want to do anything that would harm her baby. She has no concerns about her use.     Current Coping: Mom is tearful, but is coping well. Baby's NICU stay is unexpected, but she feels confident that the blood sugar issue will resolve and they will be able to go home relatively soon. She says she is self-conscious about crying so much, and that the \"rational side\" of her is " "confident in her ability to get through this challenge.   Mom also reports that she has received acupuncture throughout her pregnancy and has felt this has been helpful in keeping her balanced and well. She plans to continue receiving treatments during her postpartum period.  SW let Mom know about the parent wellness center and left a flyer about services at baby's NICU bedside for her.     Community Resources//Baby Supplies: Mom reports that she has all supplies necessary to care for an infant.     INTERVENTION:        SW completed chart review and collaborated with the multidisciplinary team.     Psychosocial Assessment     Introduction to Maternal Child Health  role and scope of practice     Provided \"Meeting Your Basic Needs While Your Child is Hospitalized\" hand out and SW business card     Reviewed Hospital and Community Resources     Assessed Chemical Health History and Current Symptoms     Assessed Mental Health History and Current Symptoms     Identified stressors, barriers and family concerns     Provided supportive counseling. Active empathetic listening and validation.     Provided psychoeducation on  mood and anxiety disorders, assessed for any current symptoms or history    Provided community resource postcard for [facility] [place] ([facility])      ASSESSMENT:      Coping: adequate, functional     Affect: appropriate, calm     Mood:  Tearful, appropriate     Motivation/Ability to Access Services: independent in accessing services     Assessment of Support System: stable, involved, adequate     Level of engagement with SW: Engaged and appropriate. Able to seek out SW when needs arise.      Family s understanding of baby s medical situation: good grasp of the medical situation     Family and parent/infant interactions: Mom reports that her  is very supportive. Chart review indicates that parents have been at baby's bedside frequently and are bonding as " able.     Assessment of parental risk for PMAD: Higher than average given baby's unexpected NICU admission.     Strengths: caring family, willingness to accept help, self-reflective     Vulnerabilities: None noted     Identified Barriers: None at this time.     PLAN:      SW will continue to follow throughout pt's Maternal-Child Health Journey as needs arise. SW will continue to collaborate with the multidisciplinary team.     ARVIN Landin, LGBONNIE    [facility]  Phone: [phone]  Pager: [phone]

## 2018-08-01 VITALS
DIASTOLIC BLOOD PRESSURE: 74 MMHG | HEART RATE: 86 BPM | RESPIRATION RATE: 16 BRPM | TEMPERATURE: 97.8 F | SYSTOLIC BLOOD PRESSURE: 115 MMHG | OXYGEN SATURATION: 99 %

## 2018-08-01 PROCEDURE — 25000132 ZZH RX MED GY IP 250 OP 250 PS 637: Performed by: STUDENT IN AN ORGANIZED HEALTH CARE EDUCATION/TRAINING PROGRAM

## 2018-08-01 RX ORDER — DEXTROSE MONOHYDRATE 25 G/50ML
25-50 INJECTION, SOLUTION INTRAVENOUS
Status: DISCONTINUED | OUTPATIENT
Start: 2018-08-01 | End: 2018-08-01 | Stop reason: HOSPADM

## 2018-08-01 RX ORDER — NICOTINE POLACRILEX 4 MG
15-30 LOZENGE BUCCAL
Status: DISCONTINUED | OUTPATIENT
Start: 2018-08-01 | End: 2018-08-01 | Stop reason: HOSPADM

## 2018-08-01 RX ADMIN — SENNOSIDES AND DOCUSATE SODIUM 2 TABLET: 8.6; 5 TABLET ORAL at 08:24

## 2018-08-01 RX ADMIN — OXYCODONE HYDROCHLORIDE 5 MG: 5 TABLET ORAL at 01:58

## 2018-08-01 RX ADMIN — SIMETHICONE CHEW TAB 80 MG 80 MG: 80 TABLET ORAL at 06:03

## 2018-08-01 RX ADMIN — IBUPROFEN 800 MG: 400 TABLET ORAL at 08:24

## 2018-08-01 RX ADMIN — IBUPROFEN 800 MG: 400 TABLET ORAL at 01:58

## 2018-08-01 RX ADMIN — ACETAMINOPHEN 975 MG: 325 TABLET, FILM COATED ORAL at 00:11

## 2018-08-01 RX ADMIN — ACETAMINOPHEN 975 MG: 325 TABLET, FILM COATED ORAL at 08:24

## 2018-08-01 RX ADMIN — OXYCODONE HYDROCHLORIDE 5 MG: 5 TABLET ORAL at 08:28

## 2018-08-01 NOTE — PROGRESS NOTES
Post Partum Progress Note    Subjective:  Patient is doing well.  No complaints. Minmal lochia.  Pain well controlled on pain meds.  Tolerating PO and ambulating without any issues. Voiding spontaneously. Passing flatus. Denies any fever, chills, SOB, chest pain, N/V,  headache, dizziness.  Planning on breast feeding and is pumping for baby in NICU. Baby will likely be discharged in the next day or two, as she has started to have better blood glucose control. Undecided on birth control but prefers to discuss at 6 week postpartum visit.     Objective:  Patient Vitals for the past 24 hrs:   BP Temp Temp src Pulse Heart Rate Resp SpO2   08/01/18 0017 118/71 97.8  F (36.6  C) Oral - 72 16 -   07/31/18 1916 120/75 98.6  F (37  C) Oral 86 - - 99 %   07/31/18 1500 120/60 97.8  F (36.6  C) Oral - 74 16 -   07/31/18 0800 123/64 97.9  F (36.6  C) Oral - 75 16 -       General: AAOx3, NAD, appears generally well  Resp:  Breathing comfortably on room air  Abd:  Soft, nontender, nondistended, fundus firm below the umbilicus   Incision: clean/dry/intact, steri strips in place  Ext:  2+ edema in bilateral LE      Assessment and Plan:  37 year old old  POD#3 s/p PLTCS for category II FHT remote from delivery and arrest of descent. She was a transfer of care from the midwife service. She is doing well in the postoperative period.       - Gestational hypertension: HELLP wnl. BPs currently normotensive, 1 mild range in postop period  - Pain: tylenol, ibuprofen. Oxycodone PRN. S/p TAP block.  - Heme: Acute blood loss anemia secondary to surgical blood loss. Hgb 11.2 > QBL 1120 > 8.6. Asymptomatic. Will discharge to home with PO iron.  - GDMA1: Fasting BG 81. Plan for 2h GTT at 6 week postpartum visit.  - THC use in pregnancy, S/p social work consult  - Rh positive, rubella immune  - Feeding: breast, currently pumping for baby in NICU  - Birth control: undecided, prefers to discuss at 6 week postpartum visit      Anticipate  discharge to home today.    # Pain Assessment:  Current Pain Score 2018   Patient currently in pain? yes   Pain location Incision   Pain descriptors Sore   - Rona is experiencing pain due to  delivery. Pain management was discussed and the plan was created in a collaborative fashion.  Rona's response to the current recommendations: engaged  - Please see the plan for pain management as documented above      Rona Schmitz, PGY3  OB/GYN Resident  18    Staff MD Note    I appreciate the note by Dr. Schmitz.  Any necessary changes have been made by me.  I evaluated the patient with the resident and agree with the assessment and plan.    Shelby Pal MD

## 2018-08-01 NOTE — PLAN OF CARE
Referral made to [facility] for first time breastfeeding, note made infant in NICU

## 2018-08-01 NOTE — PLAN OF CARE
Problem: Patient Care Overview  Goal: Plan of Care/Patient Progress Review  Outcome: Improving  VSS. Postpartum checks WDL. Up independently, voiding without difficulty. Incision RENU with steri-strips- no s/s of infection noted. Pain managed with tylenol, ibuprofen, oxycodone, and simethicone. Pumping for infant in NICU.  supportive at the bedside.

## 2018-08-01 NOTE — PLAN OF CARE
Problem: Patient Care Overview  Goal: Plan of Care/Patient Progress Review  Outcome: Adequate for Discharge Date Met: 08/01/18  Patient discharged to home. Full assessment and VS WDL. All discharge instructions reviewed and copy given to patient. Discharge meds given prior to leaving.

## 2018-08-01 NOTE — PLAN OF CARE
Problem: Patient Care Overview  Goal: Plan of Care/Patient Progress Review  Outcome: No Change  Pt. Doing very well. Up ad ember and encouraged to walk more due to complaints of gas pain. Did spend quite a bit of time in the NICU with her baby. Pumping every 3 hours independently. Pain well controlled with prn pain meds. Was weepy this morning when talking about her baby, but better this afternoon.  here and supportive.

## 2018-08-01 NOTE — CONSULTS
Diabetes Education  Received consult request to see this 37 year old female.  Patient diagnosed with gestational diabetes.  She received diabetes education on blood glucose monitoring and nutrition.  She was not started on insulin or oral agents.  Patient admitted for induction of labor, ultimately underwent .  Since delivery, her fasting glucose was 81 mg/dL, within normal limits.  Patient should undergo a 2 hour glucose tolerance test at her 6 week postpartum visit.  No educational needs at this time.  Will sign off.  Humera De León MS RN CDE CDTC  [phone]

## 2018-08-03 ENCOUNTER — HOSPITAL ENCOUNTER (INPATIENT)
Facility: CLINIC | Age: 38
LOS: 2 days | Discharge: HOME | DRG: 776 | End: 2018-08-05
Attending: EMERGENCY MEDICINE | Admitting: OBSTETRICS & GYNECOLOGY
Payer: COMMERCIAL

## 2018-08-03 ENCOUNTER — TELEPHONE (OUTPATIENT)
Dept: OBGYN | Facility: CLINIC | Age: 38
End: 2018-08-03

## 2018-08-03 LAB
ALBUMIN SERPL-MCNC: 2 G/DL (ref 3.4–5)
ALBUMIN UR-MCNC: NEGATIVE MG/DL
ALP SERPL-CCNC: 138 U/L (ref 40–150)
ALT SERPL W P-5'-P-CCNC: 49 U/L (ref 0–50)
ALT SERPL W P-5'-P-CCNC: 52 U/L (ref 0–50)
ANION GAP SERPL CALCULATED.3IONS-SCNC: 7 MMOL/L (ref 3–14)
APPEARANCE UR: CLEAR
AST SERPL W P-5'-P-CCNC: 33 U/L (ref 0–45)
AST SERPL W P-5'-P-CCNC: 34 U/L (ref 0–45)
BASOPHILS # BLD AUTO: 0 10E9/L (ref 0–0.2)
BASOPHILS NFR BLD AUTO: 0.1 %
BILIRUB SERPL-MCNC: 0.2 MG/DL (ref 0.2–1.3)
BILIRUB UR QL STRIP: NEGATIVE
BUN SERPL-MCNC: 5 MG/DL (ref 7–30)
CALCIUM SERPL-MCNC: 8.4 MG/DL (ref 8.5–10.1)
CHLORIDE SERPL-SCNC: 111 MMOL/L (ref 94–109)
CO2 SERPL-SCNC: 27 MMOL/L (ref 20–32)
COLOR UR AUTO: ABNORMAL
CREAT SERPL-MCNC: 0.62 MG/DL (ref 0.52–1.04)
CREAT SERPL-MCNC: 0.78 MG/DL (ref 0.52–1.04)
DIFFERENTIAL METHOD BLD: ABNORMAL
EOSINOPHIL # BLD AUTO: 0.1 10E9/L (ref 0–0.7)
EOSINOPHIL NFR BLD AUTO: 0.9 %
ERYTHROCYTE [DISTWIDTH] IN BLOOD BY AUTOMATED COUNT: 12.8 % (ref 10–15)
ERYTHROCYTE [DISTWIDTH] IN BLOOD BY AUTOMATED COUNT: 12.9 % (ref 10–15)
GFR SERPL CREATININE-BSD FRML MDRD: 82 ML/MIN/1.7M2
GFR SERPL CREATININE-BSD FRML MDRD: >90 ML/MIN/1.7M2
GLUCOSE SERPL-MCNC: 74 MG/DL (ref 70–99)
GLUCOSE UR STRIP-MCNC: NEGATIVE MG/DL
HCT VFR BLD AUTO: 24.5 % (ref 35–47)
HCT VFR BLD AUTO: 25.7 % (ref 35–47)
HGB BLD-MCNC: 8.1 G/DL (ref 11.7–15.7)
HGB BLD-MCNC: 8.4 G/DL (ref 11.7–15.7)
HGB UR QL STRIP: ABNORMAL
IMM GRANULOCYTES # BLD: 0.1 10E9/L (ref 0–0.4)
IMM GRANULOCYTES NFR BLD: 1.2 %
KETONES UR STRIP-MCNC: NEGATIVE MG/DL
LEUKOCYTE ESTERASE UR QL STRIP: ABNORMAL
LIPASE SERPL-CCNC: 53 U/L (ref 73–393)
LYMPHOCYTES # BLD AUTO: 1.9 10E9/L (ref 0.8–5.3)
LYMPHOCYTES NFR BLD AUTO: 25.2 %
MCH RBC QN AUTO: 28.4 PG (ref 26.5–33)
MCH RBC QN AUTO: 28.5 PG (ref 26.5–33)
MCHC RBC AUTO-ENTMCNC: 32.7 G/DL (ref 31.5–36.5)
MCHC RBC AUTO-ENTMCNC: 33.1 G/DL (ref 31.5–36.5)
MCV RBC AUTO: 86 FL (ref 78–100)
MCV RBC AUTO: 87 FL (ref 78–100)
MONOCYTES # BLD AUTO: 0.4 10E9/L (ref 0–1.3)
MONOCYTES NFR BLD AUTO: 5.5 %
NEUTROPHILS # BLD AUTO: 5.1 10E9/L (ref 1.6–8.3)
NEUTROPHILS NFR BLD AUTO: 67.1 %
NITRATE UR QL: NEGATIVE
NRBC # BLD AUTO: 0 10*3/UL
NRBC BLD AUTO-RTO: 0 /100
PH UR STRIP: 7.5 PH (ref 5–7)
PLATELET # BLD AUTO: 245 10E9/L (ref 150–450)
PLATELET # BLD AUTO: 260 10E9/L (ref 150–450)
POTASSIUM SERPL-SCNC: 3.7 MMOL/L (ref 3.4–5.3)
PROT SERPL-MCNC: 5.7 G/DL (ref 6.8–8.8)
RBC # BLD AUTO: 2.84 10E12/L (ref 3.8–5.2)
RBC # BLD AUTO: 2.96 10E12/L (ref 3.8–5.2)
RBC #/AREA URNS AUTO: 3 /HPF (ref 0–2)
SODIUM SERPL-SCNC: 145 MMOL/L (ref 133–144)
SOURCE: ABNORMAL
SP GR UR STRIP: 1 (ref 1–1.03)
SQUAMOUS #/AREA URNS AUTO: 5 /HPF (ref 0–1)
TRANS CELLS #/AREA URNS HPF: <1 /HPF (ref 0–1)
UROBILINOGEN UR STRIP-MCNC: NORMAL MG/DL (ref 0–2)
WBC # BLD AUTO: 7.6 10E9/L (ref 4–11)
WBC # BLD AUTO: 8.7 10E9/L (ref 4–11)
WBC #/AREA URNS AUTO: 19 /HPF (ref 0–5)

## 2018-08-03 PROCEDURE — 36415 COLL VENOUS BLD VENIPUNCTURE: CPT | Performed by: EMERGENCY MEDICINE

## 2018-08-03 PROCEDURE — 85025 COMPLETE CBC W/AUTO DIFF WBC: CPT | Performed by: STUDENT IN AN ORGANIZED HEALTH CARE EDUCATION/TRAINING PROGRAM

## 2018-08-03 PROCEDURE — 84450 TRANSFERASE (AST) (SGOT): CPT | Performed by: EMERGENCY MEDICINE

## 2018-08-03 PROCEDURE — 25000128 H RX IP 250 OP 636: Performed by: STUDENT IN AN ORGANIZED HEALTH CARE EDUCATION/TRAINING PROGRAM

## 2018-08-03 PROCEDURE — 99285 EMERGENCY DEPT VISIT HI MDM: CPT | Mod: GC | Performed by: EMERGENCY MEDICINE

## 2018-08-03 PROCEDURE — 83690 ASSAY OF LIPASE: CPT | Performed by: STUDENT IN AN ORGANIZED HEALTH CARE EDUCATION/TRAINING PROGRAM

## 2018-08-03 PROCEDURE — 80053 COMPREHEN METABOLIC PANEL: CPT | Performed by: STUDENT IN AN ORGANIZED HEALTH CARE EDUCATION/TRAINING PROGRAM

## 2018-08-03 PROCEDURE — 99285 EMERGENCY DEPT VISIT HI MDM: CPT | Mod: 25 | Performed by: EMERGENCY MEDICINE

## 2018-08-03 PROCEDURE — 25000132 ZZH RX MED GY IP 250 OP 250 PS 637: Performed by: STUDENT IN AN ORGANIZED HEALTH CARE EDUCATION/TRAINING PROGRAM

## 2018-08-03 PROCEDURE — 12000028 ZZH R&B OB UMMC

## 2018-08-03 PROCEDURE — 25000131 ZZH RX MED GY IP 250 OP 636 PS 637: Performed by: STUDENT IN AN ORGANIZED HEALTH CARE EDUCATION/TRAINING PROGRAM

## 2018-08-03 PROCEDURE — 82565 ASSAY OF CREATININE: CPT | Performed by: EMERGENCY MEDICINE

## 2018-08-03 PROCEDURE — 96365 THER/PROPH/DIAG IV INF INIT: CPT | Performed by: EMERGENCY MEDICINE

## 2018-08-03 PROCEDURE — 96375 TX/PRO/DX INJ NEW DRUG ADDON: CPT | Performed by: EMERGENCY MEDICINE

## 2018-08-03 PROCEDURE — 85027 COMPLETE CBC AUTOMATED: CPT | Performed by: EMERGENCY MEDICINE

## 2018-08-03 PROCEDURE — 81001 URINALYSIS AUTO W/SCOPE: CPT | Performed by: STUDENT IN AN ORGANIZED HEALTH CARE EDUCATION/TRAINING PROGRAM

## 2018-08-03 PROCEDURE — 84460 ALANINE AMINO (ALT) (SGPT): CPT | Performed by: EMERGENCY MEDICINE

## 2018-08-03 RX ORDER — SODIUM CHLORIDE, SODIUM LACTATE, POTASSIUM CHLORIDE, CALCIUM CHLORIDE 600; 310; 30; 20 MG/100ML; MG/100ML; MG/100ML; MG/100ML
10-125 INJECTION, SOLUTION INTRAVENOUS CONTINUOUS
Status: DISCONTINUED | OUTPATIENT
Start: 2018-08-03 | End: 2018-08-03

## 2018-08-03 RX ORDER — ACETAMINOPHEN 325 MG/1
650 TABLET ORAL EVERY 4 HOURS PRN
Status: DISCONTINUED | OUTPATIENT
Start: 2018-08-03 | End: 2018-08-05 | Stop reason: HOSPADM

## 2018-08-03 RX ORDER — LIDOCAINE 40 MG/G
CREAM TOPICAL
Status: DISCONTINUED | OUTPATIENT
Start: 2018-08-03 | End: 2018-08-05 | Stop reason: HOSPADM

## 2018-08-03 RX ORDER — MAGNESIUM SULFATE HEPTAHYDRATE 40 MG/ML
4 INJECTION, SOLUTION INTRAVENOUS
Status: DISCONTINUED | OUTPATIENT
Start: 2018-08-03 | End: 2018-08-03

## 2018-08-03 RX ORDER — LABETALOL HYDROCHLORIDE 5 MG/ML
40 INJECTION, SOLUTION INTRAVENOUS EVERY 10 MIN PRN
Status: DISCONTINUED | OUTPATIENT
Start: 2018-08-03 | End: 2018-08-03

## 2018-08-03 RX ORDER — ONDANSETRON 2 MG/ML
4 INJECTION INTRAMUSCULAR; INTRAVENOUS EVERY 6 HOURS PRN
Status: DISCONTINUED | OUTPATIENT
Start: 2018-08-03 | End: 2018-08-05 | Stop reason: HOSPADM

## 2018-08-03 RX ORDER — MAGNESIUM SULFATE IN WATER 40 MG/ML
2 INJECTION, SOLUTION INTRAVENOUS CONTINUOUS
Status: DISCONTINUED | OUTPATIENT
Start: 2018-08-03 | End: 2018-08-04

## 2018-08-03 RX ORDER — NALOXONE HYDROCHLORIDE 0.4 MG/ML
.1-.4 INJECTION, SOLUTION INTRAMUSCULAR; INTRAVENOUS; SUBCUTANEOUS
Status: DISCONTINUED | OUTPATIENT
Start: 2018-08-03 | End: 2018-08-05 | Stop reason: HOSPADM

## 2018-08-03 RX ORDER — MAGNESIUM SULFATE HEPTAHYDRATE 40 MG/ML
4 INJECTION, SOLUTION INTRAVENOUS ONCE
Status: COMPLETED | OUTPATIENT
Start: 2018-08-03 | End: 2018-08-03

## 2018-08-03 RX ORDER — SIMETHICONE 80 MG
80 TABLET,CHEWABLE ORAL 4 TIMES DAILY
Status: DISCONTINUED | OUTPATIENT
Start: 2018-08-03 | End: 2018-08-05 | Stop reason: HOSPADM

## 2018-08-03 RX ORDER — OXYCODONE HYDROCHLORIDE 5 MG/1
5-10 TABLET ORAL
Status: DISCONTINUED | OUTPATIENT
Start: 2018-08-03 | End: 2018-08-05 | Stop reason: HOSPADM

## 2018-08-03 RX ORDER — AMOXICILLIN 250 MG
1 CAPSULE ORAL 2 TIMES DAILY
Status: DISCONTINUED | OUTPATIENT
Start: 2018-08-03 | End: 2018-08-05 | Stop reason: HOSPADM

## 2018-08-03 RX ORDER — SODIUM CHLORIDE, SODIUM LACTATE, POTASSIUM CHLORIDE, CALCIUM CHLORIDE 600; 310; 30; 20 MG/100ML; MG/100ML; MG/100ML; MG/100ML
10-125 INJECTION, SOLUTION INTRAVENOUS CONTINUOUS
Status: DISCONTINUED | OUTPATIENT
Start: 2018-08-03 | End: 2018-08-05 | Stop reason: HOSPADM

## 2018-08-03 RX ORDER — NIFEDIPINE 30 MG/1
30 TABLET, EXTENDED RELEASE ORAL DAILY
Status: DISCONTINUED | OUTPATIENT
Start: 2018-08-03 | End: 2018-08-05 | Stop reason: HOSPADM

## 2018-08-03 RX ORDER — LABETALOL HYDROCHLORIDE 5 MG/ML
80 INJECTION, SOLUTION INTRAVENOUS EVERY 10 MIN PRN
Status: DISCONTINUED | OUTPATIENT
Start: 2018-08-03 | End: 2018-08-05 | Stop reason: HOSPADM

## 2018-08-03 RX ORDER — NIFEDIPINE 10 MG/1
10 CAPSULE ORAL
Status: DISCONTINUED | OUTPATIENT
Start: 2018-08-03 | End: 2018-08-05 | Stop reason: HOSPADM

## 2018-08-03 RX ORDER — LABETALOL HYDROCHLORIDE 5 MG/ML
40 INJECTION, SOLUTION INTRAVENOUS EVERY 10 MIN PRN
Status: DISCONTINUED | OUTPATIENT
Start: 2018-08-03 | End: 2018-08-05 | Stop reason: HOSPADM

## 2018-08-03 RX ORDER — CALCIUM GLUCONATE 94 MG/ML
1 INJECTION, SOLUTION INTRAVENOUS
Status: DISCONTINUED | OUTPATIENT
Start: 2018-08-03 | End: 2018-08-03

## 2018-08-03 RX ORDER — LABETALOL HYDROCHLORIDE 5 MG/ML
20 INJECTION, SOLUTION INTRAVENOUS ONCE
Status: COMPLETED | OUTPATIENT
Start: 2018-08-03 | End: 2018-08-03

## 2018-08-03 RX ORDER — LABETALOL HYDROCHLORIDE 5 MG/ML
20 INJECTION, SOLUTION INTRAVENOUS EVERY 10 MIN PRN
Status: DISCONTINUED | OUTPATIENT
Start: 2018-08-03 | End: 2018-08-03

## 2018-08-03 RX ORDER — BISACODYL 10 MG
10 SUPPOSITORY, RECTAL RECTAL DAILY PRN
Status: DISCONTINUED | OUTPATIENT
Start: 2018-08-03 | End: 2018-08-05 | Stop reason: HOSPADM

## 2018-08-03 RX ORDER — LABETALOL HYDROCHLORIDE 5 MG/ML
80 INJECTION, SOLUTION INTRAVENOUS EVERY 10 MIN PRN
Status: DISCONTINUED | OUTPATIENT
Start: 2018-08-03 | End: 2018-08-03

## 2018-08-03 RX ORDER — ONDANSETRON 4 MG/1
4 TABLET, ORALLY DISINTEGRATING ORAL EVERY 6 HOURS PRN
Status: DISCONTINUED | OUTPATIENT
Start: 2018-08-03 | End: 2018-08-05 | Stop reason: HOSPADM

## 2018-08-03 RX ORDER — MAGNESIUM SULFATE HEPTAHYDRATE 40 MG/ML
4 INJECTION, SOLUTION INTRAVENOUS
Status: DISCONTINUED | OUTPATIENT
Start: 2018-08-03 | End: 2018-08-05 | Stop reason: HOSPADM

## 2018-08-03 RX ORDER — AMOXICILLIN 250 MG
2 CAPSULE ORAL 2 TIMES DAILY
Status: DISCONTINUED | OUTPATIENT
Start: 2018-08-03 | End: 2018-08-05 | Stop reason: HOSPADM

## 2018-08-03 RX ORDER — LORAZEPAM 2 MG/ML
2 INJECTION INTRAMUSCULAR
Status: DISCONTINUED | OUTPATIENT
Start: 2018-08-03 | End: 2018-08-03

## 2018-08-03 RX ORDER — NIFEDIPINE 10 MG/1
10 CAPSULE ORAL
Status: DISCONTINUED | OUTPATIENT
Start: 2018-08-03 | End: 2018-08-03

## 2018-08-03 RX ORDER — MAGNESIUM SULFATE IN WATER 40 MG/ML
2 INJECTION, SOLUTION INTRAVENOUS CONTINUOUS
Status: DISCONTINUED | OUTPATIENT
Start: 2018-08-03 | End: 2018-08-03

## 2018-08-03 RX ORDER — POLYETHYLENE GLYCOL 3350 17 G/17G
17 POWDER, FOR SOLUTION ORAL DAILY PRN
Status: DISCONTINUED | OUTPATIENT
Start: 2018-08-03 | End: 2018-08-05 | Stop reason: HOSPADM

## 2018-08-03 RX ORDER — CALCIUM GLUCONATE 94 MG/ML
1 INJECTION, SOLUTION INTRAVENOUS
Status: DISCONTINUED | OUTPATIENT
Start: 2018-08-03 | End: 2018-08-05 | Stop reason: HOSPADM

## 2018-08-03 RX ORDER — MAGNESIUM SULFATE HEPTAHYDRATE 500 MG/ML
4 INJECTION, SOLUTION INTRAMUSCULAR; INTRAVENOUS
Status: DISCONTINUED | OUTPATIENT
Start: 2018-08-03 | End: 2018-08-03

## 2018-08-03 RX ORDER — LABETALOL HYDROCHLORIDE 5 MG/ML
20 INJECTION, SOLUTION INTRAVENOUS EVERY 10 MIN PRN
Status: DISCONTINUED | OUTPATIENT
Start: 2018-08-03 | End: 2018-08-05 | Stop reason: HOSPADM

## 2018-08-03 RX ORDER — LORAZEPAM 2 MG/ML
2 INJECTION INTRAMUSCULAR
Status: DISCONTINUED | OUTPATIENT
Start: 2018-08-03 | End: 2018-08-05 | Stop reason: HOSPADM

## 2018-08-03 RX ORDER — MAGNESIUM SULFATE HEPTAHYDRATE 500 MG/ML
4 INJECTION, SOLUTION INTRAMUSCULAR; INTRAVENOUS
Status: DISCONTINUED | OUTPATIENT
Start: 2018-08-03 | End: 2018-08-05 | Stop reason: HOSPADM

## 2018-08-03 RX ADMIN — NIFEDIPINE 30 MG: 30 TABLET, FILM COATED, EXTENDED RELEASE ORAL at 18:07

## 2018-08-03 RX ADMIN — ACETAMINOPHEN 650 MG: 325 TABLET, FILM COATED ORAL at 17:53

## 2018-08-03 RX ADMIN — SIMETHICONE CHEW TAB 80 MG 80 MG: 80 TABLET ORAL at 17:53

## 2018-08-03 RX ADMIN — MAGNESIUM SULFATE IN WATER 2 G/HR: 40 INJECTION, SOLUTION INTRAVENOUS at 17:53

## 2018-08-03 RX ADMIN — Medication 20 MG: at 14:04

## 2018-08-03 RX ADMIN — Medication 20 MG: at 14:46

## 2018-08-03 RX ADMIN — MAGNESIUM SULFATE IN WATER 4 G: 40 INJECTION, SOLUTION INTRAVENOUS at 14:32

## 2018-08-03 RX ADMIN — ACETAMINOPHEN 650 MG: 325 TABLET, FILM COATED ORAL at 23:20

## 2018-08-03 RX ADMIN — OXYCODONE HYDROCHLORIDE 5 MG: 5 TABLET ORAL at 19:32

## 2018-08-03 RX ADMIN — SODIUM CHLORIDE, POTASSIUM CHLORIDE, SODIUM LACTATE AND CALCIUM CHLORIDE 75 ML/HR: 600; 310; 30; 20 INJECTION, SOLUTION INTRAVENOUS at 17:53

## 2018-08-03 ASSESSMENT — ENCOUNTER SYMPTOMS
ARTHRALGIAS: 0
HEADACHES: 0
COLOR CHANGE: 0
CHILLS: 0
CONFUSION: 0
CHEST TIGHTNESS: 0
FATIGUE: 0
NECK STIFFNESS: 0
FEVER: 0
DIFFICULTY URINATING: 0
SHORTNESS OF BREATH: 0
EYE REDNESS: 0
ABDOMINAL PAIN: 0

## 2018-08-03 NOTE — TELEPHONE ENCOUNTER
Spoke with Tiffanie who was discharged from  on Wednesday. She states that today she woke up not feeling well at all. She took her blood pressure and it was 185/105. Nurse advised coming to [facility] ED right away. Patient agreeable and she will have her  bring her.

## 2018-08-03 NOTE — IP AVS SNAPSHOT
MRN:[id number]                      After Visit Summary   8/3/2018    Rona Krishnamurthy    MRN: [id number]           Thank you!     Thank you for choosing [facility] for your care. Our goal is always to provide you with excellent care. Hearing back from our patients is one way we can continue to improve our services. Please take a few minutes to complete the written survey that you may receive in the mail after you visit with us. Thank you!        Patient Information     Date Of Birth          1980        About your hospital stay     You were admitted on:  August 3, 2018 [facility]    You were discharged on:  2018        Reason for your hospital stay       Maternity care                  Who to Call     For medical emergencies, please call 911.  For non-urgent questions about your medical care, please call your primary care provider or clinic, [phone]          Attending Provider     Provider Specialty    Lidia Stock MD Emergency Medicine    [facility], Crista Mitchell MD OB/Gyn       Primary Care Provider Office Phone # Fax #    Dahlia Paula Noriega [phone]      After Care Instructions     Activity       Review discharge instructions            Diet       Resume previous diet            Discharge Instructions - Hypertensive disorders patients       High Blood pressure patients to follow up in clinic or via home care within one week for a blood pressure check                  Further instructions from your care team       Postop  Birth Instructions    Activity       Do not lift more than 10 pounds for 6 weeks after surgery.  Ask family and friends for help when you need it.    No driving until you have stopped taking your pain medications (usually two weeks after surgery).    No heavy exercise or activity for 6 weeks.  Don't do anything that will put a strain on your surgery site.    Don't strain when using the  toilet.  Your care team may prescribe a stool softener if you have problems with your bowel movements.     To care for your incision:       Keep the incision clean and dry.    Do not soak your incision in water. No swimming or hot tubs until it has fully healed. You may soak in the bathtub if the water level is below your incision.    Do not use peroxide, gel, cream, lotion, or ointment on your incision.    Adjust your clothes to avoid pressure on your surgery site (check the elastic in your underwear for example).     You may see a small amount of clear or pink drainage and this is normal.  Check with your health care provider:       If the drainage increases or has an odor.    If the incision reddens, you have swelling, or develop a rash.    If you have increased pain and the medicine we prescribed doesn't help.    If you have a fever above 100.4 F (38 C) with or without chills when placing thermometer under your tongue.   The area around your incision (surgery wound), will feel numb.  This is normal. The numbness should go away in less than a year.     Keep your hands clean:  Always wash your hands before touching your incision (surgery wound). This helps reduce your risk of infection. If your hands aren't dirty, you may use an alcohol hand-rub to clean your hands. Keep your nails clean and short.    Call your healthcare provider if you have any of these symptoms:       You soak a sanitary pad with blood within 1 hour, or you see blood clots larger than a golf ball.    Bleeding that lasts more than 6 weeks.    Vaginal discharge that smells bad.    Severe pain, cramping or tenderness in your lower belly area.    A need to urinate more frequently (use the toilet more often), more urgently (use the toilet very quickly), or it burns when you urinate.    Nausea and vomiting.    Redness, swelling or pain around a vein in your leg.    Problems breastfeeding or a red or painful area on your breast.    Chest pain and  cough or are gasping for air.    Problems with coping with sadness, anxiety or depression. If you have concerns about hurting yourself or the baby, call your provider immediately.      You have questions or concerns after you return home.                  Pending Results     No orders found from 8/1/2018 to 8/4/2018.            Statement of Approval     Ordered          08/05/18 0831  I have reviewed and agree with all the recommendations and orders detailed in this document.  EFFECTIVE NOW     Approved and electronically signed by:  Ce Porras MD             Admission Information     Date & Time Provider Department Dept. Phone    8/3/2018 Crista Maynard MD [facility] [phone]      Your Vitals Were     Blood Pressure Pulse Temperature Respirations Weight Last Period    134/80 78 98.4  F (36.9  C) (Oral) 18 79.9 kg (176 lb 3.2 oz) 10/24/2017    Pulse Oximetry BMI (Body Mass Index)                98% 32.22 kg/m2          MyChart Information     [employer] gives you secure access to your electronic health record. If you see a primary care provider, you can also send messages to your care team and make appointments. If you have questions, please call your primary care clinic.  If you do not have a primary care provider, please call [phone] and they will assist you.        Care EveryWhere ID     This is your Care EveryWhere ID. This could be used by other organizations to access your [facility] medical records  [id number]        Equal Access to Services     BERT ACUNA : Hadii mak Prabhakar, waaxda lusonaadaha, qaybta kaalmakim leija . So [facility] [phone].    ATENCIÓN: Si habla español, tiene a trevizo disposición servicios gratuitos de asistencia lingüística. Llame al [phone].    We comply with applicable federal civil rights laws and [place] laws. We do not discriminate on the basis of race, color, national origin, age, disability, sex, sexual  orientation, or gender identity.               Review of your medicines      START taking        Dose / Directions    NIFEdipine ER 30 MG Tb24   Commonly known as:  ADALAT CC   Used for:  Postpartum hypertension        Dose:  30 mg   Take 1 tablet (30 mg) by mouth daily   Quantity:  30 tablet   Refills:  1         CONTINUE these medicines which have NOT CHANGED        Dose / Directions    acetaminophen 325 MG tablet   Commonly known as:  TYLENOL   Used for:  S/P  section        Dose:  650 mg   Take 2 tablets (650 mg) by mouth every 4 hours as needed for other (multimodal surgical pain management along with NSAIDS and opioid medication as indicated based on pain control and physical function.)   Quantity:  40 tablet   Refills:  0       acetone (Urine) test Strp   Used for:  Gestational diabetes        Test first voiding of the day.   Quantity:  50 each   Refills:  11       blood glucose monitoring test strip   Commonly known as:  no brand specified   Used for:  Gestational diabetes        One Touch Verio test strips.  Test 6 times daily.   Quantity:  200 strip   Refills:  11       * breast pump Misc   Used for:  High-risk pregnancy, elderly primigravida        Dose:  1 each   1 each as needed   Quantity:  1 each   Refills:  0       * breast pump Misc   Used for:  Postpartum care and examination of lactating mother        Dose:  1 each   1 each as needed   Quantity:  1 each   Refills:  0       ferrous sulfate 325 (65 Fe) MG tablet   Commonly known as:  IRON   Used for:  Anemia due to blood loss, acute        Dose:  325 mg   Take 1 tablet (325 mg) by mouth daily (with breakfast)   Quantity:  45 tablet   Refills:  2       ibuprofen 600 MG tablet   Commonly known as:  ADVIL/MOTRIN   Used for:  S/P  section        Dose:  600 mg   Take 1 tablet (600 mg) by mouth every 6 hours as needed for other (carmping)   Quantity:  30 tablet   Refills:  0       oxyCODONE IR 5 MG tablet   Commonly known as:  ROXICODONE    Used for:  S/P  section        Dose:  5-10 mg   Take 1-2 tablets (5-10 mg) by mouth every 3 hours as needed for other (pain control or improvement in physical function. Hold dose for analgesic side effects.)   Quantity:  10 tablet   Refills:  0       prenatal multivitamin plus iron 27-0.8 MG Tabs per tablet        Dose:  1 tablet   Take 1 tablet by mouth daily   Refills:  0       senna-docusate 8.6-50 MG per tablet   Commonly known as:  SENOKOT-S;PERICOLACE   Used for:  S/P  section        Dose:  1-2 tablet   Take 1-2 tablets by mouth 2 times daily as needed for constipation   Quantity:  40 tablet   Refills:  1       * Notice:  This list has 2 medication(s) that are the same as other medications prescribed for you. Read the directions carefully, and ask your doctor or other care provider to review them with you.         Where to get your medicines      These medications were sent to [facility] [place], [place] - [address]  [address] [facility] [address], [facility] [address]     Phone:  [phone]     NIFEdipine ER 30 MG Tb24                Protect others around you: Learn how to safely use, store and throw away your medicines at www.disposemymeds.org.             Medication List: This is a list of all your medications and when to take them. Check marks below indicate your daily home schedule. Keep this list as a reference.      Medications           Morning Afternoon Evening Bedtime As Needed    acetaminophen 325 MG tablet   Commonly known as:  TYLENOL   Take 2 tablets (650 mg) by mouth every 4 hours as needed for other (multimodal surgical pain management along with NSAIDS and opioid medication as indicated based on pain control and physical function.)   Last time this was given:  650 mg on 2018  8:33 AM                                acetone (Urine) test Strp   Test first voiding of the day.                                blood glucose monitoring test strip   Commonly known  as:  no brand specified   One Touch Verio test strips.  Test 6 times daily.                                * breast pump Misc   1 each as needed                                * breast pump Misc   1 each as needed                                ferrous sulfate 325 (65 Fe) MG tablet   Commonly known as:  IRON   Take 1 tablet (325 mg) by mouth daily (with breakfast)                                ibuprofen 600 MG tablet   Commonly known as:  ADVIL/MOTRIN   Take 1 tablet (600 mg) by mouth every 6 hours as needed for other (carmping)                                NIFEdipine ER 30 MG Tb24   Commonly known as:  ADALAT CC   Take 1 tablet (30 mg) by mouth daily   Last time this was given:  30 mg on 8/4/2018  6:03 PM                                oxyCODONE IR 5 MG tablet   Commonly known as:  ROXICODONE   Take 1-2 tablets (5-10 mg) by mouth every 3 hours as needed for other (pain control or improvement in physical function. Hold dose for analgesic side effects.)   Last time this was given:  5 mg on 8/4/2018 11:32 PM                                prenatal multivitamin plus iron 27-0.8 MG Tabs per tablet   Take 1 tablet by mouth daily                                senna-docusate 8.6-50 MG per tablet   Commonly known as:  SENOKOT-S;PERICOLACE   Take 1-2 tablets by mouth 2 times daily as needed for constipation   Last time this was given:  1 tablet on 8/5/2018  8:33 AM                                * Notice:  This list has 2 medication(s) that are the same as other medications prescribed for you. Read the directions carefully, and ask your doctor or other care provider to review them with you.

## 2018-08-03 NOTE — IP AVS SNAPSHOT
[facility]    [address] [address]    Phone:  [phone]                                       After Visit Summary   8/3/2018    Rona Krishnamurthy    MRN: [id number]           After Visit Summary Signature Page     I have received my discharge instructions, and my questions have been answered. I have discussed any challenges I see with this plan with the nurse or doctor.    ..........................................................................................................................................  Patient/Patient Representative Signature      ..........................................................................................................................................  Patient Representative Print Name and Relationship to Patient    ..................................................               ................................................  Date                                            Time    ..........................................................................................................................................  Reviewed by Signature/Title    ...................................................              ..............................................  Date                                                            Time

## 2018-08-03 NOTE — ED NOTES
[facility], [place]   ED Nurse to Floor Handoff     Rona Krishnamurthy is a 37 year old female who speaks English and lives with a spouse,  in a home  They arrived in the ED by private car from home (has  in NICU).    ED Chief Complaint: Hypertension (had a  last , now feeling nauseated, headache, blood pressure at home 185/105)    ED Dx;   Final diagnoses:   Postpartum hypertension         Needed?: No    Allergies:   Allergies   Allergen Reactions     Nkda [No Known Drug Allergies]    .  Past Medical Hx:   Past Medical History:   Diagnosis Date     Atopic dermatitis 2005     Diabetes (H)     gestational, diet controlled     Hypertension     gestational      Baseline Mental status: WDL  Current Mental Status changes: at basesline    Infection present or suspected this encounter: no  Sepsis suspected: No  Isolation type: No active isolations     Activity level - Baseline/Home:  Independent  Activity Level - Current:   Independent    Bariatric equipment needed?: No    In the ED these meds were given:   Medications   magnesium sulfate 4 g in 100 mL sterile water (premade) (not administered)   labetalol (NORMODYNE/TRANDATE) injection 20 mg (20 mg Intravenous Given 8/3/18 1404)       Drips running?  No    Home pump  No    Current LDAs  Peripheral IV 18 Left Hand (Active)   Site Assessment WDL 8/3/2018 12:51 PM   Line Status Saline locked 8/3/2018 12:51 PM   Number of days:0       Airway - Adult/Peds (Active)   Number of days:5       Incision/Surgical Site 18 Bilateral Abdomen (Active)   Number of days:5       Labs results:   Labs Ordered and Resulted from Time of ED Arrival Up to the Time of Departure from the ED   CBC WITH PLATELETS DIFFERENTIAL - Abnormal; Notable for the following:        Result Value    RBC Count 2.84 (*)     Hemoglobin 8.1 (*)     Hematocrit 24.5 (*)     All other components within normal limits   COMPREHENSIVE METABOLIC  PANEL - Abnormal; Notable for the following:     Sodium 145 (*)     Chloride 111 (*)     Urea Nitrogen 5 (*)     Calcium 8.4 (*)     Albumin 2.0 (*)     Protein Total 5.7 (*)     ALT 52 (*)     All other components within normal limits   ROUTINE UA WITH MICROSCOPIC - Abnormal; Notable for the following:     Blood Urine Large (*)     pH Urine 7.5 (*)     Leukocyte Esterase Urine Moderate (*)     WBC Urine 19 (*)     RBC Urine 3 (*)     Squamous Epithelial /HPF Urine 5 (*)     All other components within normal limits   LIPASE - Abnormal; Notable for the following:     Lipase 53 (*)     All other components within normal limits   PATIENT CARE ORDER       Imaging Studies: No results found for this or any previous visit (from the past 24 hour(s)).    Recent vital signs:   BP (!) 177/94  Pulse 70  Temp 98  F (36.7  C) (Oral)  Resp 18  LMP 10/24/2017  SpO2 100%    Cardiac Rhythm: Normal Sinus  Pt needs tele? No  Skin/wound Issues: None    Code Status: Full Code    Pain control: pt had none    Nausea control: pt had none    Abnormal labs/tests/findings requiring intervention: Hgb 8.1 g/dL; ALT 52    Family present during ED course? Yes   Family Comments/Social Situation comments:  with pt    Tasks needing completion: None  Nkechi Gibbons  [facility]-- [id number] [place] [facility]  [phone] [facility]

## 2018-08-03 NOTE — H&P
"Gynecology Consult Note    Patient Summary:  Rona Krishnamurthy is a 37 year old female seen at the request of Dr. Stock of the ED. She is PPD#5 and here with severe-range blood pressures starting today.      HPI: Ms Krishnamurthy is PPD#5 PLTCS for arrest of descent and cat II FHT. Her pregnancy was complicated by gHTN and GDMA1. Her surgery was complicated by a left sided cervical extension and 1120 mL blood loss. Her infant is in the NICU for blood glucose control.     Yesterday and today she had increasingly uncomfortabel constipation. She took an enema with some results but continued to feel distended and unwell. She'd been taking tej BPs antepartum with a  Home monitor and thought to check. She had 180s/90s. Called clinic and was told to come to the ED.     ROS: Otherwise on ROS she notes no headache or visual changes, rare SOB, no chest pain, no RUQ pain but general distension. Continues to pass gas despite distension. Not nauseous. Feels edema is \"up to my thighs\" and has worsened over the past day or two. She feels shaky. Still voiding urine. Pumping for infant in NICU  PMH:     Past Medical History:   Diagnosis Date     Atopic dermatitis 2005     Diabetes (H)     gestational, diet controlled     Hypertension     gestational       PSHx:   Past Surgical History:   Procedure Laterality Date     BREAST SURGERY  1998    fibroadenoma removal      SECTION N/A 2018    Procedure:  SECTION;;  Surgeon: Demetra Evangelista MD;  Location:  L+D      TOOTH EXTRACTION W/FORCEP         Medications:   Current Facility-Administered Medications   Medication     labetalol (NORMODYNE/TRANDATE) injection 20 mg     magnesium sulfate 4 g in 100 mL sterile water (premade)     Current Outpatient Prescriptions   Medication     acetaminophen (TYLENOL) 325 MG tablet     ferrous sulfate (IRON) 325 (65 Fe) MG tablet     ibuprofen (ADVIL/MOTRIN) 600 MG tablet     oxyCODONE IR (ROXICODONE) 5 MG tablet     " Prenatal Vit-Fe Fumarate-FA (PRENATAL MULTIVITAMIN PLUS IRON) 27-0.8 MG TABS per tablet     senna-docusate (SENOKOT-S;PERICOLACE) 8.6-50 MG per tablet     acetone, Urine, test STRP     blood glucose monitoring (NO BRAND SPECIFIED) test strip     Misc. Devices (BREAST PUMP) MISC     Misc. Devices (BREAST PUMP) MISC       No current facility-administered medications on file prior to encounter.   Current Outpatient Prescriptions on File Prior to Encounter:  acetaminophen (TYLENOL) 325 MG tablet Take 2 tablets (650 mg) by mouth every 4 hours as needed for other (multimodal surgical pain management along with NSAIDS and opioid medication as indicated based on pain control and physical function.)   ferrous sulfate (IRON) 325 (65 Fe) MG tablet Take 1 tablet (325 mg) by mouth daily (with breakfast)   ibuprofen (ADVIL/MOTRIN) 600 MG tablet Take 1 tablet (600 mg) by mouth every 6 hours as needed for other (carmping)   oxyCODONE IR (ROXICODONE) 5 MG tablet Take 1-2 tablets (5-10 mg) by mouth every 3 hours as needed for other (pain control or improvement in physical function. Hold dose for analgesic side effects.)   Prenatal Vit-Fe Fumarate-FA (PRENATAL MULTIVITAMIN PLUS IRON) 27-0.8 MG TABS per tablet Take 1 tablet by mouth daily   senna-docusate (SENOKOT-S;PERICOLACE) 8.6-50 MG per tablet Take 1-2 tablets by mouth 2 times daily as needed for constipation   acetone, Urine, test STRP Test first voiding of the day.   blood glucose monitoring (NO BRAND SPECIFIED) test strip One Touch Verio test strips.  Test 6 times daily.   Misc. Devices (BREAST PUMP) MISC 1 each as needed   Misc. Devices (BREAST PUMP) MISC 1 each as needed       Allergies:      Allergies   Allergen Reactions     Nkda [No Known Drug Allergies]        Social History:   Social History     Social History     Marital status:      Spouse name: Tj Krishnamurthy     Number of children: N/A     Years of education: N/A     Occupational History     Not on file.      Social History Main Topics     Smoking status: Never Smoker     Smokeless tobacco: Never Used     Alcohol use No     Drug use: Not on file      Comment: Occasional- before pregnancy     Sexual activity: Yes     Partners: Male     Birth control/ protection: None     Other Topics Concern     Not on file     Social History Narrative    ** Merged History Encounter **         How much exercise per week? 1-2 exercise and daily activities    How much calcium per day? In foods and prenatals       How much caffeine per day? 100 150 mg a day    How much vitamin D per day? Un prenatals    Do you/your family wear seatbelts?  Yes    Do you/your family use safety helmets? Yes    Do you/your family use sunscreen? Yes    Do you/your family keep firearms in the home? No    Do you/your family have a smoke detector(s)? Yes        Reviewed linda storm 12-             Social History     Social History     Marital status:      Spouse name: Tj Krishnamurthy     Number of children: N/A     Years of education: N/A     Social History Main Topics     Smoking status: Never Smoker     Smokeless tobacco: Never Used     Alcohol use No     Drug use: None      Comment: Occasional- before pregnancy     Sexual activity: Yes     Partners: Male     Birth control/ protection: None     Other Topics Concern     None     Social History Narrative    ** Merged History Encounter **         How much exercise per week? 1-2 exercise and daily activities    How much calcium per day? In foods and prenatals       How much caffeine per day? 100 150 mg a day    How much vitamin D per day? Un prenatals    Do you/your family wear seatbelts?  Yes    Do you/your family use safety helmets? Yes    Do you/your family use sunscreen? Yes    Do you/your family keep firearms in the home? No    Do you/your family have a smoke detector(s)? Yes        Reviewed linda storm 12-               Physical Exam:   Vitals:    08/03/18 1005 08/03/18 1348   BP: 180/75 (!)  177/94   Pulse: 70    Resp: 18 18   Temp: 98  F (36.7  C) 98  F (36.7  C)   TempSrc: Oral Oral   SpO2: 97% 100%      Gen:  HEENT: Neck supple, no cervical lymphadenopathy; oral MMM  CV: RRR, no murmurs/rubs/gallops; peripheral pulses 2+ bilaterally  Pulm: CTAB, no increased work of breathing, no wheezing/rhonchi/crackles  Abd: non-tender, significantly distended, +BS  Extremities: non-tender, no erythema; 3+ pitting edema to knees. 4++ reflexes LE, 2 beats clonus, 3+ upper extremities    Labs:  Results for MARIELLE SOLER (MRN [id number]) as of 8/3/2018 15:02   8/3/2018 12:47   ALT 52 (H)   AST 34   Results for MARIELLE SOLER (MRN [id number]) as of 8/3/2018 15:02   8/3/2018 12:47   Creatinine 0.62   Results for MARIELLE SOLER (MRN [id number]) as of 8/3/2018 15:02   8/3/2018 12:47   WBC 7.6   Hemoglobin 8.1 (L)   Hematocrit 24.5 (L)   Platelet Count 245     A&P: 37 year old  PPD#5 from PLTCS for arrest of descent, now with postpartum preeclampsia with severe features.     *While she is in the ED: Please obtain blood pressures every 15 minutes until she goes to the floor. If blood pressures are greater than 160 SBP -OR- greater than 110 DBP on two occasions 15 minutes apart, please administer 20 mg labetalol IV* Feel free to contact me at  for advice if you have concerns or doubts    - Admit to Postpartum/Gyn service   - 4g Mg load administered in ED for seizure prophylaxis, will start 2g/h thereafter, continue 24h   - Also given 20 mg labetalol twice in ED with minimal improvement  - Plan to add 30 mg nifedipine XL after getting to floor, will likely need PO agent for blood pressure management  - Will follow blood pressures as per preeclampsia management   - 1900 HELLP labs to follow LFTs, which are slightly elevated.     Thank you for this consult.  Please do not hesitate to contact us with concerns or questions. Discussed with Dr. Maynard.       Rosangela Burris MD PGY4  OB/Gyn  8/3/2018, 2:01  PM    [facility] staff:  Appreciate note by Dr. Burris. I have reviewed, edited, and agree with the note.      Crista Maynard MD, FACOG

## 2018-08-03 NOTE — PROVIDER NOTIFICATION
08/03/18 1712   Provider Notification   Provider Name/Title G 2 resident    Method of Notification Electronic Page   Request Evaluate in Person   Notification Reason Other   To inform patient is here on [facility] and needs provider to see her..

## 2018-08-03 NOTE — ED NOTES
Gave birth 1 week ago, baby in NICU due blood sugar problems (mom (patient) had gestational diabetes).

## 2018-08-03 NOTE — PHARMACY-ADMISSION MEDICATION HISTORY
Admission Medication History status for the 8/3/2018 admission is complete.  See EPIC admission navigator for Prior to Admission medications.    Medication history sources:  Patient     Medication history source reliability: Good    Medication adherence:  Good    Changes made to PTA medication list (reason)  Added: None  Deleted: None  Changed: None    Additional medication history information (including reliability of information, actions taken by pharmacist): None    Time spent in this activity: 10 minutes     Medication history completed by: Guanakito Mejia, ChantalD    Prior to Admission medications    Medication Sig Last Dose Taking? Auth Provider   acetaminophen (TYLENOL) 325 MG tablet Take 2 tablets (650 mg) by mouth every 4 hours as needed for other (multimodal surgical pain management along with NSAIDS and opioid medication as indicated based on pain control and physical function.) 8/3/2018 at Unknown time Yes Isabella Mckeon MD   ferrous sulfate (IRON) 325 (65 Fe) MG tablet Take 1 tablet (325 mg) by mouth daily (with breakfast) 8/2/2018 at Unknown time Yes Isabella Mckeon MD   ibuprofen (ADVIL/MOTRIN) 600 MG tablet Take 1 tablet (600 mg) by mouth every 6 hours as needed for other (carmping) 8/3/2018 at Unknown time Yes Isabella Mckeon MD   oxyCODONE IR (ROXICODONE) 5 MG tablet Take 1-2 tablets (5-10 mg) by mouth every 3 hours as needed for other (pain control or improvement in physical function. Hold dose for analgesic side effects.) 8/3/2018 at Unknown time Yes Isabella Mckeon MD   Prenatal Vit-Fe Fumarate-FA (PRENATAL MULTIVITAMIN PLUS IRON) 27-0.8 MG TABS per tablet Take 1 tablet by mouth daily 8/2/2018 at Unknown time Yes Reported, Patient   senna-docusate (SENOKOT-S;PERICOLACE) 8.6-50 MG per tablet Take 1-2 tablets by mouth 2 times daily as needed for constipation Past Week at Unknown time Yes Isabella Mckeon MD   acetone, Urine, test STRP Test  first voiding of the day.   Angela Davis APRN CNM   blood glucose monitoring (NO BRAND SPECIFIED) test strip One Touch Verio test strips.  Test 6 times daily.   Alysha Huerta APRN CNM   Misc. Devices (BREAST PUMP) MISC 1 each as needed   Manny Nicole APRN CNM   Misc. Devices (BREAST PUMP) MISC 1 each as needed   Salud Alfaro CNM

## 2018-08-03 NOTE — ED NOTES
[facility], [place]   ED Nurse to Floor Handoff     Rona Krishnamurthy is a 37 year old female who speaks English and lives with a spouse,  in a home  They arrived in the ED by car from home    ED Chief Complaint: Hypertension (had a  last , now feeling nauseated, headache, blood pressure at home 185/105)    ED Dx;   Final diagnoses:   Postpartum hypertension         Needed?: No    Allergies:   Allergies   Allergen Reactions     Nkda [No Known Drug Allergies]    .  Past Medical Hx:   Past Medical History:   Diagnosis Date     Atopic dermatitis 2005     Diabetes (H)     gestational, diet controlled     Hypertension     gestational      Baseline Mental status: WDL  Current Mental Status changes: at basesline    Infection present or suspected this encounter: no  Sepsis suspected: No  Isolation type: No active isolations     Activity level - Baseline/Home:  Independent  Activity Level - Current:   Independent    Bariatric equipment needed?: No    In the ED these meds were given:   Medications   labetalol (NORMODYNE/TRANDATE) injection 20 mg (20 mg Intravenous Given 8/3/18 1404)   magnesium sulfate 4 g in 100 mL sterile water (premade) (0 g Intravenous Stopped 8/3/18 1515)   labetalol (NORMODYNE/TRANDATE) injection 20 mg (20 mg Intravenous Given 8/3/18 1446)       Drips running?  No    Home pump  No    Current LDAs  Peripheral IV 18 Left Hand (Active)   Site Assessment WDL 8/3/2018 12:51 PM   Line Status Saline locked 8/3/2018 12:51 PM   Number of days:0       Airway - Adult/Peds (Active)   Number of days:5       Incision/Surgical Site 18 Bilateral Abdomen (Active)   Number of days:5       Labs results:   Labs Ordered and Resulted from Time of ED Arrival Up to the Time of Departure from the ED   CBC WITH PLATELETS DIFFERENTIAL - Abnormal; Notable for the following:        Result Value    RBC Count 2.84 (*)     Hemoglobin 8.1 (*)     Hematocrit 24.5  (*)     All other components within normal limits   COMPREHENSIVE METABOLIC PANEL - Abnormal; Notable for the following:     Sodium 145 (*)     Chloride 111 (*)     Urea Nitrogen 5 (*)     Calcium 8.4 (*)     Albumin 2.0 (*)     Protein Total 5.7 (*)     ALT 52 (*)     All other components within normal limits   ROUTINE UA WITH MICROSCOPIC - Abnormal; Notable for the following:     Blood Urine Large (*)     pH Urine 7.5 (*)     Leukocyte Esterase Urine Moderate (*)     WBC Urine 19 (*)     RBC Urine 3 (*)     Squamous Epithelial /HPF Urine 5 (*)     All other components within normal limits   LIPASE - Abnormal; Notable for the following:     Lipase 53 (*)     All other components within normal limits   PATIENT CARE ORDER   IP ASSIGN PROVIDER TEAM TO TREATMENT TEAM       Imaging Studies: No results found for this or any previous visit (from the past 24 hour(s)).    Recent vital signs:   /79  Pulse 70  Temp 98  F (36.7  C) (Oral)  Resp 20  LMP 10/24/2017  SpO2 99%    Cardiac Rhythm: Normal Sinus  Pt needs tele? No  Skin/wound Issues: recent      Code Status: Full Code    Pain control: pt had none    Nausea control: pt had none    Abnormal labs/tests/findings requiring intervention: see lab and urinalysis      Family present during ED course? Yes   Family Comments/Social Situation comments: patient has baby in NICU    Tasks needing completion: None    Eliezer Mercer, RN  [phone] [place] [facility]  [phone] [facility]

## 2018-08-03 NOTE — ED PROVIDER NOTES
History     Chief Complaint   Patient presents with     Hypertension     had a  last , now feeling nauseated, headache, blood pressure at home 185/105     HPI  Rona Krishnamurthy is a 37 year old female  who is 5 days postop from a  section on 2018.  Her pregnancy was complicated by stational hypertension and diabetes.  She presents today with concerns for hypertension. Upon waking today she was feeling nauseated with a minor headache and back pain, all of which were new for her. She took oxycodone and Gas-X which did not resolve her symptoms.  She subsequently checked her blood pressure at home and it was 185/105 and 185/75 on recheck. She spoke with the OB nurse over the phone who instructed her to come in.  Her  brought her to the hospital and is here with her in the ED.  She has also had light orange colored urine although was not sure if this is due to vaginal discharge.  She has not seen any clots in her urine.  Denies dysuria or oliguria, is urinating with her normal frequency. She was constipated, did not have a bowel movement for 1 week.  She did 2 enemas 2 days ago and subsequently had several large bowel movements and has had some loose stools since.  She is endorsing some abdominal cramping and discomfort since the 2 enemas. She any focal abdominal pain and denies right upper quadrant abdominal pain.  Does report feeling some pressure around her incision when she urinates.  She reports that her bilateral lower extremity edema is the same. She denies changes in her, chills, vision, confusion, seizures, focal neurological deficits, chest pain or palpitations.     Denies significant other medical history aside from gestational hypertension and diabetes.  She took ibuprofen Tylenol and oxycodone today as prescribed when she was discharged.  She took her prenatal vitamin and iron yesterday.      Of note, her baby is still in the NICU due to difficulty controlling  blood sugars she is otherwise healthy.    I have reviewed the Medications, Allergies, Past Medical and Surgical History, and Social History in the Epic system.    Review of Systems   Constitutional: Negative for chills, fatigue and fever.   HENT: Negative for congestion.    Eyes: Negative for redness.   Respiratory: Negative for chest tightness and shortness of breath.    Cardiovascular: Negative for chest pain.   Gastrointestinal: Negative for abdominal pain.   Genitourinary: Negative for difficulty urinating.   Musculoskeletal: Negative for arthralgias and neck stiffness.   Skin: Negative for color change.   Neurological: Negative for headaches.   Psychiatric/Behavioral: Negative for confusion.   All other systems reviewed and are negative.      Past Medical History:   Diagnosis Date     Atopic dermatitis 2005     Diabetes (H)     gestational, diet controlled     Hypertension     gestational       Past Surgical History:   Procedure Laterality Date     BREAST SURGERY  1998    fibroadenoma removal      SECTION N/A 2018    Procedure:  SECTION;;  Surgeon: Demetra Evangelista MD;  Location: [facility] L+D     HC TOOTH EXTRACTION W/FORCEP         Family History   Problem Relation Age of Onset     Breast Cancer Other      Aunt - Dad's sister     Breast Cancer Other      Mom's grandma     Prostate Cancer Father      Hypertension Mother      Preeclampsia Mother      Diabetes Paternal Grandmother      Diabetes Paternal Grandfather      Hypertension Sister      Preeclampsia Sister        Social History   Substance Use Topics     Smoking status: Never Smoker     Smokeless tobacco: Never Used     Alcohol use No       Physical Exam   BP: 180/75  Pulse: 70  Heart Rate: 61  Temp: 98  F (36.7  C)  Resp: 18  SpO2: 97 %      Physical Exam   Constitutional: She is oriented to person, place, and time. No distress.   HENT:   Head: Atraumatic.   Mouth/Throat: Oropharynx is clear and moist. No oropharyngeal  exudate.   Eyes: Pupils are equal, round, and reactive to light. No scleral icterus.   Cardiovascular: Normal heart sounds and intact distal pulses.    Pulmonary/Chest: Breath sounds normal. No respiratory distress.   Abdominal: Soft. Bowel sounds are normal. There is generalized tenderness. There is no rigidity, no rebound, no guarding and no CVA tenderness.       Mild tenderness to palpation throughout abdomen. No focal RUQ tenderness    Musculoskeletal: She exhibits edema. She exhibits no tenderness.   Bilateral 1+ pitting lower extremity edema   Neurological: She is alert and oriented to person, place, and time. She has normal strength. She displays no tremor. No cranial nerve deficit or sensory deficit. She exhibits normal muscle tone. She displays no seizure activity. GCS eye subscore is 4. GCS verbal subscore is 5. GCS motor subscore is 6.   Symmetric hyperreflexia of upper and lower extremities. No clonus.    Skin: Skin is warm. No rash noted. She is not diaphoretic.   Psychiatric: Her behavior is normal. Thought content normal.       ED Course     ED Course     Procedures              Critical Care time:  none             Results for orders placed or performed during the hospital encounter of 08/03/18 (from the past 24 hour(s))   CBC with platelets differential   Result Value Ref Range    WBC 7.6 4.0 - 11.0 10e9/L    RBC Count 2.84 (L) 3.8 - 5.2 10e12/L    Hemoglobin 8.1 (L) 11.7 - 15.7 g/dL    Hematocrit 24.5 (L) 35.0 - 47.0 %    MCV 86 78 - 100 fl    MCH 28.5 26.5 - 33.0 pg    MCHC 33.1 31.5 - 36.5 g/dL    RDW 12.8 10.0 - 15.0 %    Platelet Count 245 150 - 450 10e9/L    Diff Method Automated Method     % Neutrophils 67.1 %    % Lymphocytes 25.2 %    % Monocytes 5.5 %    % Eosinophils 0.9 %    % Basophils 0.1 %    % Immature Granulocytes 1.2 %    Nucleated RBCs 0 0 /100    Absolute Neutrophil 5.1 1.6 - 8.3 10e9/L    Absolute Lymphocytes 1.9 0.8 - 5.3 10e9/L    Absolute Monocytes 0.4 0.0 - 1.3 10e9/L     Absolute Eosinophils 0.1 0.0 - 0.7 10e9/L    Absolute Basophils 0.0 0.0 - 0.2 10e9/L    Abs Immature Granulocytes 0.1 0 - 0.4 10e9/L    Absolute Nucleated RBC 0.0    Comprehensive metabolic panel   Result Value Ref Range    Sodium 145 (H) 133 - 144 mmol/L    Potassium 3.7 3.4 - 5.3 mmol/L    Chloride 111 (H) 94 - 109 mmol/L    Carbon Dioxide 27 20 - 32 mmol/L    Anion Gap 7 3 - 14 mmol/L    Glucose 74 70 - 99 mg/dL    Urea Nitrogen 5 (L) 7 - 30 mg/dL    Creatinine 0.62 0.52 - 1.04 mg/dL    GFR Estimate >90 >60 mL/min/1.7m2    GFR Estimate If Black >90 >60 mL/min/1.7m2    Calcium 8.4 (L) 8.5 - 10.1 mg/dL    Bilirubin Total 0.2 0.2 - 1.3 mg/dL    Albumin 2.0 (L) 3.4 - 5.0 g/dL    Protein Total 5.7 (L) 6.8 - 8.8 g/dL    Alkaline Phosphatase 138 40 - 150 U/L    ALT 52 (H) 0 - 50 U/L    AST 34 0 - 45 U/L   Lipase   Result Value Ref Range    Lipase 53 (L) 73 - 393 U/L   UA with Microscopic   Result Value Ref Range    Color Urine Straw     Appearance Urine Clear     Glucose Urine Negative NEG^Negative mg/dL    Bilirubin Urine Negative NEG^Negative    Ketones Urine Negative NEG^Negative mg/dL    Specific Gravity Urine 1.003 1.003 - 1.035    Blood Urine Large (A) NEG^Negative    pH Urine 7.5 (H) 5.0 - 7.0 pH    Protein Albumin Urine Negative NEG^Negative mg/dL    Urobilinogen mg/dL Normal 0.0 - 2.0 mg/dL    Nitrite Urine Negative NEG^Negative    Leukocyte Esterase Urine Moderate (A) NEG^Negative    Source Midstream Urine     WBC Urine 19 (H) 0 - 5 /HPF    RBC Urine 3 (H) 0 - 2 /HPF    Squamous Epithelial /HPF Urine 5 (H) 0 - 1 /HPF    Transitional Epi <1 0 - 1 /HPF            Assessments & Plan (with Medical Decision Making)   Rona Krishnamurthy is a 37 year old female  who is status post  section 5 days ago.  Her pregnancy was complicated by gestational hypertension and diabetes.  She presents today with hypertension of 180s/70-100s, mild headache, nausea, stable bilateral lower extremity edema, with generalized  mild crampy abdominal pain with concern. Her blood pressure on arrival to the ED was 185/75 and was 177/94 on repeat. On exam she is nontoxic appearing nor confused, otherwise notable for mild tenderness to palpation of her abdomen but no focal tenderness, hyperreflexia, and bilateral pitting lower extremity edema.  Workup in the ED included CBC with differential, CMP, lipase, and UA.  Her CBC was remarkable for hemoglobin of 8.1 and hematocrit 24.5.  Her CMP is remarkable for mildly elevated ALT of 52.  AST and platelets were normal.  Urinalysis showed no protein, but did have 19 WBCs, moderate leukocyte esterase and large blood.  Spoke with the OB/GYN who recommended a dose of labetalol 20 mg IV and magnesium sulfate 4 grams over 30 minutes and admission for further work up and monitoring. Her pressure remained high (181/91) after the first dose of labetalol and she was given a second dose Per OB recs. Her pressure was 153/78  after the second dose of labetalol.      She was admitted to OB/GYN service under the care of Dr. Maynard.        I have reviewed the nursing notes.    I have reviewed the findings, diagnosis, plan and need for follow up with the patient.    New Prescriptions    No medications on file       Final diagnoses:   Postpartum hypertension       8/3/2018   [facility], [place], EMERGENCY DEPARTMENT    The patient was seen and plan was discussed with the attending physician.     Génesis Tavera MD  PGY-1 Resident    ----------------------------------------------------------------------------------------------------------------------  ATTENDING NOTE: The patient was seen with Dr. Tavera, resident. I was personally and directly involved in patient care including obtaining the history, performing the physical exam, ordering and reviewing of laboratory tests, and decision-making process. Plan of care was discussed with the patient. Rona Krisnhamurthy is a 37 year old female who is 5 days status post emergent   here with high blood pressures, nausea, decreased appetite and not feeling well.   /75  Pulse 70  Temp 98  F (36.7  C) (Oral)  Resp 18  LMP 10/24/2017  SpO2 97%   General: A and O x 3  CV: RRR w m,g,r  Lungs:  CTA B  Abdomen: Soft nontender well-healing  scar is without erythema.  Slightly hyperreflexic  Emergency Department course:  The patient was seen and examined at 1100 am.  Laboratory studies are notable for mild anemia, with a hemoglobin of 8.1.  Sodium is slightly high at 145.  ALT is mildly elevated at 52.  CMP otherwise shows a low calcium, albumin, and total protein as well as a low BUN of 5.  UA is contaminated with squamous epithelial cells.  The patient's initial blood pressure was 185/75, with repeat of 177/94.   Patient is a 38-year-old female who is 5 days status post emergent  here with postpartum hypertension.  She was treated with labetalol as well as 4 g of magnesium sulfate IV for postpartum hypertension.  She will be admitted to the OB GYN service under the care of Dr. Maynard for further management.  Lidia Stock MD  This note was created in part by the use of Dragon voice recognition dictation system. Inadvertent grammatical errors and typographical errors may still exist.  MD Dayami Sal Alda L, MD  [phone]

## 2018-08-04 LAB
ALT SERPL W P-5'-P-CCNC: 46 U/L (ref 0–50)
AST SERPL W P-5'-P-CCNC: 24 U/L (ref 0–45)
CREAT SERPL-MCNC: 0.69 MG/DL (ref 0.52–1.04)
GFR SERPL CREATININE-BSD FRML MDRD: >90 ML/MIN/1.7M2
HGB BLD-MCNC: 8.5 G/DL (ref 11.7–15.7)
MAGNESIUM SERPL-MCNC: 5.6 MG/DL (ref 1.6–2.3)
PLATELET # BLD AUTO: 279 10E9/L (ref 150–450)

## 2018-08-04 PROCEDURE — 85049 AUTOMATED PLATELET COUNT: CPT | Performed by: STUDENT IN AN ORGANIZED HEALTH CARE EDUCATION/TRAINING PROGRAM

## 2018-08-04 PROCEDURE — 82565 ASSAY OF CREATININE: CPT | Performed by: STUDENT IN AN ORGANIZED HEALTH CARE EDUCATION/TRAINING PROGRAM

## 2018-08-04 PROCEDURE — 84450 TRANSFERASE (AST) (SGOT): CPT | Performed by: STUDENT IN AN ORGANIZED HEALTH CARE EDUCATION/TRAINING PROGRAM

## 2018-08-04 PROCEDURE — 85018 HEMOGLOBIN: CPT | Performed by: STUDENT IN AN ORGANIZED HEALTH CARE EDUCATION/TRAINING PROGRAM

## 2018-08-04 PROCEDURE — 12000028 ZZH R&B OB UMMC

## 2018-08-04 PROCEDURE — 84460 ALANINE AMINO (ALT) (SGPT): CPT | Performed by: STUDENT IN AN ORGANIZED HEALTH CARE EDUCATION/TRAINING PROGRAM

## 2018-08-04 PROCEDURE — 83735 ASSAY OF MAGNESIUM: CPT | Performed by: STUDENT IN AN ORGANIZED HEALTH CARE EDUCATION/TRAINING PROGRAM

## 2018-08-04 PROCEDURE — 25000132 ZZH RX MED GY IP 250 OP 250 PS 637: Performed by: STUDENT IN AN ORGANIZED HEALTH CARE EDUCATION/TRAINING PROGRAM

## 2018-08-04 PROCEDURE — 36415 COLL VENOUS BLD VENIPUNCTURE: CPT | Performed by: STUDENT IN AN ORGANIZED HEALTH CARE EDUCATION/TRAINING PROGRAM

## 2018-08-04 PROCEDURE — 25000128 H RX IP 250 OP 636: Performed by: STUDENT IN AN ORGANIZED HEALTH CARE EDUCATION/TRAINING PROGRAM

## 2018-08-04 RX ORDER — NIFEDIPINE 30 MG
30 TABLET, EXTENDED RELEASE ORAL DAILY
Qty: 30 TABLET | Refills: 1 | Status: SHIPPED | OUTPATIENT
Start: 2018-08-05 | End: 2018-09-18

## 2018-08-04 RX ADMIN — OXYCODONE HYDROCHLORIDE 5 MG: 5 TABLET ORAL at 23:32

## 2018-08-04 RX ADMIN — OXYCODONE HYDROCHLORIDE 5 MG: 5 TABLET ORAL at 20:13

## 2018-08-04 RX ADMIN — MAGNESIUM SULFATE IN WATER 2 G/HR: 40 INJECTION, SOLUTION INTRAVENOUS at 04:06

## 2018-08-04 RX ADMIN — SIMETHICONE CHEW TAB 80 MG 80 MG: 80 TABLET ORAL at 23:31

## 2018-08-04 RX ADMIN — SENNOSIDES AND DOCUSATE SODIUM 1 TABLET: 8.6; 5 TABLET ORAL at 09:07

## 2018-08-04 RX ADMIN — SENNOSIDES AND DOCUSATE SODIUM 1 TABLET: 8.6; 5 TABLET ORAL at 20:13

## 2018-08-04 RX ADMIN — NIFEDIPINE 30 MG: 30 TABLET, FILM COATED, EXTENDED RELEASE ORAL at 18:03

## 2018-08-04 RX ADMIN — ACETAMINOPHEN 650 MG: 325 TABLET, FILM COATED ORAL at 09:07

## 2018-08-04 RX ADMIN — SODIUM CHLORIDE, POTASSIUM CHLORIDE, SODIUM LACTATE AND CALCIUM CHLORIDE 75 ML/HR: 600; 310; 30; 20 INJECTION, SOLUTION INTRAVENOUS at 06:53

## 2018-08-04 RX ADMIN — ACETAMINOPHEN 650 MG: 325 TABLET, FILM COATED ORAL at 14:19

## 2018-08-04 RX ADMIN — OXYCODONE HYDROCHLORIDE 5 MG: 5 TABLET ORAL at 09:07

## 2018-08-04 RX ADMIN — SIMETHICONE CHEW TAB 80 MG 80 MG: 80 TABLET ORAL at 18:03

## 2018-08-04 RX ADMIN — ACETAMINOPHEN 650 MG: 325 TABLET, FILM COATED ORAL at 04:08

## 2018-08-04 RX ADMIN — OXYCODONE HYDROCHLORIDE 5 MG: 5 TABLET ORAL at 01:10

## 2018-08-04 RX ADMIN — ACETAMINOPHEN 650 MG: 325 TABLET, FILM COATED ORAL at 22:29

## 2018-08-04 ASSESSMENT — ACTIVITIES OF DAILY LIVING (ADL)
FALL_HISTORY_WITHIN_LAST_SIX_MONTHS: NO
COGNITION: 0 - NO COGNITION ISSUES REPORTED
TOILETING: 0-->INDEPENDENT
DRESS: 0-->INDEPENDENT
RETIRED_COMMUNICATION: 0-->UNDERSTANDS/COMMUNICATES WITHOUT DIFFICULTY
TRANSFERRING: 0-->INDEPENDENT
SWALLOWING: 0-->SWALLOWS FOODS/LIQUIDS WITHOUT DIFFICULTY
BATHING: 0-->INDEPENDENT
RETIRED_EATING: 0-->INDEPENDENT
AMBULATION: 0-->INDEPENDENT

## 2018-08-04 NOTE — PLAN OF CARE
Problem: Patient Care Overview  Goal: Plan of Care/Patient Progress Review  Outcome: Improving  Vitals stable. PP assessment wnl. Pain well managed with oral meds. IV magnesium discontinued at 2pm. Patient denies any symptoms of SOB, chest/epigastric pain, visual disturbances or HA. reflexes are brisk with no clonus noted. Pt is otherwise doing well. Eating a regular diet, passing gas. No BM this shift. Has taken am stool softner. Continue with plan of care.

## 2018-08-04 NOTE — PROGRESS NOTES
Magnesium Progress Note - delayed entry due to patient care.     POD#6 from [facility] for arrest of descent, admitted for PreE w SF.     Subjective: reports overall doing well, headache is gone. Tolerating mag well overall. Denies vision changes, RUQ pain, SOB, chest pain, N/V. Bleeding scant. She is voiding with no problems. Ambulating. Has been to NICU to visit baby. Breastfeeding and pumping.     Objective:  Patient Vitals for the past 24 hrs:   BP Temp Temp src Pulse Heart Rate Resp SpO2 Weight   08/04/18 0900 (P) 144/87 (P) 98.2  F (36.8  C) (P) Oral (P) 74 - (P) 16 (P) 98 % -   08/04/18 0657 - - - - - - - 81.6 kg (180 lb)   08/04/18 0420 120/72 97.9  F (36.6  C) Oral 70 - 16 95 % -   08/04/18 0005 123/69 98  F (36.7  C) Oral 71 - 16 97 % -   08/03/18 2022 130/66 98.2  F (36.8  C) Oral 73 - 18 98 % -   08/03/18 1808 155/80 - - 67 - 16 98 % -   08/03/18 1752 166/83 - - 72 - 16 98 % -   08/03/18 1731 162/83 - - 68 - 16 96 % -   08/03/18 1645 140/75 98.5  F (36.9  C) Oral 79 - 18 96 % -   08/03/18 1630 149/77 - - - 64 18 99 % -   08/03/18 1600 150/86 - - - 62 18 98 % -   08/03/18 1545 164/79 - - - 66 20 96 % -   08/03/18 1522 155/79 - - - - - - -   08/03/18 1515 153/78 - - - 72 20 99 % -   08/03/18 1447 170/88 - - - - - - -   08/03/18 1440 (!) 181/91 98  F (36.7  C) Oral - 60 18 99 % -   08/03/18 1348 (!) 177/94 98  F (36.7  C) Oral - 61 18 100 % -   08/03/18 1005 180/75 98  F (36.7  C) Oral 70 - 18 97 % -       UOP: 400cc/hr    General:  AAOx3, NAD, appears generally well  CV:  RRR, no murmurs, rubs  Resp:  CTAB, no wheezes, rales  Abd:  Soft, nontender, nondistended, fundus firm at approximately 2 cm below umbilicus  Inc: c/d/i with steris  Ext:  2+ symmetric edema in bilateral LE, patellar reflexes 3+, 1 beat clonus    Preeclampsia labs    Recent Labs  Lab 08/04/18  0624 08/03/18  1854 08/03/18  1247   CR 0.69 0.78 0.62    260 245   AST 24 33 34   ALT 46 49 52*       Recent Labs  Lab 08/04/18  0624   MAG  5.6*           Assessment and Plan:  POD#6 from [facility] for arrest of descent, admitted for PreE w SF.     # PreE with SF:   - based on BPs, platelets and LFTs  - BPs have been MR since 1800 on 8/3, Procardia XR started 8/3 around that time. S/p labetalol on arrival   - Labs as above, will order repeat labs in am and get Mag level  - Mag started at 1432 8/3, (4g > 2g),    - no s/s mag toxicity   - UOP adequate   - will dc after 24 hours   - continue Q4H mag checks    # POD6 - recovering appropriately.   - PO tylenol/oxy PRN    Routine pp:    -Rh positive; no rhogam needed  -Rubella immune; no MMR needed  -breast feeding (pumping and visiting NICU)  - undecided for birth control      Aislinn Santoyo, PGY3  8/4/2018      Appreciate Dr. Santoyo's note above, patient also seen and examined by me. I agree with the note above.  BPs remain in normal to mild range.  Could consider discharge if stable after completion of magnesium.  Ce Porras MD

## 2018-08-04 NOTE — PLAN OF CARE
"Problem: Hypertensive Disorders in Pregnancy (Adult,Obstetrics,Pediatric)  Goal: Signs and Symptoms of Listed Potential Problems Will be Absent, Minimized or Managed (Hypertensive Disorders in Pregnancy)  Signs and symptoms of listed potential problems will be absent, minimized or managed by discharge/transition of care (reference Hypertensive Disorders in Pregnancy (Adult,Obstetrics,Pediatric) CPG).   Outcome: Improving  Data: Vital signs within normal limits, except hypertension although last BP was WDL. Magnesium sulfate running at 2g/hr with Lactated Ringers running at 75mL/hr. Patient denies shortness of breath, vision changes, epigastric pain, and/or chest pain. Patient reports that she headache \"on and off\" today but the headache is resolving. Patient states, \"I can't believe how much better I feel.\" Patient eating and drinking normally. Patient able to empty bladder independently, strict intake and output. Patient breast pumping for baby in NICU.  Action: Pain has been adequately managed with Tylenol and Oxycodone. Scheduled Procardia dose given at 1807 this evening.   Response: Spouse visited earlier this evening, returned home.    Plan: Continue with the plan of care.       "

## 2018-08-04 NOTE — PROGRESS NOTES
Magnesium Progress Note - delayed entry due to patient care.     POD#6 from PLTCS for arrest of descent, admitted for PreE w SF.     Subjective: reports overall doing well. Reports a little bit of dizziness, which is new Tolerating mag well overall. Denies vision changes, RUQ pain, SOB, chest pain, N/V. Bleeding scant. She is voiding with no problems. Ambulating.Frequently in  NICU to visit baby. Breastfeeding and pumping.     Objective:  Patient Vitals for the past 24 hrs:   BP Temp Temp src Pulse Heart Rate Resp SpO2 Weight   08/04/18 1206 147/79 97.7  F (36.5  C) Oral 72 - 16 - -   08/04/18 0900 144/87 98.2  F (36.8  C) Oral 74 - 16 98 % -   08/04/18 0657 - - - - - - - 81.6 kg (180 lb)   08/04/18 0420 120/72 97.9  F (36.6  C) Oral 70 - 16 95 % -   08/04/18 0005 123/69 98  F (36.7  C) Oral 71 - 16 97 % -   08/03/18 2022 130/66 98.2  F (36.8  C) Oral 73 - 18 98 % -   08/03/18 1808 155/80 - - 67 - 16 98 % -   08/03/18 1752 166/83 - - 72 - 16 98 % -   08/03/18 1731 162/83 - - 68 - 16 96 % -   08/03/18 1645 140/75 98.5  F (36.9  C) Oral 79 - 18 96 % -   08/03/18 1630 149/77 - - - 64 18 99 % -   08/03/18 1600 150/86 - - - 62 18 98 % -   08/03/18 1545 164/79 - - - 66 20 96 % -   08/03/18 1522 155/79 - - - - - - -   08/03/18 1515 153/78 - - - 72 20 99 % -   08/03/18 1447 170/88 - - - - - - -   08/03/18 1440 (!) 181/91 98  F (36.7  C) Oral - 60 18 99 % -   08/03/18 1348 (!) 177/94 98  F (36.7  C) Oral - 61 18 100 % -       UOP: 342cc/hr + unmeasured in NICU    General:  AAOx3, NAD, appears generally well  CV:  RRR, no murmurs, rubs  Resp:  CTAB, no wheezes, rales  Abd:  Soft, nontender, nondistended, fundus firm at approximately 2 cm below umbilicus  Inc: c/d/i with steris  Ext:  2+ symmetric edema in bilateral LE, patellar reflexes 3+, 1 beat clonus, 2+ biceps reflexes    Preeclampsia labs    Recent Labs  Lab 08/04/18  0624 08/03/18  1854 08/03/18  1247   CR 0.69 0.78 0.62    260 245   AST 24 33 34   ALT 46 49 52*        Recent Labs  Lab 08/04/18  0624   MAG 5.6*           Assessment and Plan:  POD#6 from PLTCS for arrest of descent, admitted for PreE w SF.     # PreE with SF:   - based on BPs, platelets and LFTs  - BPs have been MR since 1800 on 8/3, Procardia XR started 8/3 around that time. S/p labetalol on arrival   - Labs as above, will order repeat labs in am and get Mag level  - Mag started at 1432 8/3, (4g > 2g),    - no s/s mag toxicity   - UOP adequate   - will dc after 24 hours   - continue Q4H mag checks     Rona Cardenas MD  PGY-1  Ob/Gyn

## 2018-08-04 NOTE — DISCHARGE SUMMARY
[facility] Discharge Summary    Rona Krishnamurthy MRN# [id number]   Age: 37 year old YOB: 1980     Date of Admission:  8/3/2018  Date of Discharge:  2018   Admitting Physician:  Crista Maynard MD  Discharge Physician:  Ce Porras MD     Admission Diagnosis:  - PPD#5 from PLTCS for arrest of descent  - Postpartum preeclampsia with severe features  -     Discharge Diagnosis:  - Same as above    Procedures:   - IV magnesium prophylaxis x 24 hours    Consultations:  None    Medications prior to admission:  Prescriptions Prior to Admission   Medication Sig Dispense Refill Last Dose     acetaminophen (TYLENOL) 325 MG tablet Take 2 tablets (650 mg) by mouth every 4 hours as needed for other (multimodal surgical pain management along with NSAIDS and opioid medication as indicated based on pain control and physical function.) 40 tablet 0 8/3/2018 at Unknown time     ferrous sulfate (IRON) 325 (65 Fe) MG tablet Take 1 tablet (325 mg) by mouth daily (with breakfast) 45 tablet 2 2018 at Unknown time     ibuprofen (ADVIL/MOTRIN) 600 MG tablet Take 1 tablet (600 mg) by mouth every 6 hours as needed for other (carmping) 30 tablet 0 8/3/2018 at Unknown time     oxyCODONE IR (ROXICODONE) 5 MG tablet Take 1-2 tablets (5-10 mg) by mouth every 3 hours as needed for other (pain control or improvement in physical function. Hold dose for analgesic side effects.) 10 tablet 0 8/3/2018 at Unknown time     Prenatal Vit-Fe Fumarate-FA (PRENATAL MULTIVITAMIN PLUS IRON) 27-0.8 MG TABS per tablet Take 1 tablet by mouth daily   2018 at Unknown time     senna-docusate (SENOKOT-S;PERICOLACE) 8.6-50 MG per tablet Take 1-2 tablets by mouth 2 times daily as needed for constipation 40 tablet 1 Past Week at Unknown time     acetone, Urine, test STRP Test first voiding of the day. 50 each 11 Past Week at Unknown time     blood glucose monitoring (NO BRAND SPECIFIED) test strip One  Touch Verio test strips.  Test 6 times daily. 200 strip 11 7/27/2018 at Unknown time     Misc. Devices (BREAST PUMP) MISC 1 each as needed 1 each 0 Unknown at Unknown time     Misc. Devices (BREAST PUMP) MISC 1 each as needed 1 each 0 Unknown at Unknown time         Brief History of Presentation:    Ms Krishnamurthy is PPD#5 PLTCS for arrest of descent and cat II FHT. Her pregnancy was complicated by gHTN and GDMA1. Her surgery was complicated by a left sided cervical extension resulting in an EBL of 1120 mL. Her infant is currently in the NICU for blood glucose control.     On the day prior to admision she became increasingly uncomfortable with constipation and remained without relief after an enema. She had also been routinely checking her BPs at home with a home monitor and when her blood pressures returned in the 180s/90s, she became concerned and returned for further evaluation.    Hospital Course:    On admission, she was given a loading dose of Magnesium and continued on magnesium for 24 hours. She was also given IV labetalol twice in the ED and started on 30mg nifedipine XL. She was continued on this regimen and her blood pressures remained reassuring.    On HD#3, her blood pressures were normotensive to mild range. She also continued to meet all of her other postpartum goals and was deemed stable for discharge.  She was voiding without difficulty, tolerating a regular diet without nausea and vomiting, her pain was well controlled on oral pain medicines and her lochia was appropriate.     Discharge Instructions:  Call or present to labor and delivery if you experience:   - Headache, vision changes, upper abdominal pain, significant increase in swelling, generalized unwell feeling   - Elevated blood pressures with SBP >160 and/or DBP >110    Follow up:  Follow up in OB/GYN clinic this week for repeat BP check    Discharge Medications:  Current Discharge Medication List      START taking these medications    Details    NIFEdipine ER osmotic (ADALAT CC) 30 MG TB24 Take 1 tablet (30 mg) by mouth daily  Qty: 30 tablet, Refills: 1    Associated Diagnoses: Postpartum hypertension         CONTINUE these medications which have NOT CHANGED    Details   acetaminophen (TYLENOL) 325 MG tablet Take 2 tablets (650 mg) by mouth every 4 hours as needed for other (multimodal surgical pain management along with NSAIDS and opioid medication as indicated based on pain control and physical function.)  Qty: 40 tablet, Refills: 0    Associated Diagnoses: S/P  section      ferrous sulfate (IRON) 325 (65 Fe) MG tablet Take 1 tablet (325 mg) by mouth daily (with breakfast)  Qty: 45 tablet, Refills: 2    Associated Diagnoses: Anemia due to blood loss, acute      ibuprofen (ADVIL/MOTRIN) 600 MG tablet Take 1 tablet (600 mg) by mouth every 6 hours as needed for other (carmping)  Qty: 30 tablet, Refills: 0    Associated Diagnoses: S/P  section      oxyCODONE IR (ROXICODONE) 5 MG tablet Take 1-2 tablets (5-10 mg) by mouth every 3 hours as needed for other (pain control or improvement in physical function. Hold dose for analgesic side effects.)  Qty: 10 tablet, Refills: 0    Associated Diagnoses: S/P  section      Prenatal Vit-Fe Fumarate-FA (PRENATAL MULTIVITAMIN PLUS IRON) 27-0.8 MG TABS per tablet Take 1 tablet by mouth daily      senna-docusate (SENOKOT-S;PERICOLACE) 8.6-50 MG per tablet Take 1-2 tablets by mouth 2 times daily as needed for constipation  Qty: 40 tablet, Refills: 1    Associated Diagnoses: S/P  section      acetone, Urine, test STRP Test first voiding of the day.  Qty: 50 each, Refills: 11    Associated Diagnoses: Gestational diabetes      blood glucose monitoring (NO BRAND SPECIFIED) test strip One Touch Verio test strips.  Test 6 times daily.  Qty: 200 strip, Refills: 11    Associated Diagnoses: Gestational diabetes      !! Misc. Devices (BREAST PUMP) MISC 1 each as needed  Qty: 1 each, Refills: 0     Associated Diagnoses: Postpartum care and examination of lactating mother      !! Misc. Devices (BREAST PUMP) MISC 1 each as needed  Qty: 1 each, Refills: 0    Associated Diagnoses: High-risk pregnancy, elderly primigravida       !! - Potential duplicate medications found. Please discuss with provider.          Nora Wilder MD  OB/GYN Resident, PGY-4  8/5/2018 6:44 AM        Appreciate Dr. Wilder's summary above, patient also seen and examined by me on the day of discharge. I agree with the summary above.   Ce Porras MD

## 2018-08-04 NOTE — PLAN OF CARE
Problem: Patient Care Overview  Goal: Plan of Care/Patient Progress Review  Outcome: Improving  Vital signs stable, BP within parameters throughout shift. Brisk reflexes noted, no clonus present. Slight headache at start of shift, resolved after several hours. Denies visual changes, epigastric pain, shortness of breath. PRN medications given for incision pain with improvement noted. Up ad ember, voiding well. Tolerating 2g/hour Magnesium infusion. Pumping every 3-4 hours throughout the night. Continue to monitor blood pressures and tolerance of magnesium infusion.

## 2018-08-04 NOTE — PROGRESS NOTES
Magnesium Progress Note - delayed entry due to patient care.     POD#6 from PLTCS for arrest of descent, admitted for PreE w SF.     Subjective: reports overall doing well, resting. Tolerating mag well overall. Mild HA  Denies vision changes, RUQ pain, SOB, chest pain, N/V.    Objective:  Patient Vitals for the past 24 hrs:   BP Temp Temp src Pulse Heart Rate Resp SpO2   08/04/18 0005 123/69 98  F (36.7  C) Oral 71 - 16 97 %   08/03/18 2022 130/66 98.2  F (36.8  C) Oral 73 - 18 98 %   08/03/18 1808 155/80 - - 67 - 16 98 %   08/03/18 1752 166/83 - - 72 - 16 98 %   08/03/18 1731 162/83 - - 68 - 16 96 %   08/03/18 1645 140/75 98.5  F (36.9  C) Oral 79 - 18 96 %   08/03/18 1630 149/77 - - - 64 18 99 %   08/03/18 1600 150/86 - - - 62 18 98 %   08/03/18 1545 164/79 - - - 66 20 96 %   08/03/18 1522 155/79 - - - - - -   08/03/18 1515 153/78 - - - 72 20 99 %   08/03/18 1447 170/88 - - - - - -   08/03/18 1440 (!) 181/91 98  F (36.7  C) Oral - 60 18 99 %   08/03/18 1348 (!) 177/94 98  F (36.7  C) Oral - 61 18 100 %   08/03/18 1005 180/75 98  F (36.7  C) Oral 70 - 18 97 %       UOP: 300cc/hr over past 8 hrs    General:  AAOx3, NAD, appears generally well  CV:  RRR, no murmurs, rubs  Resp:  CTAB, no wheezes, rales  Abd:  Soft, nontender, nondistended, fundus firm at approximately 2 cm below umbilicus  Inc: c/d/i with steris  Ext:  2+ symmetric edema in bilateral LE    Preeclampsia labs    Recent Labs  Lab 08/03/18  1854 08/03/18  1247 07/29/18  1621   CR 0.78 0.62  --     245 170   AST 33 34  --    ALT 49 52*  --      No lab results found in last 7 days.        Assessment and Plan:  POD#6 from PLTCS for arrest of descent, admitted for PreE w SF.     # PreE with SF:   - based on BPs, platelets and LFTs  - BPs have been MR since 1800 on 8/3, Procardia XR started 8/3 around that time. S/p labetalol on arrival   - Labs as above, will order repeat labs in am and get Mag level  - Mag started at 1432 8/3, (4g > 2g),    - no s/s  mag toxicity   - UOP adequate   - will dc after 24 hours   - continue Q4H mag checks    # POD6 - recovering appropriately.   - PO tylenol/oxy PRN    Routine pp:    -Rh positive; no rhogam needed  -Rubella immune; no MMR needed  -breast feeding (pumping and visiting NICU)  - undecided for birth control      Aislinn Santoyo, PGY2  1:33 AM, 8/4/2018

## 2018-08-04 NOTE — PLAN OF CARE
Problem: Hypertensive Disorders in Pregnancy (Adult,Obstetrics,Pediatric)  Goal: Signs and Symptoms of Listed Potential Problems Will be Absent, Minimized or Managed (Hypertensive Disorders in Pregnancy)  Signs and symptoms of listed potential problems will be absent, minimized or managed by discharge/transition of care (reference Hypertensive Disorders in Pregnancy (Adult,Obstetrics,Pediatric) CPG).  Outcome: No Change  Patient arrived to CC unit via zoom cart at 1645,with belongings, accompanied by patient transport . Infant is in the NICU. Received report from CALLIE Martins (ED) over the phone at 1640. Unit and room orientation started. Call light given; no concerns present at this time. Continue with plan of care.

## 2018-08-05 VITALS
BODY MASS INDEX: 32.22 KG/M2 | RESPIRATION RATE: 18 BRPM | WEIGHT: 176.2 LBS | HEART RATE: 78 BPM | TEMPERATURE: 98.4 F | OXYGEN SATURATION: 98 % | SYSTOLIC BLOOD PRESSURE: 134 MMHG | DIASTOLIC BLOOD PRESSURE: 80 MMHG

## 2018-08-05 PROCEDURE — 25000132 ZZH RX MED GY IP 250 OP 250 PS 637: Performed by: STUDENT IN AN ORGANIZED HEALTH CARE EDUCATION/TRAINING PROGRAM

## 2018-08-05 RX ADMIN — SIMETHICONE CHEW TAB 80 MG 80 MG: 80 TABLET ORAL at 08:33

## 2018-08-05 RX ADMIN — ACETAMINOPHEN 650 MG: 325 TABLET, FILM COATED ORAL at 08:33

## 2018-08-05 RX ADMIN — SENNOSIDES AND DOCUSATE SODIUM 1 TABLET: 8.6; 5 TABLET ORAL at 08:33

## 2018-08-05 NOTE — PLAN OF CARE
Problem: Patient Care Overview  Goal: Plan of Care/Patient Progress Review  Outcome: Improving  BP's within parameters overnight, other vital signs stable. Reflexes brisk, no clonus.  Denies HA, RUQ, or changes in vision.   Incision clean, dry, intact and open to air/with steri strips in place. Pain controlled with Tylenol and Oxycodone. Patient ambulating up ad ember.  Patient passing gas and had last bowel movement 8/3, taking Senna and PRN Simethicone. Pumping for baby in NICU and visiting often.  Will continue with current plan of care.

## 2018-08-05 NOTE — PROGRESS NOTES
Post Partum Progress Note    Subjective:  She is resting comfortably in bed this morning.  Complains of needing to pass more gas but unable to. Pain is improving and well controlled on current medication regimen. Tolerating PO intake.  Lochia present and minimal.  Voiding without difficulty.  Passing flatus. Ambulating without dizziness or difficulty.  She denies headache, changes in vision, nausea/vomiting, chest pain, shortness of breath, RUQ pain, or worsening edema.    Objective:  Vitals:    18 0010 18 0442 18 0448   BP: 141/80 127/70  136/84   Pulse: 82 80  82   Resp:    Temp: 98.4  F (36.9  C) 98.2  F (36.8  C)  98  F (36.7  C)   TempSrc: Oral Oral  Oral   SpO2:       Weight:   79.9 kg (176 lb 3.2 oz)        General: NAD. A&Ox3.  CV: Regular rate and rhythm  Pulm: Normal respiratory effort.  Abd: Soft, non-tender, non-distended. Fundus is firm and below the umbilicus.  Incision c/d/i.   Ext: trace edema. No calf tenderness.    # Pain Assessment:  Current Pain Score 2018   Patient currently in pain? denies   Pain location -   Pain descriptors -   - Rona is experiencing pain due to recent delivery. Pain management was discussed and the plan was created in a collaborative fashion.  Rona's response to the current recommendations: engaged  - Opioid regimen: roxicodone  - Response to opioid medications: Reduction of symptoms  - Bowel regimen: senna-s  - Pharmacologic adjuvants: Acetaminophen  - Non-pharmacologic adjuvants: Heat      Assessment/Plan:  Rona Krishnamurthy is a 37 year old  female who is POD#7 s/p PLTCS d/t arrest of descent. Her postpartum course has been complicated by new diagnosis of pre-eclampsia with severe features for which she was re-admitted and received 24 hours of magnesium.    Preeclampsia with severe features   - No signs or symptoms of worsening pre-eclampsia.   - BP:     - Currently with normotensive to mild range. Continue to  monitor closely    - Alert with sustained SBP >160 and/or DBP >110 for acute treatment    - S/p 24 hours of magnesium    - Current oral regimen: Procardia 30 mg XL   - UOP: >100 cc/hr. Weight 81.6kg>79.9 kg. Continue strict I&Os and daily weights.   - HELLP labs within normal limits    Postpartum:     - Routine postpartum care    Discharge to home today    Nora Wilder MD  OB/GYN Resident, PGY-4  8/5/2018 6:40 AM      Appreciate Dr. Wilder's note above, patient also seen and examined by me. I agree with the note above.   Ce Porras MD

## 2018-08-05 NOTE — PLAN OF CARE
Problem: Patient Care Overview  Goal: Plan of Care/Patient Progress Review  Outcome: Adequate for Discharge Date Met: 08/05/18  Pt discharged to home. Instructions & prescriptions given/reviewed. Pt had no further questions & verbalized understanding her plan. Encouraged to F/U at clinic.

## 2018-08-13 ENCOUNTER — OFFICE VISIT (OUTPATIENT)
Dept: OBGYN | Facility: CLINIC | Age: 38
End: 2018-08-13
Attending: OBSTETRICS & GYNECOLOGY
Payer: COMMERCIAL

## 2018-08-13 VITALS
DIASTOLIC BLOOD PRESSURE: 86 MMHG | SYSTOLIC BLOOD PRESSURE: 135 MMHG | BODY MASS INDEX: 30.17 KG/M2 | WEIGHT: 165 LBS | HEART RATE: 87 BPM

## 2018-08-13 DIAGNOSIS — O13.3 PREGNANCY-INDUCED HYPERTENSION IN THIRD TRIMESTER: Primary | ICD-10-CM

## 2018-08-13 PROCEDURE — G0463 HOSPITAL OUTPT CLINIC VISIT: HCPCS | Mod: ZF

## 2018-08-13 NOTE — MR AVS SNAPSHOT
After Visit Summary   8/13/2018    Rona Krishnamurthy    MRN: [id number]           Patient Information     Date Of Birth          1980        Visit Information        Provider Department      8/13/2018 1:45 PM Crista Maynard MD [facility]        Today's Diagnoses     Gestational Hypertension- Per MD, IOL 37-39    -  1    Pre-eclampsia, delivered           Follow-ups after your visit        Follow-up notes from your care team     Return in about 4 weeks (around 9/10/2018) for Routine Visit.      Who to contact     Please call your clinic at [phone] to:    Ask questions about your health    Make or cancel appointments    Discuss your medicines    Learn about your test results    Speak to your doctor            Additional Information About Your Visit        [employer] Information     [employer] gives you secure access to your electronic health record. If you see a primary care provider, you can also send messages to your care team and make appointments. If you have questions, please call your primary care clinic.  If you do not have a primary care provider, please call [phone] and they will assist you.      [employer] is an electronic gateway that provides easy, online access to your medical records. With [employer], you can request a clinic appointment, read your test results, renew a prescription or communicate with your care team.     To access your existing account, please contact your [facility] or call [phone] for assistance.        Care EveryWhere ID     This is your Care EveryWhere ID. This could be used by other organizations to access your [facility] medical records  [id number]        Your Vitals Were     Pulse Last Period Breastfeeding? BMI (Body Mass Index)          87 10/24/2017 No 30.17 kg/m2         Blood Pressure from Last 3 Encounters:   08/13/18 135/86   08/05/18 134/80   08/01/18 115/74    Weight from Last 3 Encounters:    08/13/18 74.8 kg (165 lb)   08/05/18 79.9 kg (176 lb 3.2 oz)   07/17/18 84.1 kg (185 lb 4.8 oz)              Today, you had the following     No orders found for display         Today's Medication Changes          These changes are accurate as of 8/13/18 11:30 PM.  If you have any questions, ask your nurse or doctor.               These medicines have changed or have updated prescriptions.        Dose/Directions    breast pump Misc   This may have changed:  Another medication with the same name was removed. Continue taking this medication, and follow the directions you see here.   Used for:  High-risk pregnancy, elderly primigravida   Changed by:  Crista Maynard MD        Dose:  1 each   1 each as needed   Quantity:  1 each   Refills:  0         Stop taking these medicines if you haven't already. Please contact your care team if you have questions.     oxyCODONE IR 5 MG tablet   Commonly known as:  ROXICODONE   Stopped by:  Crista Maynard MD                    Primary Care Provider Office Phone # Fax #    Dahlia Paula Bradley Noriega [phone]       [address] [place] [address] [address] [place] [address]        Equal Access to Services     JEAN PAUL ACUNA AH: Hadii mak hensleyo Sokaela, waaxda luqadaha, qaybta kaalmada adeegyada, kim bryan. So [facility] [phone].    ATENCIÓN: Si habla español, tiene a trevizo disposición servicios gratuitos de asistencia lingüística. Rachelame al [phone].    We comply with applicable federal civil rights laws and [place] laws. We do not discriminate on the basis of race, color, national origin, age, disability, sex, sexual orientation, or gender identity.            Thank you!     Thank you for choosing [facility]  for your care. Our goal is always to provide you with excellent care. Hearing back from our patients is one way we can continue to improve our services. Please take a few minutes to complete  the written survey that you may receive in the mail after your visit with us. Thank you!             Your Updated Medication List - Protect others around you: Learn how to safely use, store and throw away your medicines at www.disposemymeds.org.          This list is accurate as of 18 11:30 PM.  Always use your most recent med list.                   Brand Name Dispense Instructions for use Diagnosis    acetaminophen 325 MG tablet    TYLENOL    40 tablet    Take 2 tablets (650 mg) by mouth every 4 hours as needed for other (multimodal surgical pain management along with NSAIDS and opioid medication as indicated based on pain control and physical function.)    S/P  section       acetone (Urine) test Strp     50 each    Test first voiding of the day.    Gestational diabetes       blood glucose monitoring test strip    no brand specified    200 strip    One Touch Verio test strips.  Test 6 times daily.    Gestational diabetes       breast pump Misc     1 each    1 each as needed    High-risk pregnancy, elderly primigravida       ferrous sulfate 325 (65 Fe) MG tablet    IRON    45 tablet    Take 1 tablet (325 mg) by mouth daily (with breakfast)    Anemia due to blood loss, acute       ibuprofen 600 MG tablet    ADVIL/MOTRIN    30 tablet    Take 1 tablet (600 mg) by mouth every 6 hours as needed for other (carmping)    S/P  section       NIFEdipine ER 30 MG Tb24    ADALAT CC    30 tablet    Take 1 tablet (30 mg) by mouth daily    Postpartum hypertension       prenatal multivitamin plus iron 27-0.8 MG Tabs per tablet      Take 1 tablet by mouth daily        senna-docusate 8.6-50 MG per tablet    SENOKOT-S;PERICOLACE    40 tablet    Take 1-2 tablets by mouth 2 times daily as needed for constipation    S/P  section

## 2018-08-13 NOTE — LETTER
8/13/2018       RE: Rona Krishnamurthy  [address] [facility] [address]     Dear Colleague,    Thank you for referring your patient, Rona Krishnamurthy, to the [facility] at [facility]. Please see a copy of my visit note below.    Feeling well. No pain.  Headaches have resolved. BPs at home improved with one reading last week 103 diastolic.    Vitals:    08/13/18 1358 08/13/18 1359 08/13/18 1400 08/13/18 1401   BP: 146/87 130/86 128/85 135/86   Pulse: 92 92 87 87   Weight: 74.8 kg (165 lb)      all bps today normal w/o severe ranges    Swelling improving in b/l LE.     A/p: post partum after c/s w/ BP issues PP.     Improving. rec continue nifedipine and f/u routine PP visit at 6 wks.     Advised to call if symptoms of PRe e return.     Again, thank you for allowing me to participate in the care of your patient.      Sincerely,    Crista Maynard MD

## 2018-08-14 NOTE — PROGRESS NOTES
Feeling well. No pain.  Headaches have resolved. BPs at home improved with one reading last week 103 diastolic.    Vitals:    08/13/18 1358 08/13/18 1359 08/13/18 1400 08/13/18 1401   BP: 146/87 130/86 128/85 135/86   Pulse: 92 92 87 87   Weight: 74.8 kg (165 lb)      all bps today normal w/o severe ranges    Swelling improving in b/l LE.     A/p: post partum after c/s w/ BP issues PP.     Improving. rec continue nifedipine and f/u routine PP visit at 6 wks.     Advised to call if symptoms of PRe e return.     Crista Maynard MD, FACOG  [facility]  OB/GYN    8/13/2018  11:29 PM

## 2018-09-12 ASSESSMENT — ANXIETY QUESTIONNAIRES
1. FEELING NERVOUS, ANXIOUS, OR ON EDGE: NOT AT ALL
7. FEELING AFRAID AS IF SOMETHING AWFUL MIGHT HAPPEN: NOT AT ALL
4. TROUBLE RELAXING: NOT AT ALL
GAD7 TOTAL SCORE: 1
GAD7 TOTAL SCORE: 1
3. WORRYING TOO MUCH ABOUT DIFFERENT THINGS: SEVERAL DAYS
5. BEING SO RESTLESS THAT IT IS HARD TO SIT STILL: NOT AT ALL
2. NOT BEING ABLE TO STOP OR CONTROL WORRYING: NOT AT ALL
7. FEELING AFRAID AS IF SOMETHING AWFUL MIGHT HAPPEN: NOT AT ALL
6. BECOMING EASILY ANNOYED OR IRRITABLE: NOT AT ALL

## 2018-09-13 ENCOUNTER — OFFICE VISIT (OUTPATIENT)
Dept: OBGYN | Facility: CLINIC | Age: 38
End: 2018-09-13
Attending: ADVANCED PRACTICE MIDWIFE
Payer: COMMERCIAL

## 2018-09-13 VITALS
SYSTOLIC BLOOD PRESSURE: 146 MMHG | DIASTOLIC BLOOD PRESSURE: 87 MMHG | WEIGHT: 167.9 LBS | HEIGHT: 62 IN | HEART RATE: 76 BPM | BODY MASS INDEX: 30.9 KG/M2

## 2018-09-13 DIAGNOSIS — Z30.09 GENERAL COUNSELING FOR PRESCRIPTION OF ORAL CONTRACEPTIVES: ICD-10-CM

## 2018-09-13 PROCEDURE — G0463 HOSPITAL OUTPT CLINIC VISIT: HCPCS | Mod: ZF

## 2018-09-13 RX ORDER — ACETAMINOPHEN AND CODEINE PHOSPHATE 120; 12 MG/5ML; MG/5ML
0.35 SOLUTION ORAL DAILY
Qty: 84 TABLET | Refills: 4 | Status: SHIPPED | OUTPATIENT
Start: 2018-09-13 | End: 2019-11-11

## 2018-09-13 ASSESSMENT — ANXIETY QUESTIONNAIRES
GAD7 TOTAL SCORE: 1
5. BEING SO RESTLESS THAT IT IS HARD TO SIT STILL: NOT AT ALL
7. FEELING AFRAID AS IF SOMETHING AWFUL MIGHT HAPPEN: NOT AT ALL
GAD7 TOTAL SCORE: 1
6. BECOMING EASILY ANNOYED OR IRRITABLE: NOT AT ALL
3. WORRYING TOO MUCH ABOUT DIFFERENT THINGS: SEVERAL DAYS
1. FEELING NERVOUS, ANXIOUS, OR ON EDGE: NOT AT ALL
2. NOT BEING ABLE TO STOP OR CONTROL WORRYING: NOT AT ALL

## 2018-09-13 ASSESSMENT — PATIENT HEALTH QUESTIONNAIRE - PHQ9: 5. POOR APPETITE OR OVEREATING: NOT AT ALL

## 2018-09-13 ASSESSMENT — PAIN SCALES - GENERAL: PAINLEVEL: NO PAIN (0)
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
## 2018-09-13 NOTE — MR AVS SNAPSHOT
"              After Visit Summary   9/13/2018    Rona Krishnamurthy    MRN: [id number]           Patient Information     Date Of Birth          1980        Visit Information        Provider Department      9/13/2018 11:15 AM Salud Alfaro CNM [facility]        Today's Diagnoses     Postpartum care and examination of lactating mother    -  1    General counseling for prescription of oral contraceptives           Follow-ups after your visit        Follow-up notes from your care team     Return for asap with dr. ramos.      Who to contact     Please call your clinic at [phone] to:    Ask questions about your health    Make or cancel appointments    Discuss your medicines    Learn about your test results    Speak to your doctor            Additional Information About Your Visit        [employer] Information     [employer] gives you secure access to your electronic health record. If you see a primary care provider, you can also send messages to your care team and make appointments. If you have questions, please call your primary care clinic.  If you do not have a primary care provider, please call [phone] and they will assist you.      [employer] is an electronic gateway that provides easy, online access to your medical records. With [employer], you can request a clinic appointment, read your test results, renew a prescription or communicate with your care team.     To access your existing account, please contact your [facility] or call [phone] for assistance.        Care EveryWhere ID     This is your Care EveryWhere ID. This could be used by other organizations to access your [facility] medical records  [id number]        Your Vitals Were     Pulse Height Last Period BMI (Body Mass Index)          76 1.575 m (5' 2\") 10/24/2017 30.71 kg/m2         Blood Pressure from Last 3 Encounters:   09/18/18 123/84   09/13/18 146/87   08/13/18 135/86 "    Weight from Last 3 Encounters:   09/18/18 77.1 kg (170 lb)   09/13/18 76.2 kg (167 lb 14.4 oz)   08/13/18 74.8 kg (165 lb)                 Today's Medication Changes          These changes are accurate as of 9/13/18 11:59 PM.  If you have any questions, ask your nurse or doctor.               Start taking these medicines.        Dose/Directions    norethindrone 0.35 MG per tablet   Commonly known as:  MICRONOR   Used for:  General counseling for prescription of oral contraceptives, Postpartum care and examination of lactating mother   Started by:  Salud Alafro CNM        Dose:  0.35 mg   Take 1 tablet (0.35 mg) by mouth daily   Quantity:  84 tablet   Refills:  4            Where to get your medicines      These medications were sent to [facility]/pharmacy [address] [facility] [address] [address]     Phone:  [phone]     norethindrone 0.35 MG per tablet                Primary Care Provider Office Phone # Fax #    Dahlia Paula Noriega [phone]       [address] [place] [address] [address] [place] [address]        Equal Access to Services     BERT ACUNA AH: Liset hensleyo Sokaela, waaxda luángel, qaybta kaalmada adejayayada, kim bryan. So [facility] [phone].    ATENCIÓN: Si habla español, tiene a trevizo disposición servicios gratuitos de asistencia lingüística. Llame al [phone].    We comply with applicable federal civil rights laws and [place] laws. We do not discriminate on the basis of race, color, national origin, age, disability, sex, sexual orientation, or gender identity.            Thank you!     Thank you for choosing [facility]  for your care. Our goal is always to provide you with excellent care. Hearing back from our patients is one way we can continue to improve our services. Please take a few minutes to complete the written survey that you may receive in the  mail after your visit with us. Thank you!             Your Updated Medication List - Protect others around you: Learn how to safely use, store and throw away your medicines at www.disposemymeds.org.          This list is accurate as of 18 11:59 PM.  Always use your most recent med list.                   Brand Name Dispense Instructions for use Diagnosis    acetone (Urine) test Strp     50 each    Test first voiding of the day.    Gestational diabetes       blood glucose monitoring test strip    no brand specified    200 strip    One Touch Verio test strips.  Test 6 times daily.    Gestational diabetes       breast pump Misc     1 each    1 each as needed    High-risk pregnancy, elderly primigravida       ferrous sulfate 325 (65 Fe) MG tablet    IRON    45 tablet    Take 1 tablet (325 mg) by mouth daily (with breakfast)    Anemia due to blood loss, acute       norethindrone 0.35 MG per tablet    MICRONOR    84 tablet    Take 1 tablet (0.35 mg) by mouth daily    General counseling for prescription of oral contraceptives, Postpartum care and examination of lactating mother       prenatal multivitamin plus iron 27-0.8 MG Tabs per tablet      Take 1 tablet by mouth daily        senna-docusate 8.6-50 MG per tablet    SENOKOT-S;PERICOLACE    40 tablet    Take 1-2 tablets by mouth 2 times daily as needed for constipation    S/P  section
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
## 2018-09-14 ASSESSMENT — PATIENT HEALTH QUESTIONNAIRE - PHQ9: SUM OF ALL RESPONSES TO PHQ QUESTIONS 1-9: 1

## 2018-09-18 ENCOUNTER — OFFICE VISIT (OUTPATIENT)
Dept: INTERNAL MEDICINE | Facility: CLINIC | Age: 38
End: 2018-09-18
Attending: INTERNAL MEDICINE
Payer: COMMERCIAL

## 2018-09-18 VITALS
WEIGHT: 170 LBS | BODY MASS INDEX: 31.09 KG/M2 | DIASTOLIC BLOOD PRESSURE: 84 MMHG | SYSTOLIC BLOOD PRESSURE: 123 MMHG | HEART RATE: 73 BPM

## 2018-09-18 PROCEDURE — G0463 HOSPITAL OUTPT CLINIC VISIT: HCPCS | Mod: ZF

## 2018-09-18 RX ORDER — NIFEDIPINE 30 MG
30 TABLET, EXTENDED RELEASE ORAL DAILY
Qty: 90 TABLET | Refills: 3 | Status: SHIPPED | OUTPATIENT
Start: 2018-09-18 | End: 2020-09-24

## 2018-09-18 ASSESSMENT — ENCOUNTER SYMPTOMS
NERVOUS/ANXIOUS: 0
POLYPHAGIA: 0
FEVER: 0
POLYDIPSIA: 0
BRUISES/BLEEDS EASILY: 0
DIARRHEA: 0
DECREASED CONCENTRATION: 0
NAUSEA: 0
SWOLLEN GLANDS: 0
CHILLS: 0
DEPRESSION: 0
INSOMNIA: 0
COUGH: 0
VOMITING: 0
CONSTIPATION: 0
HEMOPTYSIS: 0
ALTERED TEMPERATURE REGULATION: 0
PANIC: 0
ABDOMINAL PAIN: 0
FATIGUE: 0

## 2018-09-18 NOTE — PROGRESS NOTES
HPI  Patient is here to discuss hypertension. She reports that she was diagnosed with postpartum preeclampsia. She had gestation hypertension during pregnancy. She delivered at 38 weeks, baby was 6 lb 13 oz. She is currently nursing. She also had gestational diabetes.     Review of Systems     Constitutional:  Negative for fever, chills and fatigue.   Respiratory:   Negative for cough and hemoptysis.    Gastrointestinal:  Negative for nausea, vomiting, abdominal pain, diarrhea and constipation.   Skin:  Negative for itching and rash.   Endo/Heme:  Negative for anemia, swollen glands and bruises/bleeds easily.   Psychiatric/Behavioral:  Negative for depression, decreased concentration, mood swings and panic attacks.    Endocrine:  Negative for altered temperature regulation, polyphagia, polydipsia, unwanted hair growth and change in facial hair.    Current Outpatient Prescriptions   Medication     acetone, Urine, test STRP     blood glucose monitoring (NO BRAND SPECIFIED) test strip     Misc. Devices (BREAST PUMP) MISC     NIFEdipine ER osmotic (ADALAT CC) 30 MG TB24     norethindrone (MICRONOR) 0.35 MG per tablet     Prenatal Vit-Fe Fumarate-FA (PRENATAL MULTIVITAMIN PLUS IRON) 27-0.8 MG TABS per tablet     senna-docusate (SENOKOT-S;PERICOLACE) 8.6-50 MG per tablet     No current facility-administered medications for this visit.      Past Medical History:   Diagnosis Date     Atopic dermatitis 2005     Diabetes (H)     gestational, diet controlled     Hypertension     gestational     Past Medical History:   Diagnosis Date     Atopic dermatitis 2005     Diabetes (H)     gestational, diet controlled     Hypertension     gestational     Past Surgical History:   Procedure Laterality Date     BREAST SURGERY  1998    fibroadenoma removal      SECTION N/A 2018    Procedure:  SECTION;;  Surgeon: Demetra Evangelista MD;  [facility]: [facility] TOOTH EXTRACTION W/FORCEP       Family  History   Problem Relation Age of Onset     Breast Cancer Other      Aunt - Dad's sister     Breast Cancer Other      Mom's grandma     Prostate Cancer Father      Hypertension Mother      Preeclampsia Mother      Diabetes Paternal Grandmother      Diabetes Paternal Grandfather      Hypertension Sister      Preeclampsia Sister      Social History     Social History     Marital status:      Spouse name: Tj Krishnamurthy     Number of children: N/A     Years of education: N/A     Occupational History     Not on file.     Social History Main Topics     Smoking status: Never Smoker     Smokeless tobacco: Never Used     Alcohol use No     Drug use: Not on file      Comment: Occasional- before pregnancy     Sexual activity: Yes     Partners: Male     Birth control/ protection: None     Other Topics Concern     Not on file     Social History Narrative    ** Merged History Encounter **         How much exercise per week? 1-2 exercise and daily activities    How much calcium per day? In foods and prenatals       How much caffeine per day? 100 150 mg a day    How much vitamin D per day? Un prenatals    Do you/your family wear seatbelts?  Yes    Do you/your family use safety helmets? Yes    Do you/your family use sunscreen? Yes    Do you/your family keep firearms in the home? No    Do you/your family have a smoke detector(s)? Yes        Reviewed lidna storm 12-               Vitals:    09/18/18 1125 09/18/18 1126 09/18/18 1127   BP: (!) 148/94 144/84 123/84   Pulse: 76 76 73   Weight: 77.1 kg (170 lb)         Physical Exam   Constitutional: She is well-developed, well-nourished, and in no distress.   HENT:   Head: Normocephalic and atraumatic.   Cardiovascular: Normal rate.    Pulmonary/Chest: Effort normal.   Skin: Skin is warm and dry.   Psychiatric: Mood, memory, affect and judgment normal.   Vitals reviewed.      Assessment and plan:    Rona was seen today for recheck.    Diagnoses and all orders for this  visit:    Postpartum hypertension.  Reviewed blood pressure readings with patient.  Her blood pressure is currently within acceptable range.  Recommend to continue with current dose of nifedipine without changes.  Patient was advised on criteria for reducing the dose of anti-hypertensive therapy.  Recommend home blood pressure monitoring and follow-up in 3 months.  Patient was also advised on risk of recurrence of hypertension during subsequent pregnancy.  She is on birth control pill at present time and has no plans for future pregnancy right now.   -     NIFEdipine ER (ADALAT CC) 30 MG TB24; Take 1 tablet (30 mg) by mouth daily    Total time spent 25 minutes.  More than 50% of the time spent with Ms. Krishnamurthy on counseling / coordinating her care    Torri Todd MD

## 2018-09-18 NOTE — MR AVS SNAPSHOT
After Visit Summary   9/18/2018    Rona Krishnamurthy    MRN: [id number]           Patient Information     Date Of Birth          1980        Visit Information        Provider Department      9/18/2018 11:20 AM Torri Todd MD [facility]         Today's Diagnoses     Postpartum hypertension           Follow-ups after your visit        Follow-up notes from your care team     Return in about 1 month (around 10/18/2018).      Who to contact     Please call your clinic at [phone] to:    Ask questions about your health    Make or cancel appointments    Discuss your medicines    Learn about your test results    Speak to your doctor            Additional Information About Your Visit        MyChart Information     [employer] [facility] [employer] gives you secure access to your electronic health record. If you see a primary care provider, you can also send messages to your care team and make appointments. If you have questions, please call your primary care clinic.  If you do not have a primary care provider, please call [phone] and they will assist you.      [facility] [employer] is an electronic gateway that provides easy, online access to your medical records. With [facility] [employer], you can request a clinic appointment, read your test results, renew a prescription or communicate with your care team.     To access your existing account, please contact your [facility] or call [phone] for assistance.        Care EveryWhere ID     This is your Care EveryWhere ID. This could be used by other organizations to access your [facility] medical records  [id number]        Your Vitals Were     Pulse Last Period Breastfeeding? BMI (Body Mass Index)          73 10/24/2017 Yes 31.09 kg/m2         Blood Pressure from Last 3 Encounters:   09/18/18 123/84   09/13/18 146/87   08/13/18 135/86    Weight from Last 3 Encounters:   09/18/18 77.1 kg (170 lb)   09/13/18 76.2 kg (167 lb 14.4 oz)    08/13/18 74.8 kg (165 lb)              Today, you had the following     No orders found for display         Today's Medication Changes          These changes are accurate as of 9/18/18 12:15 PM.  If you have any questions, ask your nurse or doctor.               Stop taking these medicines if you haven't already. Please contact your care team if you have questions.     ferrous sulfate 325 (65 Fe) MG tablet   Commonly known as:  IRON                Where to get your medicines      These medications were sent to [facility]/pharmacy #[id number] [place], [place] - [address] [address]     Phone:  [phone]     NIFEdipine ER 30 MG Tb24                Primary Care Provider Office Phone # Fax #    Dahlia Paula Noriega [phone]       [address] [place] [phone]  [facility] [address]        Equal Access to Services     BERT [facility] : Hadii mak saucedo Sokaela, waaxda luqludin, qaybta kaalmaeva forte, kim romero . So [facility] [phone].    ATENCIÓN: Si habla español, tiene a trevizo disposición servicios gratuitos de asistencia lingüística. Jean-Claude al [phone].    We comply with applicable federal civil rights laws and [place] laws. We do not discriminate on the basis of race, color, national origin, age, disability, sex, sexual orientation, or gender identity.            Thank you!     Thank you for choosing [facility]   for your care. Our goal is always to provide you with excellent care. Hearing back from our patients is one way we can continue to improve our services. Please take a few minutes to complete the written survey that you may receive in the mail after your visit with us. Thank you!             Your Updated Medication List - Protect others around you: Learn how to safely use, store and throw away your medicines at www.disposemymeds.org.          This list is accurate as of 9/18/18 12:15 PM.   Always use your most recent med list.                   Brand Name Dispense Instructions for use Diagnosis    acetone (Urine) test Strp     50 each    Test first voiding of the day.    Gestational diabetes       blood glucose monitoring test strip    no brand specified    200 strip    One Touch Verio test strips.  Test 6 times daily.    Gestational diabetes       breast pump Misc     1 each    1 each as needed    High-risk pregnancy, elderly primigravida       NIFEdipine ER 30 MG Tb24    ADALAT CC    90 tablet    Take 1 tablet (30 mg) by mouth daily    Postpartum hypertension       norethindrone 0.35 MG per tablet    MICRONOR    84 tablet    Take 1 tablet (0.35 mg) by mouth daily    General counseling for prescription of oral contraceptives, Postpartum care and examination of lactating mother       prenatal multivitamin plus iron 27-0.8 MG Tabs per tablet      Take 1 tablet by mouth daily        senna-docusate 8.6-50 MG per tablet    SENOKOT-S;PERICOLACE    40 tablet    Take 1-2 tablets by mouth 2 times daily as needed for constipation    S/P  section

## 2018-09-18 NOTE — LETTER
2018       RE: Rona Krishnamurthy  [address] [place] [address]     Dear Colleague,    Thank you for referring your patient, Rona Krishnamurthy, to the [facility]  at [facility]. Please see a copy of my visit note below.    HPI  Patient is here to discuss hypertension. She reports that she was diagnosed with postpartum preeclampsia. She had gestation hypertension during pregnancy. She delivered at 38 weeks, baby was 6 lb 13 oz. She is currently nursing. She also had gestational diabetes.     Current Outpatient Prescriptions   Medication     acetone, Urine, test STRP     blood glucose monitoring (NO BRAND SPECIFIED) test strip     Misc. Devices (BREAST PUMP) MISC     NIFEdipine ER osmotic (ADALAT CC) 30 MG TB24     norethindrone (MICRONOR) 0.35 MG per tablet     Prenatal Vit-Fe Fumarate-FA (PRENATAL MULTIVITAMIN PLUS IRON) 27-0.8 MG TABS per tablet     senna-docusate (SENOKOT-S;PERICOLACE) 8.6-50 MG per tablet     No current facility-administered medications for this visit.      Past Medical History:   Diagnosis Date     Atopic dermatitis 2005     Diabetes (H)     gestational, diet controlled     Hypertension     gestational     Past Medical History:   Diagnosis Date     Atopic dermatitis 2005     Diabetes (H)     gestational, diet controlled     Hypertension     gestational     Past Surgical History:   Procedure Laterality Date     BREAST SURGERY  1998    fibroadenoma removal      SECTION N/A 2018    Procedure:  SECTION;;  Surgeon: Demetra Evangelista MD;  Location:  L+D      TOOTH EXTRACTION W/FORCEP       Family History   Problem Relation Age of Onset     Breast Cancer Other      Aunt - Dad's sister     Breast Cancer Other      Mom's grandma     Prostate Cancer Father      Hypertension Mother      Preeclampsia Mother      Diabetes Paternal Grandmother      Diabetes Paternal Grandfather       Hypertension Sister      Preeclampsia Sister      Social History     Social History     Marital status:      Spouse name: Tj Krishnamurthy     Number of children: N/A     Years of education: N/A     Occupational History     Not on file.     Social History Main Topics     Smoking status: Never Smoker     Smokeless tobacco: Never Used     Alcohol use No     Drug use: Not on file      Comment: Occasional- before pregnancy     Sexual activity: Yes     Partners: Male     Birth control/ protection: None     Other Topics Concern     Not on file     Social History Narrative    ** Merged History Encounter **         How much exercise per week? 1-2 exercise and daily activities    How much calcium per day? In foods and prenatals       How much caffeine per day? 100 150 mg a day    How much vitamin D per day? Un prenatals    Do you/your family wear seatbelts?  Yes    Do you/your family use safety helmets? Yes    Do you/your family use sunscreen? Yes    Do you/your family keep firearms in the home? No    Do you/your family have a smoke detector(s)? Yes        Reviewed linda storm 12-               Vitals:    09/18/18 1125 09/18/18 1126 09/18/18 1127   BP: (!) 148/94 144/84 123/84   Pulse: 76 76 73   Weight: 77.1 kg (170 lb)         Physical Exam   Constitutional: She is well-developed, well-nourished, and in no distress.   HENT:   Head: Normocephalic and atraumatic.   Cardiovascular: Normal rate.    Pulmonary/Chest: Effort normal.   Skin: Skin is warm and dry.   Psychiatric: Mood, memory, affect and judgment normal.   Vitals reviewed.      Assessment and plan:    Rona was seen today for recheck.    Diagnoses and all orders for this visit:    Postpartum hypertension.  Reviewed blood pressure readings with patient.  Her blood pressure is currently within acceptable range.  Recommend to continue with current dose of nifedipine without changes.  Patient was advised on criteria for reducing the dose of  anti-hypertensive therapy.  Recommend home blood pressure monitoring and follow-up in 3 months.  Patient was also advised on risk of recurrence of hypertension during subsequent pregnancy.  She is on birth control pill at present time and has no plans for future pregnancy right now.   -     NIFEdipine ER (ADALAT CC) 30 MG TB24; Take 1 tablet (30 mg) by mouth daily    Total time spent 25 minutes.  More than 50% of the time spent with Ms. Krishnamurthy on counseling / coordinating her care    Torri Todd MD

## 2019-09-28 ENCOUNTER — HEALTH MAINTENANCE LETTER (OUTPATIENT)
Age: 39
End: 2019-09-28

## 2019-11-11 DIAGNOSIS — Z30.09 GENERAL COUNSELING FOR PRESCRIPTION OF ORAL CONTRACEPTIVES: ICD-10-CM

## 2019-11-11 RX ORDER — ACETAMINOPHEN AND CODEINE PHOSPHATE 120; 12 MG/5ML; MG/5ML
0.35 SOLUTION ORAL DAILY
Qty: 84 TABLET | Refills: 4 | Status: SHIPPED | OUTPATIENT
Start: 2019-11-11 | End: 2022-08-09

## 2020-07-13 NOTE — PROGRESS NOTES
"Rona Krishnamurthy is a 39 year old female who is being evaluated via a billable video visit.      The patient has been notified of following:     \"This video visit will be conducted via a call between you and your physician/provider. We have found that certain health care needs can be provided without the need for an in-person physical exam.  This service lets us provide the care you need with a video conversation.  If a prescription is necessary we can send it directly to your pharmacy.  If lab work is needed we can place an order for that and you can then stop by our lab to have the test done at a later time.    Video visits are billed at different rates depending on your insurance coverage.  Please reach out to your insurance provider with any questions.    If during the course of the call the physician/provider feels a video visit is not appropriate, you will not be charged for this service.\"    Patient has given verbal consent for Video visit? Yes  How would you like to obtain your AVS? [employer]  Patient would like the video invitation sent by: Text to cell phone: [phone]  Will anyone else be joining your video visit? No    Subjective     Rona Krishnamurthy is a 39 year old female who presents today via video visit for the following health issues:    History of Present Illness        Hypertension: She presents for follow up of hypertension.  She does check blood pressure  regularly outside of the clinic. Outside blood pressures have been over 140/90. She does not follow a low salt diet.     She eats 0-1 servings of fruits and vegetables daily.She consumes 3 sweetened beverage(s) daily.   She is taking medications regularly.               Review of Systems   Constitutional, HEENT, cardiovascular, pulmonary, gi and gu systems are negative, except as otherwise noted.      Objective             Physical Exam     GENERAL: Healthy, alert and no distress  EYES: Eyes grossly normal to inspection.  No discharge or " erythema, or obvious scleral/conjunctival abnormalities.  RESP: No audible wheeze, cough, or visible cyanosis.  No visible retractions or increased work of breathing.    SKIN: Visible skin clear. No significant rash, abnormal pigmentation or lesions.  NEURO: Cranial nerves grossly intact.  Mentation and speech appropriate for age.  PSYCH: Mentation appears normal, affect normal/bright, judgement and insight intact, normal speech and appearance well-groomed.      Diagnostic Test Results:  Labs reviewed in Epic        Assessment & Plan     1. HTN, goal below 140/90  - chlorthalidone (HYGROTON) 25 MG tablet; Take 1 tablet (25 mg) by mouth daily  Dispense: 30 tablet; Refill: 1       Use medication as directed.    Doron López PA-C  [facility]      Video-Visit Details    Type of service:  Video Visit    Video  Time:15 min    Originating Location (pt. Location): Home    Distant Location (provider location):  [facility]     Platform used for Video Visit: Doximity    No follow-ups on file.       Doron López PA-C

## 2020-07-14 ENCOUNTER — VIRTUAL VISIT (OUTPATIENT)
Dept: FAMILY MEDICINE | Facility: CLINIC | Age: 40
End: 2020-07-14
Payer: COMMERCIAL

## 2020-07-14 DIAGNOSIS — I10 HTN, GOAL BELOW 140/90: Primary | ICD-10-CM

## 2020-07-14 PROCEDURE — 99213 OFFICE O/P EST LOW 20 MIN: CPT | Mod: 95 | Performed by: PHYSICIAN ASSISTANT

## 2020-07-14 RX ORDER — CHLORTHALIDONE 25 MG/1
25 TABLET ORAL DAILY
Qty: 30 TABLET | Refills: 1 | Status: SHIPPED | OUTPATIENT
Start: 2020-07-14 | End: 2020-08-05

## 2020-07-14 NOTE — PATIENT INSTRUCTIONS
Patient Education     Controlling High Blood Pressure  High blood pressure (hypertension) is often called the silent killer. This is because many people who have it, don t know it. High blood pressure can raise your risk of heart attack, stroke, heart disease, and heart failure. Controlling your blood pressure can decrease your risk of these problems. Know your blood pressure and remember to check it regularly. Doing so can save your life.  Blood pressure measurements are given as 2 numbers. Systolic blood pressure is the upper number. This is the pressure when the heart contracts. Diastolic blood pressure is the lower number. This is the pressure when the heart relaxes between beats.  Blood pressure is categorized as normal, elevated, or stage 1 or stage 2 high blood pressure:    Normal blood pressure is systolic of less than 120 and diastolic of less than 80 (120/80)    Elevated blood pressure is systolic of 120 to 129 and diastolic less than 80    Stage 1 high blood pressure is systolic is 130 to 139 or diastolic between 80 to 89    Stage 2 high blood pressure is when systolic is 140 or higher or the diastolic is 90 or higher  Here are some things you can do to help control your blood pressure.    Choose heart-healthy foods    Select low-salt, low-fat foods. Limit sodium intake to 2,400 mg per day or the amount suggested by your healthcare provider.    Limit canned, dried, cured, packaged, and fast foods. These can contain a lot of salt.    Eat 8 to 10 servings of fruits and vegetables every day.    Choose lean meats, fish, or chicken.    Eat whole-grain pasta, brown rice, and beans.    Eat 2 to 3 servings of low-fat or fat-free dairy products.    Ask your doctor about the DASH eating plan. This plan helps reduce blood pressure.    When you go to a restaurant, ask that your meal be prepared with no added salt.    Maintain a healthy weight    Ask your healthcare provider how many calories to eat a day. Then  stick to that number.    Ask your healthcare provider what weight range is healthiest for you. If you are overweight, a weight loss of only 3% to 5% of your body weight can help lower blood pressure. Generally, a good weight loss goal is to lose 10% of your body weight in a year.    Limit snacks and sweets.    Get regular exercise.    Get up and get active    Choose activities you enjoy. Find ones you can do with friends or family. This includes bicycling, dancing, walking, and jogging.    Park farther away from building entrances.    Use stairs instead of the elevator.    When you can, walk or bike instead of driving.    [place] leaves, garden, or do household repairs.    Be active at a moderate to vigorous level of physical activity for at least 40 minutes for a minimum of 3 to 4 days a week.     Manage stress    Make time to relax and enjoy life. Find time to laugh.    Communicate your concerns with your loved ones and your healthcare provider.    Visit with family and friends, and keep up with hobbies.    Limit alcohol and quit smoking    Men should have no more than 2 drinks per day.    Women should have no more than 1 drink per day.    Talk with your healthcare provider about quitting smoking. Smoking significantly increases your risk for heart disease and stroke. Ask your healthcare provider about community smoking cessation programs and other options.    Medicines  If lifestyle changes aren t enough, your healthcare provider may prescribe high blood pressure medicine. Take all medicines as prescribed. If you have any questions about your medicines, ask your healthcare provider before stopping or changing them.  Date Last Reviewed: 4/27/2016 2000-2019 [employer]. [address] [facility], [place] [address]. All rights reserved. This information is not intended as a substitute for professional medical care. Always follow your healthcare professional's instructions.

## 2020-08-05 DIAGNOSIS — I10 HTN, GOAL BELOW 140/90: ICD-10-CM

## 2020-08-05 RX ORDER — CHLORTHALIDONE 25 MG/1
TABLET ORAL
Qty: 30 TABLET | Refills: 0 | Status: SHIPPED | OUTPATIENT
Start: 2020-08-05 | End: 2020-09-08

## 2020-08-05 NOTE — TELEPHONE ENCOUNTER
"Routing refill request to provider for review/approval because:  Labs not current:  See protocol  BP    Requested Prescriptions   Pending Prescriptions Disp Refills     chlorthalidone (HYGROTON) 25 MG tablet [Pharmacy Med Name: CHLORTHALIDONE 25 MG TABLET] 30 tablet 1     Sig: TAKE 1 TABLET BY MOUTH EVERY DAY       Diuretics (Including Combos) Protocol Failed - 8/5/2020  9:30 AM        Failed - Blood pressure under 140/90 in past 12 months     BP Readings from Last 3 Encounters:   09/18/18 123/84   09/13/18 146/87   08/13/18 135/86                 Failed - Normal serum creatinine on file in past 12 months     Recent Labs   Lab Test 08/04/18  0624   CR 0.69              Failed - Normal serum potassium on file in past 12 months     Recent Labs   Lab Test 08/03/18  1247   POTASSIUM 3.7                    Failed - Normal serum sodium on file in past 12 months     Recent Labs   Lab Test 08/03/18  1247   *              Passed - Recent (12 mo) or future (30 days) visit within the authorizing provider's specialty     Patient has had an office visit with the authorizing provider or a provider within the authorizing providers department within the previous 12 mos or has a future within next 30 days. See \"Patient Info\" tab in inbasket, or \"Choose Columns\" in Meds & Orders section of the refill encounter.              Passed - Medication is active on med list        Passed - Patient is age 18 or older        Passed - No active pregancy on record        Passed - No positive pregnancy test in past 12 months                   "

## 2020-09-08 DIAGNOSIS — I10 HTN, GOAL BELOW 140/90: ICD-10-CM

## 2020-09-08 NOTE — LETTER
September 14, 2020      Rona Krishnamurthy  [address] [place] [id number]            Your provider has sent a 15 day alexia refill of chlorthalidone (HYGROTON) 25 MG tablet. You are due for an appointment for further refills.Please contact the clinic to schedule an appointment for further refills.      Sincerely,       [facility] Verm[facility] / [facility]IV[facility]

## 2020-09-11 ENCOUNTER — MYC MEDICAL ADVICE (OUTPATIENT)
Dept: FAMILY MEDICINE | Facility: CLINIC | Age: 40
End: 2020-09-11

## 2020-09-11 RX ORDER — CHLORTHALIDONE 25 MG/1
25 TABLET ORAL DAILY
Qty: 15 TABLET | Refills: 0 | Status: SHIPPED | OUTPATIENT
Start: 2020-09-11 | End: 2020-09-24

## 2020-09-11 NOTE — TELEPHONE ENCOUNTER
"Noted that patient has mychart message from provider    \"Last read by Rona Krishnamurthy at 2:13 PM on 9/11/2020\"    Dago Merino RN    "

## 2020-09-11 NOTE — TELEPHONE ENCOUNTER
Doron,  Please see g2One message below. Refill request has also been routed.    Naila Mccracken RN  [facility]

## 2020-09-24 ENCOUNTER — OFFICE VISIT (OUTPATIENT)
Dept: FAMILY MEDICINE | Facility: CLINIC | Age: 40
End: 2020-09-24
Payer: COMMERCIAL

## 2020-09-24 VITALS
OXYGEN SATURATION: 99 % | HEIGHT: 62 IN | TEMPERATURE: 98.4 F | RESPIRATION RATE: 14 BRPM | SYSTOLIC BLOOD PRESSURE: 134 MMHG | HEART RATE: 91 BPM | DIASTOLIC BLOOD PRESSURE: 86 MMHG | BODY MASS INDEX: 26.13 KG/M2 | WEIGHT: 142 LBS

## 2020-09-24 DIAGNOSIS — F41.8 SITUATIONAL ANXIETY: Primary | ICD-10-CM

## 2020-09-24 DIAGNOSIS — I10 HTN, GOAL BELOW 140/90: ICD-10-CM

## 2020-09-24 PROCEDURE — 99214 OFFICE O/P EST MOD 30 MIN: CPT | Performed by: FAMILY MEDICINE

## 2020-09-24 RX ORDER — CHLORTHALIDONE 25 MG/1
25 TABLET ORAL DAILY
Qty: 90 TABLET | Refills: 1 | Status: SHIPPED | OUTPATIENT
Start: 2020-09-24 | End: 2021-03-26

## 2020-09-24 RX ORDER — PROPRANOLOL HYDROCHLORIDE 20 MG/1
20 TABLET ORAL 2 TIMES DAILY PRN
Qty: 30 TABLET | Refills: 2 | Status: SHIPPED | OUTPATIENT
Start: 2020-09-24 | End: 2020-10-06

## 2020-09-24 ASSESSMENT — MIFFLIN-ST. JEOR: SCORE: 1267.36

## 2020-09-24 NOTE — PROGRESS NOTES
"Subjective     Rona Krishnamurthy is a 39 year old female who presents to clinic today for the following health issues:    HPI     Hypertension Follow-up      Do you check your blood pressure regularly outside of the clinic? Yes     Are you following a low salt diet? No    Are your blood pressures ever more than 140 on the top number (systolic) OR more   than 90 on the bottom number (diastolic), for example 140/90? No      How many servings of fruits and vegetables do you eat daily?  2-3    On average, how many sweetened beverages do you drink each day (Examples: soda, juice, sweet tea, etc.  Do NOT count diet or artificially sweetened beverages)?   2-3    How many days per week do you exercise enough to make your heart beat faster? 3 or less    How many minutes a day do you exercise enough to make your heart beat faster? 9 or less    How many days per week do you miss taking your medication? 0    BP Readings from Last 6 Encounters:   09/24/20 134/86   09/18/18 123/84   09/13/18 146/87   08/13/18 135/86   08/05/18 134/80   08/01/18 115/74     Home Bp reviewed and wnl with borderline diastolic readings  Asymptomatic  Taking chlorthalidone 25mg, no reported side effects    Jovani  Physical symptoms in social environments  Public speaking  In anticipation for work and driving        Review of Systems   Constitutional, HEENT, cardiovascular, pulmonary, gi and gu systems are negative, except as otherwise noted.      Objective    /86   Pulse 91   Temp 98.4  F (36.9  C) (Oral)   Resp 14   Ht 1.575 m (5' 2\")   Wt 64.4 kg (142 lb)   SpO2 99%   BMI 25.97 kg/m    Body mass index is 25.97 kg/m .  Physical Exam   GENERAL: healthy, alert and no distress  EYES: Eyes grossly normal to inspection, PERRL and conjunctivae and sclerae normal  NECK: no adenopathy, no asymmetry, masses, or scars and thyroid normal to palpation  CV: regular rate and rhythm, normal S1 S2, no S3 or S4, no murmur, click or rub, no peripheral edema " and peripheral pulses strong  SKIN: no suspicious lesions or rashes  PSYCH: mentation appears normal, affect normal/bright          Assessment & Plan       ICD-10-CM    1. Situational anxiety  F41.8 propranolol (INDERAL) 20 MG tablet   2. HTN, goal below 140/90  I10 chlorthalidone (HYGROTON) 25 MG tablet     Comprehensive metabolic panel (BMP + Alb, Alk Phos, ALT, AST, Total. Bili, TP)     Chlorthalidone refill, return for labwork  Trial of propranolol for situational anxiety  Patient not agreeable to selective serotonin reuptake inhibitor or therapy at this time       Return in about 6 months (around 3/24/2021) for Hypertension/anxiety.    Alberto Tobin MD  [facility]

## 2020-09-29 ENCOUNTER — TELEPHONE (OUTPATIENT)
Dept: FAMILY MEDICINE | Facility: CLINIC | Age: 40
End: 2020-09-29

## 2020-09-29 NOTE — TELEPHONE ENCOUNTER
Please call patient to help set up lab visit for sodium/potassium check.  Not done at most recent visit.  At risk for low sodium and potassium due to chlorthalidone use.     Alberto Tobin MD

## 2020-09-29 NOTE — TELEPHONE ENCOUNTER
Pt was given provider's message as written. Lab only appt scheduled.    Naila Mccracken RN  [facility]

## 2020-09-30 DIAGNOSIS — I10 HTN, GOAL BELOW 140/90: ICD-10-CM

## 2020-09-30 PROCEDURE — 36415 COLL VENOUS BLD VENIPUNCTURE: CPT | Performed by: FAMILY MEDICINE

## 2020-09-30 PROCEDURE — 80053 COMPREHEN METABOLIC PANEL: CPT | Performed by: FAMILY MEDICINE

## 2020-10-01 LAB
ALBUMIN SERPL-MCNC: 4.4 G/DL (ref 3.4–5)
ALP SERPL-CCNC: 82 U/L (ref 40–150)
ALT SERPL W P-5'-P-CCNC: 43 U/L (ref 0–50)
ANION GAP SERPL CALCULATED.3IONS-SCNC: 10 MMOL/L (ref 3–14)
AST SERPL W P-5'-P-CCNC: 21 U/L (ref 0–45)
BILIRUB SERPL-MCNC: 0.3 MG/DL (ref 0.2–1.3)
BUN SERPL-MCNC: 7 MG/DL (ref 7–30)
CALCIUM SERPL-MCNC: 9.7 MG/DL (ref 8.5–10.1)
CHLORIDE SERPL-SCNC: 95 MMOL/L (ref 94–109)
CO2 SERPL-SCNC: 30 MMOL/L (ref 20–32)
CREAT SERPL-MCNC: 0.72 MG/DL (ref 0.52–1.04)
GFR SERPL CREATININE-BSD FRML MDRD: >90 ML/MIN/{1.73_M2}
GLUCOSE SERPL-MCNC: 134 MG/DL (ref 70–99)
POTASSIUM SERPL-SCNC: 2.9 MMOL/L (ref 3.4–5.3)
PROT SERPL-MCNC: 8.5 G/DL (ref 6.8–8.8)
SODIUM SERPL-SCNC: 135 MMOL/L (ref 133–144)

## 2020-10-02 DIAGNOSIS — E87.6 HYPOKALEMIA: ICD-10-CM

## 2020-10-02 DIAGNOSIS — I10 BENIGN ESSENTIAL HYPERTENSION: Primary | ICD-10-CM

## 2020-10-02 RX ORDER — LISINOPRIL 20 MG/1
20 TABLET ORAL DAILY
Qty: 30 TABLET | Refills: 1 | Status: SHIPPED | OUTPATIENT
Start: 2020-10-02 | End: 2020-10-29

## 2020-10-02 RX ORDER — POTASSIUM CHLORIDE 750 MG/1
10 TABLET, EXTENDED RELEASE ORAL 2 TIMES DAILY
Qty: 28 TABLET | Refills: 0 | Status: SHIPPED | OUTPATIENT
Start: 2020-10-02 | End: 2020-10-16

## 2020-10-05 DIAGNOSIS — F41.8 SITUATIONAL ANXIETY: ICD-10-CM

## 2020-10-06 RX ORDER — PROPRANOLOL HYDROCHLORIDE 20 MG/1
20 TABLET ORAL 2 TIMES DAILY PRN
Qty: 30 TABLET | Refills: 2 | Status: SHIPPED | OUTPATIENT
Start: 2020-10-06 | End: 2021-07-30

## 2020-10-26 DIAGNOSIS — I10 BENIGN ESSENTIAL HYPERTENSION: ICD-10-CM

## 2020-10-26 DIAGNOSIS — E87.6 HYPOKALEMIA: ICD-10-CM

## 2020-10-29 RX ORDER — LISINOPRIL 20 MG/1
20 TABLET ORAL DAILY
Qty: 30 TABLET | Refills: 1 | Status: SHIPPED | OUTPATIENT
Start: 2020-10-29 | End: 2021-01-27

## 2021-01-10 ENCOUNTER — HEALTH MAINTENANCE LETTER (OUTPATIENT)
Age: 41
End: 2021-01-10

## 2021-06-28 ENCOUNTER — OFFICE VISIT (OUTPATIENT)
Dept: FAMILY MEDICINE | Facility: CLINIC | Age: 41
End: 2021-06-28
Payer: COMMERCIAL

## 2021-06-28 VITALS
HEART RATE: 86 BPM | SYSTOLIC BLOOD PRESSURE: 136 MMHG | WEIGHT: 154.6 LBS | BODY MASS INDEX: 28.45 KG/M2 | RESPIRATION RATE: 19 BRPM | DIASTOLIC BLOOD PRESSURE: 82 MMHG | OXYGEN SATURATION: 99 % | HEIGHT: 62 IN

## 2021-06-28 DIAGNOSIS — Z12.31 VISIT FOR SCREENING MAMMOGRAM: ICD-10-CM

## 2021-06-28 DIAGNOSIS — R19.4 CHANGE IN BOWEL HABITS: Primary | ICD-10-CM

## 2021-06-28 PROCEDURE — 99213 OFFICE O/P EST LOW 20 MIN: CPT | Performed by: FAMILY MEDICINE

## 2021-06-28 ASSESSMENT — PAIN SCALES - GENERAL: PAINLEVEL: NO PAIN (0)

## 2021-06-28 ASSESSMENT — MIFFLIN-ST. JEOR: SCORE: 1325.89

## 2021-06-28 NOTE — PROGRESS NOTES
"    Assessment & Plan     Change in bowel habits  Advised to continue to watch  If continued change in Bowel habits  Should  get a colonoscopy  - GASTROENTEROLOGY ADULT REF PROCEDURE ONLY; Future    Visit for screening mammogram    - MA Screening Digital Bilateral; Future    Return in about 1 month (around 7/28/2021) for Physical Exam.    Yolande Cornell MD  [facility] GAY Moreno is a 40 year old who presents for the following health issues   HPI   Chief Complaint   Patient presents with     Bowl Movements     Hemmorroids.    Pt has had Hemorrhoids for a Long Time-no change  Bowel movements are sticky and soft and she has to clean herself  Says bowel movement are Thinner and consistency has changed  No melena   No Bleeding  No bleeding  Pt is taking probiotics and Increased Fiber   No abdominal pain  Pt has been taking Probiotics and Fiber    Review of Systems   Rest of the ROS is Negative except see above and Problem list [stable]        Objective    /82   Pulse 86   Resp 19   Ht 1.585 m (5' 2.4\")   Wt 70.1 kg (154 lb 9.6 oz)   SpO2 99%   BMI 27.91 kg/m    Body mass index is 27.91 kg/m .  Physical Exam   GENERAL: healthy, alert and no distress  RESP: lungs clear to auscultation - no rales, rhonchi or wheezes  CV: regular rate and rhythm, normal S1 S2, no S3 or S4, no murmur, click or rub, no peripheral edema and peripheral pulses strong  ABDOMEN: soft, nontender, no hepatosplenomegaly, no masses and bowel sounds normal  MS: no gross musculoskeletal defects noted, no edema            "

## 2021-07-03 ENCOUNTER — HEALTH MAINTENANCE LETTER (OUTPATIENT)
Age: 41
End: 2021-07-03

## 2021-07-06 ENCOUNTER — ANCILLARY PROCEDURE (OUTPATIENT)
Dept: MAMMOGRAPHY | Facility: CLINIC | Age: 41
End: 2021-07-06
Attending: FAMILY MEDICINE
Payer: COMMERCIAL

## 2021-07-06 DIAGNOSIS — Z12.31 VISIT FOR SCREENING MAMMOGRAM: ICD-10-CM

## 2021-07-06 PROCEDURE — 77067 SCR MAMMO BI INCL CAD: CPT | Mod: TC | Performed by: RADIOLOGY

## 2021-07-30 ENCOUNTER — OFFICE VISIT (OUTPATIENT)
Dept: FAMILY MEDICINE | Facility: CLINIC | Age: 41
End: 2021-07-30
Payer: COMMERCIAL

## 2021-07-30 VITALS
TEMPERATURE: 97.9 F | SYSTOLIC BLOOD PRESSURE: 136 MMHG | OXYGEN SATURATION: 100 % | WEIGHT: 156.2 LBS | BODY MASS INDEX: 28.2 KG/M2 | HEART RATE: 77 BPM | DIASTOLIC BLOOD PRESSURE: 84 MMHG

## 2021-07-30 DIAGNOSIS — I10 BENIGN ESSENTIAL HYPERTENSION: ICD-10-CM

## 2021-07-30 DIAGNOSIS — E87.6 HYPOKALEMIA: ICD-10-CM

## 2021-07-30 PROCEDURE — 99213 OFFICE O/P EST LOW 20 MIN: CPT | Performed by: FAMILY MEDICINE

## 2021-07-30 RX ORDER — LISINOPRIL 20 MG/1
20 TABLET ORAL DAILY
Qty: 90 TABLET | Refills: 1 | Status: SHIPPED | OUTPATIENT
Start: 2021-07-30 | End: 2022-01-18

## 2021-07-30 NOTE — PATIENT INSTRUCTIONS
Tiffanie    It was a pleasure seeing you in clinic today.  Here's the plan:    1. Hypertension - Your pressures at home are fine so no med changes needed.  Exercise, low sodium diet, look into the mediterranean diet to find healthy foods.  Touch base with me in about 6 months or so.    Let me know if you have questions.    Alberto Tobin MD

## 2021-07-30 NOTE — PROGRESS NOTES
Assessment & Plan       ICD-10-CM    1. Benign essential hypertension  I10 lisinopril (ZESTRIL) 20 MG tablet   2. Hypokalemia  E87.6 lisinopril (ZESTRIL) 20 MG tablet         Review of external notes as documented elsewhere in note        Patient Instructions   Tiffanie    It was a pleasure seeing you in clinic today.  Here's the plan:    1. Hypertension - Your pressures at home are fine so no med changes needed.  Exercise, low sodium diet, look into the mediterranean diet to find healthy foods.  Touch base with me in about 6 months or so.    Let me know if you have questions.    Alberto Tobin MD        Return in about 6 months (around 1/30/2022) for Hypertension.    Alberto Tobin MD  [facility] GAY Moreno is a 40 year old who presents for the following health issues     HPI     Hypertension Follow-up      Do you check your blood pressure regularly outside of the clinic? Yes     Are you following a low salt diet? No    Are your blood pressures ever more than 140 on the top number (systolic) OR more   than 90 on the bottom number (diastolic), for example 140/90? Yes only at the doctors office at Home bp's 115-135/70's-80's  Asymptomatic  Taking lisinopril 20mg    BP Readings from Last 6 Encounters:   07/30/21 136/84   06/28/21 136/82   09/24/20 134/86   09/18/18 123/84   09/13/18 146/87   08/13/18 135/86     Wt Readings from Last 4 Encounters:   07/30/21 70.9 kg (156 lb 3.2 oz)   06/28/21 70.1 kg (154 lb 9.6 oz)   09/24/20 64.4 kg (142 lb)   09/18/18 77.1 kg (170 lb)           Review of Systems   Constitutional, HEENT, cardiovascular, pulmonary, gi and gu systems are negative, except as otherwise noted.      Objective    /84   Pulse 77   Temp 97.9  F (36.6  C) (Oral)   Wt 70.9 kg (156 lb 3.2 oz)   SpO2 100%   BMI 28.20 kg/m    Body mass index is 28.2 kg/m .  Physical Exam   GENERAL: healthy, alert and no distress  EYES: Eyes grossly normal to inspection, PERRL and  conjunctivae and sclerae normal  NECK: no adenopathy, no asymmetry, masses, or scars and thyroid normal to palpation  SKIN: no suspicious lesions or rashes  PSYCH: mentation appears normal, affect normal/bright

## 2021-10-23 ENCOUNTER — HEALTH MAINTENANCE LETTER (OUTPATIENT)
Age: 41
End: 2021-10-23

## 2022-01-18 ENCOUNTER — VIRTUAL VISIT (OUTPATIENT)
Dept: FAMILY MEDICINE | Facility: CLINIC | Age: 42
End: 2022-01-18
Payer: COMMERCIAL

## 2022-01-18 DIAGNOSIS — E87.6 HYPOKALEMIA: ICD-10-CM

## 2022-01-18 DIAGNOSIS — I10 BENIGN ESSENTIAL HYPERTENSION: ICD-10-CM

## 2022-01-18 PROCEDURE — 99214 OFFICE O/P EST MOD 30 MIN: CPT | Mod: GT | Performed by: FAMILY MEDICINE

## 2022-01-18 RX ORDER — LISINOPRIL 20 MG/1
20 TABLET ORAL DAILY
Qty: 90 TABLET | Refills: 1 | Status: SHIPPED | OUTPATIENT
Start: 2022-01-18 | End: 2022-07-20

## 2022-01-18 NOTE — PROGRESS NOTES
Tiffanie is a 41 year old who is being evaluated via a billable video visit.      How would you like to obtain your AVS? MyChart  If the video visit is dropped, the invitation should be resent by: Text to cell phone: [phone]  Will anyone else be joining your video visit? No      Video Length 8 mins    Assessment & Plan       ICD-10-CM    1. Benign essential hypertension  I10 lisinopril (ZESTRIL) 20 MG tablet   2. Hypokalemia  E87.6 lisinopril (ZESTRIL) 20 MG tablet         Review of external notes as documented elsewhere in note          There are no Patient Instructions on file for this visit.    No follow-ups on file.    Alberto Tobin MD  [facility]    Pop Moreno is a 41 year old who presents for the following health issues     HPI     Hypertension Follow-up      Do you check your blood pressure regularly outside of the clinic? Yes     Are you following a low salt diet? Yes    Are your blood pressures ever more than 140 on the top number (systolic) OR more   than 90 on the bottom number (diastolic), for example 140/90? No    Rona presents for HTN visit.    Current medications: lisinopril.  Side effects:  none.    Home Bp's are: wnl.    Exercise: some.    Low sodium diet:  yes.  HUSEYIN symptoms none.           Review of Systems   Constitutional, HEENT, cardiovascular, pulmonary, gi and gu systems are negative, except as otherwise noted.      Objective           Vitals:  No vitals were obtained today due to virtual visit.    Physical Exam   GENERAL: Healthy, alert and no distress  EYES: Eyes grossly normal to inspection.  No discharge or erythema, or obvious scleral/conjunctival abnormalities.  RESP: No audible wheeze, cough, or visible cyanosis.  No visible retractions or increased work of breathing.    SKIN: Visible skin clear. No significant rash, abnormal pigmentation or lesions.  NEURO: Cranial nerves grossly intact.  Mentation and speech appropriate for age.  PSYCH:  Mentation appears normal, affect normal/bright, judgement and insight intact, normal speech and appearance well-groomed.                Video-Visit Details    Type of service:  Video Visit    Video Length 8 mins    Originating Location (pt. Location): Home    Distant Location (provider location):  [facility]     Platform used for Video Visit: VGBio

## 2022-02-12 ENCOUNTER — HEALTH MAINTENANCE LETTER (OUTPATIENT)
Age: 42
End: 2022-02-12

## 2022-07-18 DIAGNOSIS — E87.6 HYPOKALEMIA: ICD-10-CM

## 2022-07-18 DIAGNOSIS — I10 BENIGN ESSENTIAL HYPERTENSION: ICD-10-CM

## 2022-07-18 NOTE — TELEPHONE ENCOUNTER
Reason for Call:  Medication or medication refill:    Do you use a [facility]?  Name of the pharmacy and phone number for the current request:  [facility] in [place]    Name of the medication requested: lisinopril (ZESTRIL) 20 MG tablet    Other request: patient is scheduled for a physical on August 9th, but will be out of medication before then.  Can she get a 30 day supply to get her thru?    Can we leave a detailed message on this number? YES    Phone number patient can be reached at: Home number on file [phone] (home)    Best Time: anytime    Call taken on 7/18/2022 at 1:51 PM by Denice Curry

## 2022-07-20 RX ORDER — LISINOPRIL 20 MG/1
20 TABLET ORAL DAILY
Qty: 90 TABLET | Refills: 0 | Status: SHIPPED | OUTPATIENT
Start: 2022-07-20 | End: 2022-08-09

## 2022-07-20 NOTE — TELEPHONE ENCOUNTER
Medication is being filled for 1 time refill only due to:  Patient needs to be seen because need appt.   Please inform refill sent.

## 2022-08-02 ASSESSMENT — ENCOUNTER SYMPTOMS
HEMATOCHEZIA: 0
DIARRHEA: 0
NAUSEA: 0
COUGH: 0
SORE THROAT: 0
ARTHRALGIAS: 0
DYSURIA: 0
SHORTNESS OF BREATH: 0
NERVOUS/ANXIOUS: 1
FREQUENCY: 0
HEADACHES: 0
ABDOMINAL PAIN: 0
CHILLS: 0
JOINT SWELLING: 0
FEVER: 0
BREAST MASS: 0
HEMATURIA: 0
EYE PAIN: 0
WEAKNESS: 0
MYALGIAS: 0
PARESTHESIAS: 0
HEARTBURN: 0
CONSTIPATION: 0
PALPITATIONS: 0

## 2022-08-09 ENCOUNTER — OFFICE VISIT (OUTPATIENT)
Dept: FAMILY MEDICINE | Facility: CLINIC | Age: 42
End: 2022-08-09
Payer: COMMERCIAL

## 2022-08-09 VITALS
HEART RATE: 83 BPM | BODY MASS INDEX: 28.56 KG/M2 | SYSTOLIC BLOOD PRESSURE: 138 MMHG | DIASTOLIC BLOOD PRESSURE: 72 MMHG | WEIGHT: 161.2 LBS | HEIGHT: 63 IN | OXYGEN SATURATION: 99 % | TEMPERATURE: 97.8 F

## 2022-08-09 DIAGNOSIS — E87.6 HYPOKALEMIA: ICD-10-CM

## 2022-08-09 DIAGNOSIS — Z00.00 ROUTINE GENERAL MEDICAL EXAMINATION AT A HEALTH CARE FACILITY: Primary | ICD-10-CM

## 2022-08-09 DIAGNOSIS — I10 BENIGN ESSENTIAL HYPERTENSION: ICD-10-CM

## 2022-08-09 DIAGNOSIS — Z86.32 HISTORY OF GESTATIONAL DIABETES: ICD-10-CM

## 2022-08-09 DIAGNOSIS — Z12.31 VISIT FOR SCREENING MAMMOGRAM: ICD-10-CM

## 2022-08-09 DIAGNOSIS — Z11.59 NEED FOR HEPATITIS C SCREENING TEST: ICD-10-CM

## 2022-08-09 DIAGNOSIS — F43.21 GRIEF: ICD-10-CM

## 2022-08-09 DIAGNOSIS — Z12.4 CERVICAL CANCER SCREENING: ICD-10-CM

## 2022-08-09 DIAGNOSIS — Z30.09 GENERAL COUNSELING FOR PRESCRIPTION OF ORAL CONTRACEPTIVES: ICD-10-CM

## 2022-08-09 DIAGNOSIS — K64.4 EXTERNAL HEMORRHOIDS: ICD-10-CM

## 2022-08-09 PROBLEM — O99.013 ANEMIA DURING PREGNANCY IN THIRD TRIMESTER: Status: RESOLVED | Noted: 2018-05-18 | Resolved: 2022-08-09

## 2022-08-09 PROBLEM — Z00.6 RESEARCH STUDY PATIENT: Status: RESOLVED | Noted: 2018-02-27 | Resolved: 2022-08-09

## 2022-08-09 PROBLEM — Z98.891 S/P CESAREAN SECTION: Status: RESOLVED | Noted: 2018-07-29 | Resolved: 2022-08-09

## 2022-08-09 PROBLEM — O24.410 DIET CONTROLLED GESTATIONAL DIABETES MELLITUS (GDM) IN THIRD TRIMESTER: Status: RESOLVED | Noted: 2018-05-18 | Resolved: 2022-08-09

## 2022-08-09 PROBLEM — O13.3 PREGNANCY-INDUCED HYPERTENSION IN THIRD TRIMESTER: Status: RESOLVED | Noted: 2018-06-01 | Resolved: 2022-08-09

## 2022-08-09 PROBLEM — O09.519 HIGH-RISK PREGNANCY, ELDERLY PRIMIGRAVIDA: Status: RESOLVED | Noted: 2017-12-21 | Resolved: 2022-08-09

## 2022-08-09 PROBLEM — R03.0 ELEVATED BLOOD PRESSURE READING WITHOUT DIAGNOSIS OF HYPERTENSION: Status: RESOLVED | Noted: 2018-02-19 | Resolved: 2022-08-09

## 2022-08-09 LAB
ANION GAP SERPL CALCULATED.3IONS-SCNC: 4 MMOL/L (ref 3–14)
BASOPHILS # BLD AUTO: 0 10E3/UL (ref 0–0.2)
BASOPHILS NFR BLD AUTO: 1 %
BUN SERPL-MCNC: 10 MG/DL (ref 7–30)
CALCIUM SERPL-MCNC: 9.7 MG/DL (ref 8.5–10.1)
CHLORIDE BLD-SCNC: 106 MMOL/L (ref 94–109)
CHOLEST SERPL-MCNC: 160 MG/DL
CO2 SERPL-SCNC: 30 MMOL/L (ref 20–32)
CREAT SERPL-MCNC: 0.73 MG/DL (ref 0.52–1.04)
EOSINOPHIL # BLD AUTO: 0.1 10E3/UL (ref 0–0.7)
EOSINOPHIL NFR BLD AUTO: 1 %
ERYTHROCYTE [DISTWIDTH] IN BLOOD BY AUTOMATED COUNT: 12 % (ref 10–15)
FASTING STATUS PATIENT QL REPORTED: ABNORMAL
GFR SERPL CREATININE-BSD FRML MDRD: >90 ML/MIN/1.73M2
GLUCOSE BLD-MCNC: 121 MG/DL (ref 70–99)
HBA1C MFR BLD: 5.7 % (ref 0–5.6)
HCT VFR BLD AUTO: 41.2 % (ref 35–47)
HCV AB SERPL QL IA: NONREACTIVE
HDLC SERPL-MCNC: 44 MG/DL
HGB BLD-MCNC: 13.9 G/DL (ref 11.7–15.7)
LDLC SERPL CALC-MCNC: 91 MG/DL
LYMPHOCYTES # BLD AUTO: 1.8 10E3/UL (ref 0.8–5.3)
LYMPHOCYTES NFR BLD AUTO: 29 %
MCH RBC QN AUTO: 30 PG (ref 26.5–33)
MCHC RBC AUTO-ENTMCNC: 33.7 G/DL (ref 31.5–36.5)
MCV RBC AUTO: 89 FL (ref 78–100)
MONOCYTES # BLD AUTO: 0.5 10E3/UL (ref 0–1.3)
MONOCYTES NFR BLD AUTO: 8 %
NEUTROPHILS # BLD AUTO: 3.8 10E3/UL (ref 1.6–8.3)
NEUTROPHILS NFR BLD AUTO: 61 %
NONHDLC SERPL-MCNC: 116 MG/DL
PLATELET # BLD AUTO: 269 10E3/UL (ref 150–450)
POTASSIUM BLD-SCNC: 4.1 MMOL/L (ref 3.4–5.3)
RBC # BLD AUTO: 4.64 10E6/UL (ref 3.8–5.2)
SODIUM SERPL-SCNC: 140 MMOL/L (ref 133–144)
TRIGL SERPL-MCNC: 126 MG/DL
WBC # BLD AUTO: 6.2 10E3/UL (ref 4–11)

## 2022-08-09 PROCEDURE — 80048 BASIC METABOLIC PNL TOTAL CA: CPT | Performed by: PHYSICIAN ASSISTANT

## 2022-08-09 PROCEDURE — 87624 HPV HI-RISK TYP POOLED RSLT: CPT | Performed by: PHYSICIAN ASSISTANT

## 2022-08-09 PROCEDURE — 80061 LIPID PANEL: CPT | Performed by: PHYSICIAN ASSISTANT

## 2022-08-09 PROCEDURE — 83036 HEMOGLOBIN GLYCOSYLATED A1C: CPT | Performed by: PHYSICIAN ASSISTANT

## 2022-08-09 PROCEDURE — 86803 HEPATITIS C AB TEST: CPT | Performed by: PHYSICIAN ASSISTANT

## 2022-08-09 PROCEDURE — 85025 COMPLETE CBC W/AUTO DIFF WBC: CPT | Performed by: PHYSICIAN ASSISTANT

## 2022-08-09 PROCEDURE — 99396 PREV VISIT EST AGE 40-64: CPT | Performed by: PHYSICIAN ASSISTANT

## 2022-08-09 PROCEDURE — 36415 COLL VENOUS BLD VENIPUNCTURE: CPT | Performed by: PHYSICIAN ASSISTANT

## 2022-08-09 PROCEDURE — G0145 SCR C/V CYTO,THINLAYER,RESCR: HCPCS | Performed by: PHYSICIAN ASSISTANT

## 2022-08-09 PROCEDURE — 99213 OFFICE O/P EST LOW 20 MIN: CPT | Mod: 25 | Performed by: PHYSICIAN ASSISTANT

## 2022-08-09 RX ORDER — ACETAMINOPHEN AND CODEINE PHOSPHATE 120; 12 MG/5ML; MG/5ML
0.35 SOLUTION ORAL DAILY
Qty: 84 TABLET | Refills: 4 | Status: SHIPPED | OUTPATIENT
Start: 2022-08-09 | End: 2023-09-25

## 2022-08-09 RX ORDER — LISINOPRIL 20 MG/1
20 TABLET ORAL DAILY
Qty: 90 TABLET | Refills: 3 | Status: SHIPPED | OUTPATIENT
Start: 2022-08-09 | End: 2023-08-28

## 2022-08-09 ASSESSMENT — ENCOUNTER SYMPTOMS
HEMATOCHEZIA: 0
NAUSEA: 0
CONSTIPATION: 0
PARESTHESIAS: 0
PALPITATIONS: 0
DYSURIA: 0
HEADACHES: 0
HEMATURIA: 0
COUGH: 0
FEVER: 0
BREAST MASS: 0
CHILLS: 0
DIARRHEA: 0
JOINT SWELLING: 0
FREQUENCY: 0
SHORTNESS OF BREATH: 0
ARTHRALGIAS: 0
WEAKNESS: 0
NERVOUS/ANXIOUS: 1
ABDOMINAL PAIN: 0
EYE PAIN: 0
SORE THROAT: 0
HEARTBURN: 0
MYALGIAS: 0

## 2022-08-09 NOTE — PATIENT INSTRUCTIONS
Schedule mammogram.     Preventive Health Recommendations  Female Ages 40 to 49    Yearly exam:   See your health care provider every year in order to  Review health changes.   Discuss preventive care.    Review your medicines if your doctor prescribed any.    Get a Pap test every three years (unless you have an abnormal result and your provider advises testing more often).    If you get Pap tests with HPV test, you only need to test every 5 years, unless you have an abnormal result. You do not need a Pap test if your uterus was removed (hysterectomy) and you have not had cancer.    You should be tested each year for STDs (sexually transmitted diseases), if you're at risk.   Ask your doctor if you should have a mammogram.    Have a colonoscopy (test for colon cancer) if someone in your family has had colon cancer or polyps before age 50.     Have a cholesterol test every 5 years.     Have a diabetes test (fasting glucose) after age 45. If you are at risk for diabetes, you should have this test every 3 years.    Shots: Get a flu shot each year. Get a tetanus shot every 10 years.     Nutrition:   Eat at least 5 servings of fruits and vegetables each day.  Eat whole-grain bread, whole-wheat pasta and brown rice instead of white grains and rice.  Get adequate Calcium and Vitamin D.      Lifestyle  Exercise at least 150 minutes a week (an average of 30 minutes a day, 5 days a week). This will help you control your weight and prevent disease.  Limit alcohol to one drink per day.  No smoking.   Wear sunscreen to prevent skin cancer.  See your dentist every six months for an exam and cleaning.

## 2022-08-09 NOTE — PROGRESS NOTES
SUBJECTIVE:   CC: Rona Krishnamurthy is an 41 year old woman who presents for preventive health visit.       Patient has been advised of split billing requirements and indicates understanding: Yes  Healthy Habits:     Getting at least 3 servings of Calcium per day:  Yes    Bi-annual eye exam:  NO    Dental care twice a year:  NO    Sleep apnea or symptoms of sleep apnea:  None    Diet:  Regular (no restrictions)    Frequency of exercise:  2-3 days/week    Duration of exercise:  Less than 15 minutes    Taking medications regularly:  Yes    Medication side effects:  None    PHQ-2 Total Score: 0    Additional concerns today:  Yes    External hemorrhoids: Not painful. No bleeding noted in stool or while wiping. Bothersome to her as it is harder to get clean.      Grief:  passed away March 2022. Pt feels supportive from friends, family and support group.     Today's PHQ-2 Score:   PHQ-2 ( 1999 Pf[employer]) 8/2/2022   Q1: Little interest or pleasure in doing things 0   Q2: Feeling down, depressed or hopeless 0   PHQ-2 Score 0   Q1: Little interest or pleasure in doing things Not at all   Q2: Feeling down, depressed or hopeless Not at all   PHQ-2 Score 0       Abuse: Current or Past (Physical, Sexual or Emotional) - No  Do you feel safe in your environment? Yes        Social History     Tobacco Use     Smoking status: Never Smoker     Smokeless tobacco: Never Used   Substance Use Topics     Alcohol use: No     If you drink alcohol do you typically have >3 drinks per day or >7 drinks per week? No    No flowsheet data found.    Reviewed orders with patient.  Reviewed health maintenance and updated orders accordingly - Yes  Lab work is in process    Breast Cancer Screening:    FHS-7:   Breast CA Risk Assessment (FHS-7) 8/2/2022   Did any of your first-degree relatives have breast or ovarian cancer? No   Did any of your relatives have bilateral breast cancer? Unknown   Did any man in your family have breast cancer? No    Did any woman in your family have breast and ovarian cancer? No   Did any woman in your family have breast cancer before age 50 y? No   Do you have 2 or more relatives with breast and/or ovarian cancer? Yes   Do you have 2 or more relatives with breast and/or bowel cancer? Yes     click delete button to remove this line now  Mammogram Screening - Offered annual screening and updated Health Maintenance for mutual plan based on risk factor consideration. She gets annual mammograms.     Pertinent mammograms are reviewed under the imaging tab.    History of abnormal Pap smear: NO - age 30-65 PAP every 5 years with negative HPV co-testing recommended  PAP / HPV Latest Ref Rng & Units 10/3/2017   PAP (Historical) - NIL   HPV16 NEG:Negative Negative   HPV18 NEG:Negative Negative   HRHPV NEG:Negative Negative     Reviewed and updated as needed this visit by clinical staff   Tobacco  Allergies  Meds  Problems  Med Hx  Surg Hx  Fam Hx            Reviewed and updated as needed this visit by Provider   Tobacco  Allergies  Meds  Problems  Med Hx  Surg Hx  Fam Hx             Review of Systems   Constitutional: Negative for chills and fever.   HENT: Negative for congestion, ear pain, hearing loss and sore throat.    Eyes: Negative for pain and visual disturbance.   Respiratory: Negative for cough and shortness of breath.    Cardiovascular: Negative for chest pain, palpitations and peripheral edema.   Gastrointestinal: Negative for abdominal pain, constipation, diarrhea, heartburn, hematochezia and nausea.   Breasts:  Negative for tenderness, breast mass and discharge.   Genitourinary: Positive for vaginal discharge. Negative for dysuria, frequency, genital sores, hematuria, pelvic pain, urgency and vaginal bleeding.   Musculoskeletal: Negative for arthralgias, joint swelling and myalgias.   Skin: Negative for rash.   Neurological: Negative for weakness, headaches and paresthesias.   Psychiatric/Behavioral: Negative  "for mood changes. The patient is nervous/anxious.         OBJECTIVE:   /72   Pulse 83   Temp 97.8  F (36.6  C) (Oral)   Ht 1.59 m (5' 2.6\")   Wt 73.1 kg (161 lb 3.2 oz)   SpO2 99%   Breastfeeding No   BMI 28.92 kg/m    Physical Exam  GENERAL: healthy, alert and no distress  EYES: Eyes grossly normal to inspection, PERRL and conjunctivae and sclerae normal  HENT: ear canals and TM's normal, nose and mouth without ulcers or lesions  NECK: no adenopathy, no asymmetry, masses, or scars and thyroid normal to palpation  RESP: lungs clear to auscultation - no rales, rhonchi or wheezes  CV: regular rate and rhythm, normal S1 S2, no S3 or S4, no murmur, click or rub, no peripheral edema  ABDOMEN: soft, nontender, no hepatosplenomegaly, no masses and bowel sounds normal   (female): normal female external genitalia, normal urethral meatus, vaginal mucosa pink, moist, well rugated, and normal cervix/adnexa/uterus without masses or discharge  RECTAL: Two pea sized non-thrombosed external hemorrhoids, non-tender. No internal rectal exam completed.    MS: no gross musculoskeletal defects noted, no edema  SKIN: no suspicious lesions or rashes  NEURO: Normal strength and tone, mentation intact and speech normal  PSYCH: mentation appears normal, affect normal    Diagnostic Test Results:  Labs reviewed in Epic    ASSESSMENT/PLAN:       ICD-10-CM    1. Routine general medical examination at a health care facility  Z00.00 Lipid panel reflex to direct LDL Non-fasting     CBC with platelets and differential     Lipid panel reflex to direct LDL Non-fasting     CBC with platelets and differential   2. Need for hepatitis C screening test  Z11.59 Hepatitis C Screen Reflex to HCV RNA Quant and Genotype     Hepatitis C Screen Reflex to HCV RNA Quant and Genotype   3. Visit for screening mammogram  Z12.31 MA SCREENING DIGITAL BILAT - Future  (s+30)   4. Cervical cancer screening  Z12.4 Pap Screen with HPV - recommended age 30 - 65 " "years   5. Benign essential hypertension  I10 lisinopril (ZESTRIL) 20 MG tablet     Basic metabolic panel  (Ca, Cl, CO2, Creat, Gluc, K, Na, BUN)     Basic metabolic panel  (Ca, Cl, CO2, Creat, Gluc, K, Na, BUN)   6. Hypokalemia  E87.6 lisinopril (ZESTRIL) 20 MG tablet   7. General counseling for prescription of oral contraceptives  Z30.09 norethindrone (MICRONOR) 0.35 MG tablet   8. External hemorrhoids  K64.4 Educated pt on use of over the counter Preparation H suppositories and sitz baths.    9. History of gestational diabetes  Z86.32 Hemoglobin A1c     Hemoglobin A1c   10. Grief  F43.21 Pt not interested in medication help or a counseling referral. Pt feels supported at home and will reach out if she does start to feel medication would be helpful.        COUNSELING:  Reviewed preventive health counseling, as reflected in patient instructions       Regular exercise       Contraception       Consider Hep C screening for all patients one time for ages 18-79 years    Estimated body mass index is 28.92 kg/m  as calculated from the following:    Height as of this encounter: 1.59 m (5' 2.6\").    Weight as of this encounter: 73.1 kg (161 lb 3.2 oz).    Weight management plan: Discussed healthy diet and exercise guidelines    She reports that she has never smoked. She has never used smokeless tobacco.      Counseling Resources:  ATP IV Guidelines  Pooled Cohorts Equation Calculator  Breast Cancer Risk Calculator  BRCA-Related Cancer Risk Assessment: FHS-7 Tool  FRAX Risk Assessment  ICSI Preventive Guidelines  Dietary Guidelines for Americans, 2010  USDA's MyPlate  ASA Prophylaxis  Lung CA Screening    AUDREY Dupree. 8/9/2022    Humera Garcia PA-C  [facility]  The student Deonna James PAS2 acted as a scribe and the encounter documented above was completely performed by myself and the documentation reflects the work I have performed today.   Humera Garcia PA-C     "

## 2022-08-10 NOTE — RESULT ENCOUNTER NOTE
Your labs do show pre-diabetes. Working on diet changes (low carbohydrate) and increased exercise can help prevent the development of diabetes. If you are interested in starting any medications to help with weight loss and prevent the development of diabetes please make a follow up virtual visit.   The rest of your labs are stable.   Humera Garcia PA-C

## 2022-08-12 ENCOUNTER — MYC MEDICAL ADVICE (OUTPATIENT)
Dept: FAMILY MEDICINE | Facility: CLINIC | Age: 42
End: 2022-08-12

## 2022-08-12 LAB
BKR LAB AP GYN ADEQUACY: NORMAL
BKR LAB AP GYN INTERPRETATION: NORMAL
BKR LAB AP HPV REFLEX: NORMAL
BKR LAB AP PREVIOUS ABNORMAL: NORMAL
PATH REPORT.COMMENTS IMP SPEC: NORMAL
PATH REPORT.COMMENTS IMP SPEC: NORMAL
PATH REPORT.RELEVANT HX SPEC: NORMAL

## 2022-08-12 NOTE — TELEPHONE ENCOUNTER
Team this form was filled out this week. Do we still have a copy and can include the test dates?  Humera Garcia PA-C

## 2022-08-16 LAB
HUMAN PAPILLOMA VIRUS 16 DNA: NEGATIVE
HUMAN PAPILLOMA VIRUS 18 DNA: NEGATIVE
HUMAN PAPILLOMA VIRUS FINAL DIAGNOSIS: NORMAL
HUMAN PAPILLOMA VIRUS OTHER HR: NEGATIVE

## 2022-08-19 ENCOUNTER — ANCILLARY PROCEDURE (OUTPATIENT)
Dept: MAMMOGRAPHY | Facility: CLINIC | Age: 42
End: 2022-08-19
Attending: PHYSICIAN ASSISTANT
Payer: COMMERCIAL

## 2022-08-19 DIAGNOSIS — Z12.31 VISIT FOR SCREENING MAMMOGRAM: ICD-10-CM

## 2022-08-19 PROCEDURE — 77063 BREAST TOMOSYNTHESIS BI: CPT | Mod: TC | Performed by: RADIOLOGY

## 2022-08-19 PROCEDURE — 77067 SCR MAMMO BI INCL CAD: CPT | Mod: TC | Performed by: RADIOLOGY

## 2022-08-31 NOTE — LETTER
2018       RE: Rona Krishnamurthy  [address] [facility] [address]     Dear Colleague,    Thank you for referring your patient, Rona Krishnamurthy, to the [facility] at [facility]. Please see a copy of my visit note below.    37 year old female, , presents at 28 2/7 weeks for obstetric ultrasound assessment indicated by chronic hypertension(white coat syndrome).    Single fetus    Presentation - cephalic    USEGA = 29 3/7 weeks.  EFW = 1,367 grams, 74 % for 28 weeks.      BENEDICTO = 21.5cm.  FHR = 155bpm     Placenta anterior and grade 0    Comments: Appropriate interval growth    Findings discussed with patient.    Further studies: weekly BPPs to start at 32 weeks and continued serial growth ultrasounds.        REGGIE Gallardo MD  
n/a

## 2022-10-09 ENCOUNTER — HEALTH MAINTENANCE LETTER (OUTPATIENT)
Age: 42
End: 2022-10-09

## 2022-11-10 ENCOUNTER — NURSE TRIAGE (OUTPATIENT)
Dept: NURSING | Facility: CLINIC | Age: 42
End: 2022-11-10

## 2022-11-10 NOTE — TELEPHONE ENCOUNTER
"Patient calling stating she developed nasal discharge and \"very occasional cough\" yesterday.    Reporting temp 30 minutes ago 103.6 Tympanic when she left work and drove home.     Current temp 102.5 Tympanic now 30 minutes after Ibuprofen.     COVID 19 testing negative in past 20 minutes. Reporting child in home tested negative for influenza.    Denies any difficulty breathing.    Patient will recheck temp prior to next dose of fever reducer. Reviewed with patient for temp >103 to be seen in office now. Reviewed Urgent Care options.    Advised to call back with any increase or change in symptoms.     Shelby Coon RN  [facility]        Reason for Disposition    Colds with no complications    Additional Information    Negative: SEVERE difficulty breathing (e.g., struggling for each breath, speaks in single words)    Negative: Very weak (can't stand)    Negative: Sounds like a life-threatening emergency to the triager    Negative: Difficulty breathing and not from stuffy nose (e.g., not relieved by cleaning out the nose)    Negative: Runny nose is caused by pollen or other allergies    Negative: Cough is main symptom    Negative: Sore throat is main symptom    Negative: Patient sounds very sick or weak to the triager    Negative: Fever > 103 F (39.4 C)    Negative: Fever > 101 F (38.3 C) and over 60 years of age    Negative: Fever > 100.0 F (37.8 C) and has diabetes mellitus or a weak immune system (e.g., HIV positive, cancer chemotherapy, organ transplant, splenectomy, chronic steroids)    Negative: Fever > 100.0 F (37.8 C) and bedridden (e.g., nursing home patient, stroke, chronic illness, recovering from surgery)    Negative: Fever present > 3 days (72 hours)    Negative: Fever returns after gone for over 24 hours and symptoms worse or not improved    Negative: Sinus pain (not just congestion) and fever    Negative: Earache    Negative: Sinus congestion (pressure, fullness) present > 10 days    " Negative: Nasal discharge present > 10 days    Negative: Using nasal washes and pain medicine > 24 hours and sinus pain (lower forehead, cheekbone, or eye) persists    Negative: Patient wants to be seen    Negative: Sore throat present > 5 days    Protocols used: COMMON COLD-A-OH

## 2023-06-26 ASSESSMENT — ANXIETY QUESTIONNAIRES
5. BEING SO RESTLESS THAT IT IS HARD TO SIT STILL: NOT AT ALL
1. FEELING NERVOUS, ANXIOUS, OR ON EDGE: SEVERAL DAYS
2. NOT BEING ABLE TO STOP OR CONTROL WORRYING: NOT AT ALL
GAD7 TOTAL SCORE: 1
7. FEELING AFRAID AS IF SOMETHING AWFUL MIGHT HAPPEN: NOT AT ALL
GAD7 TOTAL SCORE: 1
8. IF YOU CHECKED OFF ANY PROBLEMS, HOW DIFFICULT HAVE THESE MADE IT FOR YOU TO DO YOUR WORK, TAKE CARE OF THINGS AT HOME, OR GET ALONG WITH OTHER PEOPLE?: NOT DIFFICULT AT ALL
7. FEELING AFRAID AS IF SOMETHING AWFUL MIGHT HAPPEN: NOT AT ALL
4. TROUBLE RELAXING: NOT AT ALL
6. BECOMING EASILY ANNOYED OR IRRITABLE: NOT AT ALL
3. WORRYING TOO MUCH ABOUT DIFFERENT THINGS: NOT AT ALL
IF YOU CHECKED OFF ANY PROBLEMS ON THIS QUESTIONNAIRE, HOW DIFFICULT HAVE THESE PROBLEMS MADE IT FOR YOU TO DO YOUR WORK, TAKE CARE OF THINGS AT HOME, OR GET ALONG WITH OTHER PEOPLE: NOT DIFFICULT AT ALL

## 2023-06-27 ENCOUNTER — OFFICE VISIT (OUTPATIENT)
Dept: OBGYN | Facility: CLINIC | Age: 43
End: 2023-06-27
Attending: OBSTETRICS & GYNECOLOGY
Payer: COMMERCIAL

## 2023-06-27 ENCOUNTER — LAB (OUTPATIENT)
Dept: LAB | Facility: CLINIC | Age: 43
End: 2023-06-27
Attending: OBSTETRICS & GYNECOLOGY
Payer: COMMERCIAL

## 2023-06-27 VITALS
SYSTOLIC BLOOD PRESSURE: 187 MMHG | BODY MASS INDEX: 28.71 KG/M2 | WEIGHT: 160 LBS | DIASTOLIC BLOOD PRESSURE: 76 MMHG | HEART RATE: 98 BPM

## 2023-06-27 DIAGNOSIS — N95.1 MENOPAUSAL SYNDROME (HOT FLASHES): Primary | ICD-10-CM

## 2023-06-27 DIAGNOSIS — N95.1 MENOPAUSAL SYNDROME (HOT FLASHES): ICD-10-CM

## 2023-06-27 LAB — TSH SERPL DL<=0.005 MIU/L-ACNC: 1.74 UIU/ML (ref 0.3–4.2)

## 2023-06-27 PROCEDURE — 36415 COLL VENOUS BLD VENIPUNCTURE: CPT

## 2023-06-27 PROCEDURE — 84443 ASSAY THYROID STIM HORMONE: CPT

## 2023-06-27 PROCEDURE — 99203 OFFICE O/P NEW LOW 30 MIN: CPT | Performed by: OBSTETRICS & GYNECOLOGY

## 2023-06-27 PROCEDURE — G0463 HOSPITAL OUTPT CLINIC VISIT: HCPCS | Performed by: OBSTETRICS & GYNECOLOGY

## 2023-06-27 ASSESSMENT — PAIN SCALES - GENERAL: PAINLEVEL: NO PAIN (0)

## 2023-06-27 NOTE — LETTER
6/27/2023       RE: Rona Krishnamurthy  [address] [facility] [address]     Dear Colleague,    Thank you for referring your patient, Rona Krishnamurthy, to the [facility] [place] at [facility]. Please see a copy of my visit note below.    Chief Complaint   Patient presents with    [facility]     B/P check   Tegan Conklin LPN  Answers for HPI/ROS submitted by the patient on 6/26/2023  ANJANA 7 TOTAL SCORE: 1        [facility]  Follow-Up Visit    S: Ms. Rona Krishnamurthy is a 42 year old patient with history of post-partum hypertension presenting for evaluation of hot flashes. For the past 1 month patient has been having intermittent hot flashes. She is having about 2 per night, awaking her from sleep. She is not drenched in sweat and is able to go back to sleep after removing the bed sheets. Otherwise she is having 3-4 flashes during the day. She recently had a hot flash in the context of a work meeting, which made her more anxious about these flashes. She has taken Black Cohash for the past 5 weeks and V[employer]ana Club for the past 30 days without improvement of symptoms. She notes taking her blood pressure more frequently recently. Recent home blood pressure readings include 135/86, 141/89, and 146/89 yesterday afternoon, 110/78 last night, and 125/84 this morning. She has been taking her usual dose of Lisinopril. She denies nausea, vomiting, abdominal pain, and headaches. She describes her mood as stable. She has been on Norethindrone since the birth of her daughter 5 years ago and is not currently sexually active. She does not smoke. She states she does not exercise regularly. She feels her diet and exercise are areas she could make changes in helping with HTN.     O:  BP (!) 187/76   Pulse 98   Wt 72.6 kg (160 lb)   BMI 28.71 kg/m     Vitals:    06/27/23 1324 06/27/23 1338 06/27/23 1339 06/27/23 1340   BP: (!) 149/85  (!) 157/89 (!) 187/76 (!) 187/76   Pulse: 98 98 98 98   Weight: 72.6 kg (160 lb)          A/P:  Ms. Rona Krishnamurthy is a 42 year old with hypertension here for evaluation and possible management of Hot Flashes.   - Hot Flashes  Risks and benefits of HRT in the context of hypertension discussed in detail with patient. While her blood pressure is elevated here today, it has been well controlled per home cuff. Recommended continued monitoring of blood pressure and sending the readings here for the next 2 weeks. If blood pressure continues to be well controlled, it would be reasonable to start low dose HRT with patch and daily progesterone. Discussed need for continued blood pressure monitoring daily for the subsequent 2 weeks. Patient is in agreement with this plan.   Additionally discussed non-pharmacologic interventions.     We discussed that HRT does have some risks of increased CV disease/stroke/clot.  Given her h/o HTN, this risk may be slightly higher, but tends to be mild risk if BP is well controlled.  Discussed that as long as she is willing to accept some increase risk, the benefits of improved sleep and fewer bothersome symptoms may be worth the risk to her. She agrees that she would like to do something as long as she is able to maintain reasonable BPs.     Ordered TSH level to rule out potential secondary causes of hot flashes.     -Elevated blood pressure  Patient will continue monitoring blood pressure and follow up with primary care provider if she needs greater control.       Malcolm Sanders  [employer], MS3  06/27/2023 2:10 PM     The above patient was seen and evaluated with the medical student who acted as my scribe for the above note. Agree with note, changes made as appropriate.    Crista Maynard MD

## 2023-06-27 NOTE — PROGRESS NOTES
[facility]  Follow-Up Visit    S: Ms. Rona Krishnamurthy is a 42 year old patient with history of post-partum hypertension presenting for evaluation of hot flashes. For the past 1 month patient has been having intermittent hot flashes. She is having about 2 per night, awaking her from sleep. She is not drenched in sweat and is able to go back to sleep after removing the bed sheets. Otherwise she is having 3-4 flashes during the day. She recently had a hot flash in the context of a work meeting, which made her more anxious about these flashes. She has taken Black Cohash for the past 5 weeks and [employer] for the past 30 days without improvement of symptoms. She notes taking her blood pressure more frequently recently. Recent home blood pressure readings include 135/86, 141/89, and 146/89 yesterday afternoon, 110/78 last night, and 125/84 this morning. She has been taking her usual dose of Lisinopril. She denies nausea, vomiting, abdominal pain, and headaches. She describes her mood as stable. She has been on Norethindrone since the birth of her daughter 5 years ago and is not currently sexually active. She does not smoke. She states she does not exercise regularly. She feels her diet and exercise are areas she could make changes in helping with HTN.     O:  BP (!) 187/76   Pulse 98   Wt 72.6 kg (160 lb)   BMI 28.71 kg/m     Vitals:    06/27/23 1324 06/27/23 1338 06/27/23 1339 06/27/23 1340   BP: (!) 149/85 (!) 157/89 (!) 187/76 (!) 187/76   Pulse: 98 98 98 98   Weight: 72.6 kg (160 lb)          A/P:  Ms. Rona Krishnamurthy is a 42 year old with hypertension here for evaluation and possible management of Hot Flashes.   - Hot Flashes  Risks and benefits of HRT in the context of hypertension discussed in detail with patient. While her blood pressure is elevated here today, it has been well controlled per home cuff. Recommended continued monitoring of blood pressure and sending the readings here for the next 2  weeks. If blood pressure continues to be well controlled, it would be reasonable to start low dose HRT with patch and daily progesterone. Discussed need for continued blood pressure monitoring daily for the subsequent 2 weeks. Patient is in agreement with this plan.   Additionally discussed non-pharmacologic interventions.     We discussed that HRT does have some risks of increased CV disease/stroke/clot.  Given her h/o HTN, this risk may be slightly higher, but tends to be mild risk if BP is well controlled.  Discussed that as long as she is willing to accept some increase risk, the benefits of improved sleep and fewer bothersome symptoms may be worth the risk to her. She agrees that she would like to do something as long as she is able to maintain reasonable BPs.     Ordered TSH level to rule out potential secondary causes of hot flashes.     -Elevated blood pressure  Patient will continue monitoring blood pressure and follow up with primary care provider if she needs greater control.       Malcolm Sanders  [employer] [employer], MS3  06/27/2023 2:10 PM     The above patient was seen and evaluated with the medical student who acted as my scribe for the above note. Agree with note, changes made as appropriate.    Crista Maynard MD

## 2023-06-27 NOTE — PROGRESS NOTES
Chief Complaint   Patient presents with     Establish Care     B/P check   Tegan Conklin LPN  Answers for HPI/ROS submitted by the patient on 6/26/2023  ANJANA 7 TOTAL SCORE: 1

## 2023-06-27 NOTE — PATIENT INSTRUCTIONS
Thank you for trusting us with your care!     If you need to contact us for questions about:  Symptoms, Scheduling & Medical Questions; Non-urgent (2-3 day response) Jason message, Urgent (needing response today) [phone] (if after 3:30pm next day response)   Prescriptions: Please call your Pharmacy   Billing: [facility] [phone] or [facility]:[phone]

## 2023-08-26 DIAGNOSIS — E87.6 HYPOKALEMIA: ICD-10-CM

## 2023-08-26 DIAGNOSIS — I10 BENIGN ESSENTIAL HYPERTENSION: ICD-10-CM

## 2023-08-28 RX ORDER — LISINOPRIL 20 MG/1
20 TABLET ORAL DAILY
Qty: 90 TABLET | Refills: 0 | Status: SHIPPED | OUTPATIENT
Start: 2023-08-28 | End: 2023-10-05

## 2023-09-08 ENCOUNTER — ANCILLARY PROCEDURE (OUTPATIENT)
Dept: MAMMOGRAPHY | Facility: CLINIC | Age: 43
End: 2023-09-08
Attending: PHYSICIAN ASSISTANT
Payer: COMMERCIAL

## 2023-09-08 DIAGNOSIS — Z12.31 VISIT FOR SCREENING MAMMOGRAM: ICD-10-CM

## 2023-09-08 PROCEDURE — 77067 SCR MAMMO BI INCL CAD: CPT | Mod: TC | Performed by: STUDENT IN AN ORGANIZED HEALTH CARE EDUCATION/TRAINING PROGRAM

## 2023-09-08 PROCEDURE — 77063 BREAST TOMOSYNTHESIS BI: CPT | Mod: TC | Performed by: STUDENT IN AN ORGANIZED HEALTH CARE EDUCATION/TRAINING PROGRAM

## 2023-09-24 DIAGNOSIS — Z30.09 GENERAL COUNSELING FOR PRESCRIPTION OF ORAL CONTRACEPTIVES: ICD-10-CM

## 2023-09-25 RX ORDER — ACETAMINOPHEN AND CODEINE PHOSPHATE 120; 12 MG/5ML; MG/5ML
0.35 SOLUTION ORAL DAILY
Qty: 84 TABLET | Refills: 0 | Status: SHIPPED | OUTPATIENT
Start: 2023-09-25 | End: 2023-10-05

## 2023-09-29 ASSESSMENT — ENCOUNTER SYMPTOMS
DIARRHEA: 0
SHORTNESS OF BREATH: 0
SORE THROAT: 0
NERVOUS/ANXIOUS: 0
HEADACHES: 0
ABDOMINAL PAIN: 0
JOINT SWELLING: 0
FEVER: 0
FREQUENCY: 0
EYE PAIN: 0
PARESTHESIAS: 0
NAUSEA: 0
WEAKNESS: 0
HEARTBURN: 0
HEMATOCHEZIA: 0
DIZZINESS: 0
COUGH: 0
MYALGIAS: 0
DYSURIA: 0
HEMATURIA: 0
BREAST MASS: 0
ARTHRALGIAS: 0
CONSTIPATION: 0
CHILLS: 0

## 2023-10-05 ENCOUNTER — OFFICE VISIT (OUTPATIENT)
Dept: FAMILY MEDICINE | Facility: CLINIC | Age: 43
End: 2023-10-05
Payer: COMMERCIAL

## 2023-10-05 VITALS
HEIGHT: 63 IN | SYSTOLIC BLOOD PRESSURE: 131 MMHG | BODY MASS INDEX: 28.99 KG/M2 | RESPIRATION RATE: 17 BRPM | OXYGEN SATURATION: 99 % | DIASTOLIC BLOOD PRESSURE: 87 MMHG | TEMPERATURE: 98.6 F | WEIGHT: 163.6 LBS | HEART RATE: 86 BPM

## 2023-10-05 DIAGNOSIS — I10 BENIGN ESSENTIAL HYPERTENSION: ICD-10-CM

## 2023-10-05 DIAGNOSIS — R73.09 ELEVATED GLUCOSE: ICD-10-CM

## 2023-10-05 DIAGNOSIS — Z00.00 ROUTINE GENERAL MEDICAL EXAMINATION AT A HEALTH CARE FACILITY: Primary | ICD-10-CM

## 2023-10-05 DIAGNOSIS — Z30.09 GENERAL COUNSELING FOR PRESCRIPTION OF ORAL CONTRACEPTIVES: ICD-10-CM

## 2023-10-05 LAB
ANION GAP SERPL CALCULATED.3IONS-SCNC: 11 MMOL/L (ref 7–15)
BUN SERPL-MCNC: 9.3 MG/DL (ref 6–20)
CALCIUM SERPL-MCNC: 9.7 MG/DL (ref 8.6–10)
CHLORIDE SERPL-SCNC: 101 MMOL/L (ref 98–107)
CHOLEST SERPL-MCNC: 189 MG/DL
CREAT SERPL-MCNC: 0.83 MG/DL (ref 0.51–0.95)
DEPRECATED HCO3 PLAS-SCNC: 26 MMOL/L (ref 22–29)
EGFRCR SERPLBLD CKD-EPI 2021: 89 ML/MIN/1.73M2
GLUCOSE SERPL-MCNC: 126 MG/DL (ref 70–99)
HBA1C MFR BLD: 5.9 % (ref 0–5.6)
HDLC SERPL-MCNC: 46 MG/DL
LDLC SERPL CALC-MCNC: 110 MG/DL
NONHDLC SERPL-MCNC: 143 MG/DL
POTASSIUM SERPL-SCNC: 4.1 MMOL/L (ref 3.4–5.3)
SODIUM SERPL-SCNC: 138 MMOL/L (ref 135–145)
TRIGL SERPL-MCNC: 166 MG/DL

## 2023-10-05 PROCEDURE — 99213 OFFICE O/P EST LOW 20 MIN: CPT | Mod: 25 | Performed by: PHYSICIAN ASSISTANT

## 2023-10-05 PROCEDURE — 80048 BASIC METABOLIC PNL TOTAL CA: CPT | Performed by: PHYSICIAN ASSISTANT

## 2023-10-05 PROCEDURE — 99396 PREV VISIT EST AGE 40-64: CPT | Performed by: PHYSICIAN ASSISTANT

## 2023-10-05 PROCEDURE — 83036 HEMOGLOBIN GLYCOSYLATED A1C: CPT | Performed by: PHYSICIAN ASSISTANT

## 2023-10-05 PROCEDURE — 36415 COLL VENOUS BLD VENIPUNCTURE: CPT | Performed by: PHYSICIAN ASSISTANT

## 2023-10-05 PROCEDURE — 80061 LIPID PANEL: CPT | Performed by: PHYSICIAN ASSISTANT

## 2023-10-05 RX ORDER — ACETAMINOPHEN AND CODEINE PHOSPHATE 120; 12 MG/5ML; MG/5ML
0.35 SOLUTION ORAL DAILY
Qty: 84 TABLET | Refills: 3 | Status: SHIPPED | OUTPATIENT
Start: 2023-10-05

## 2023-10-05 RX ORDER — LISINOPRIL 20 MG/1
20 TABLET ORAL DAILY
Qty: 90 TABLET | Refills: 3 | Status: SHIPPED | OUTPATIENT
Start: 2023-10-05

## 2023-10-05 ASSESSMENT — ENCOUNTER SYMPTOMS
FREQUENCY: 0
NAUSEA: 0
EYE PAIN: 0
CHILLS: 0
HEARTBURN: 0
SHORTNESS OF BREATH: 0
DYSURIA: 0
SORE THROAT: 0
ARTHRALGIAS: 0
COUGH: 0
PARESTHESIAS: 0
HEMATURIA: 0
CONSTIPATION: 0
JOINT SWELLING: 0
ABDOMINAL PAIN: 0
DIARRHEA: 0
DIZZINESS: 0
BREAST MASS: 0
FEVER: 0
NERVOUS/ANXIOUS: 0
HEADACHES: 0
WEAKNESS: 0
HEMATOCHEZIA: 0
MYALGIAS: 0

## 2023-10-05 NOTE — PROGRESS NOTES
SUBJECTIVE:   CC: Tiffanie is an 43 year old who presents for preventive health visit.       10/5/2023     8:52 AM   Additional Questions   Roomed by uri espinal       Healthy Habits:     Getting at least 3 servings of Calcium per day:  Yes    Bi-annual eye exam:  NO    Dental care twice a year:  Yes    Sleep apnea or symptoms of sleep apnea:  None    Diet:  Regular (no restrictions)    Frequency of exercise:  1 day/week    Duration of exercise:  15-30 minutes    Taking medications regularly:  Yes    Medication side effects:  None    Additional concerns today:  No            Have you ever done Advance Care Planning? (For example, a Health Directive, POLST, or a discussion with a medical provider or your loved ones about your wishes): Yes, advance care planning is on file.    Social History     Tobacco Use    Smoking status: Never    Smokeless tobacco: Never   Substance Use Topics    Alcohol use: No             2023     4:32 PM   Alcohol Use   Prescreen: >3 drinks/day or >7 drinks/week? No     Reviewed orders with patient.  Reviewed health maintenance and updated orders accordingly - Yes  Lab work is in process    Breast Cancer Screenin/2/2022     9:13 PM 2022     7:24 AM 2023     4:33 PM   Breast CA Risk Assessment (FHS-7)   Do you have a family history of breast, colon, or ovarian cancer? Yes Yes No / Unknown       click delete button to remove this line now  Mammogram Screening - Offered annual screening and updated Health Maintenance for mutual plan based on risk factor consideration  Pertinent mammograms are reviewed under the imaging tab.    History of abnormal Pap smear: NO - age 30-65 PAP every 5 years with negative HPV co-testing recommended      Latest Ref Rng & Units 2022     9:22 AM 10/3/2017     3:45 PM 10/3/2017     3:22 PM   PAP / HPV   PAP  Negative for Intraepithelial Lesion or Malignancy (NILM)      PAP (Historical)   NIL     HPV 16 DNA Negative Negative   Negative    HPV 18  "DNA Negative Negative   Negative    Other HR HPV Negative Negative   Negative      Reviewed and updated as needed this visit by clinical staff   Tobacco  Allergies  Meds              Reviewed and updated as needed this visit by Provider                     Review of Systems   Constitutional:  Negative for chills and fever.   HENT:  Negative for congestion, ear pain, hearing loss and sore throat.    Eyes:  Negative for pain and visual disturbance.   Respiratory:  Negative for cough and shortness of breath.    Cardiovascular:  Negative for chest pain and peripheral edema.   Gastrointestinal:  Negative for abdominal pain, constipation, diarrhea, heartburn, hematochezia and nausea.   Breasts:  Negative for tenderness, breast mass and discharge.   Genitourinary:  Negative for dysuria, frequency, genital sores, hematuria, pelvic pain, urgency, vaginal bleeding and vaginal discharge.   Musculoskeletal:  Negative for arthralgias, joint swelling and myalgias.   Skin:  Negative for rash.   Neurological:  Negative for dizziness, weakness, headaches and paresthesias.   Psychiatric/Behavioral:  Negative for mood changes. The patient is not nervous/anxious.           OBJECTIVE:   /87 (BP Location: Left arm, Patient Position: Chair, Cuff Size: Adult Regular)   Pulse 86   Temp 98.6  F (37  C) (Oral)   Resp 17   Ht 1.588 m (5' 2.5\")   Wt 74.2 kg (163 lb 9.6 oz)   LMP 09/19/2023 (Approximate)   SpO2 99%   BMI 29.45 kg/m    Physical Exam  GENERAL: healthy, alert and no distress  EYES: Eyes grossly normal to inspection, PERRL and conjunctivae and sclerae normal  HENT: ear canals and TM's normal, nose and mouth without ulcers or lesions  NECK: no adenopathy, no asymmetry, masses, or scars and thyroid normal to palpation  RESP: lungs clear to auscultation - no rales, rhonchi or wheezes  CV: regular rate and rhythm, normal S1 S2, no S3 or S4, no murmur, click or rub, no peripheral edema   ABDOMEN: soft, nontender, no " "hepatosplenomegaly, no masses and   MS: no gross musculoskeletal defects noted, no edema  SKIN: no suspicious lesions or rashes  NEURO: Normal strength and tone, mentation intact and speech normal  PSYCH: mentation appears normal, affect normal/bright        ASSESSMENT/PLAN:   (Z00.00) Routine general medical examination at a health care facility  (primary encounter diagnosis)  Comment:   Plan:     (I10) Benign essential hypertension  Comment:   Plan: lisinopril (ZESTRIL) 20 MG tablet, Lipid panel         reflex to direct LDL Fasting, Basic metabolic         panel        Stable, labs today. Refilled.     (Z30.09) General counseling for prescription of oral contraceptives  Comment:   Plan: norethindrone (MICRONOR) 0.35 MG tablet        Doing well refilled.     (R73.09) Elevated glucose  Comment:   Plan: Hemoglobin A1c        Needs recheck history of gestational DM          COUNSELING:  Reviewed preventive health counseling, as reflected in patient instructions       Regular exercise       Healthy diet/nutrition       Contraception      BMI:   Estimated body mass index is 29.45 kg/m  as calculated from the following:    Height as of this encounter: 1.588 m (5' 2.5\").    Weight as of this encounter: 74.2 kg (163 lb 9.6 oz).   Weight management plan: Discussed healthy diet and exercise guidelines      She reports that she has never smoked. She has never used smokeless tobacco.          BHUMI Mariano [facility]  "

## 2023-10-06 NOTE — RESULT ENCOUNTER NOTE
Your cholesterol and sugar levels have increased slightly. No need for medications at this time but working on diet and exercise changes can improve these numbers. Let me know if you have any questions.   Humera Garcia PA-C

## 2024-09-19 ENCOUNTER — ANCILLARY PROCEDURE (OUTPATIENT)
Dept: MAMMOGRAPHY | Facility: CLINIC | Age: 44
End: 2024-09-19
Attending: PHYSICIAN ASSISTANT
Payer: COMMERCIAL

## 2024-09-19 DIAGNOSIS — Z12.31 VISIT FOR SCREENING MAMMOGRAM: ICD-10-CM

## 2024-09-19 PROCEDURE — 77063 BREAST TOMOSYNTHESIS BI: CPT | Mod: TC | Performed by: RADIOLOGY

## 2024-09-19 PROCEDURE — 77067 SCR MAMMO BI INCL CAD: CPT | Mod: TC | Performed by: RADIOLOGY

## 2024-10-07 SDOH — HEALTH STABILITY: PHYSICAL HEALTH: ON AVERAGE, HOW MANY DAYS PER WEEK DO YOU ENGAGE IN MODERATE TO STRENUOUS EXERCISE (LIKE A BRISK WALK)?: 3 DAYS

## 2024-10-07 SDOH — HEALTH STABILITY: PHYSICAL HEALTH: ON AVERAGE, HOW MANY MINUTES DO YOU ENGAGE IN EXERCISE AT THIS LEVEL?: 30 MIN

## 2024-10-07 ASSESSMENT — SOCIAL DETERMINANTS OF HEALTH (SDOH): HOW OFTEN DO YOU GET TOGETHER WITH FRIENDS OR RELATIVES?: ONCE A WEEK

## 2024-10-08 ENCOUNTER — OFFICE VISIT (OUTPATIENT)
Dept: FAMILY MEDICINE | Facility: CLINIC | Age: 44
End: 2024-10-08
Payer: COMMERCIAL

## 2024-10-08 VITALS
HEART RATE: 79 BPM | DIASTOLIC BLOOD PRESSURE: 82 MMHG | RESPIRATION RATE: 19 BRPM | SYSTOLIC BLOOD PRESSURE: 120 MMHG | WEIGHT: 157 LBS | HEIGHT: 63 IN | BODY MASS INDEX: 27.82 KG/M2 | TEMPERATURE: 98.6 F | OXYGEN SATURATION: 99 %

## 2024-10-08 DIAGNOSIS — Z12.11 SCREEN FOR COLON CANCER: ICD-10-CM

## 2024-10-08 DIAGNOSIS — I10 BENIGN ESSENTIAL HYPERTENSION: ICD-10-CM

## 2024-10-08 DIAGNOSIS — Z13.220 SCREENING FOR HYPERLIPIDEMIA: ICD-10-CM

## 2024-10-08 DIAGNOSIS — R73.09 ELEVATED GLUCOSE: ICD-10-CM

## 2024-10-08 DIAGNOSIS — Z30.09 GENERAL COUNSELING FOR PRESCRIPTION OF ORAL CONTRACEPTIVES: ICD-10-CM

## 2024-10-08 DIAGNOSIS — Z00.00 ROUTINE GENERAL MEDICAL EXAMINATION AT A HEALTH CARE FACILITY: Primary | ICD-10-CM

## 2024-10-08 LAB
ANION GAP SERPL CALCULATED.3IONS-SCNC: 12 MMOL/L (ref 7–15)
BUN SERPL-MCNC: 9.8 MG/DL (ref 6–20)
CALCIUM SERPL-MCNC: 9.7 MG/DL (ref 8.8–10.4)
CHLORIDE SERPL-SCNC: 100 MMOL/L (ref 98–107)
CHOLEST SERPL-MCNC: 187 MG/DL
CREAT SERPL-MCNC: 0.78 MG/DL (ref 0.51–0.95)
EGFRCR SERPLBLD CKD-EPI 2021: >90 ML/MIN/1.73M2
EST. AVERAGE GLUCOSE BLD GHB EST-MCNC: 120 MG/DL
FASTING STATUS PATIENT QL REPORTED: NO
FASTING STATUS PATIENT QL REPORTED: NO
GLUCOSE SERPL-MCNC: 139 MG/DL (ref 70–99)
HBA1C MFR BLD: 5.8 % (ref 0–5.6)
HCO3 SERPL-SCNC: 26 MMOL/L (ref 22–29)
HDLC SERPL-MCNC: 53 MG/DL
LDLC SERPL CALC-MCNC: 114 MG/DL
NONHDLC SERPL-MCNC: 134 MG/DL
POTASSIUM SERPL-SCNC: 4.6 MMOL/L (ref 3.4–5.3)
SODIUM SERPL-SCNC: 138 MMOL/L (ref 135–145)
TRIGL SERPL-MCNC: 100 MG/DL

## 2024-10-08 PROCEDURE — 80048 BASIC METABOLIC PNL TOTAL CA: CPT | Performed by: PHYSICIAN ASSISTANT

## 2024-10-08 PROCEDURE — 83036 HEMOGLOBIN GLYCOSYLATED A1C: CPT | Performed by: PHYSICIAN ASSISTANT

## 2024-10-08 PROCEDURE — 36415 COLL VENOUS BLD VENIPUNCTURE: CPT | Performed by: PHYSICIAN ASSISTANT

## 2024-10-08 PROCEDURE — 99396 PREV VISIT EST AGE 40-64: CPT | Performed by: PHYSICIAN ASSISTANT

## 2024-10-08 PROCEDURE — 80061 LIPID PANEL: CPT | Performed by: PHYSICIAN ASSISTANT

## 2024-10-08 PROCEDURE — 99213 OFFICE O/P EST LOW 20 MIN: CPT | Mod: 25 | Performed by: PHYSICIAN ASSISTANT

## 2024-10-08 RX ORDER — LISINOPRIL 20 MG/1
20 TABLET ORAL DAILY
Qty: 90 TABLET | Refills: 3 | Status: SHIPPED | OUTPATIENT
Start: 2024-10-08 | End: 2024-10-11

## 2024-10-08 RX ORDER — ACETAMINOPHEN AND CODEINE PHOSPHATE 120; 12 MG/5ML; MG/5ML
0.35 SOLUTION ORAL DAILY
Qty: 84 TABLET | Refills: 3 | Status: SHIPPED | OUTPATIENT
Start: 2024-10-08 | End: 2024-10-11

## 2024-10-08 NOTE — PATIENT INSTRUCTIONS
Patient Education   Preventive Care Advice   This is general advice given by our system to help you stay healthy. However, your care team may have specific advice just for you. Please talk to your care team about your preventive care needs.  Nutrition  Eat 5 or more servings of fruits and vegetables each day.  Try wheat bread, brown rice and whole grain pasta (instead of white bread, rice, and pasta).  Get enough calcium and vitamin D. Check the label on foods and aim for 100% of the RDA (recommended daily allowance).  Lifestyle  Exercise at least 150 minutes each week  (30 minutes a day, 5 days a week).  Do muscle strengthening activities 2 days a week. These help control your weight and prevent disease.  No smoking.  Wear sunscreen to prevent skin cancer.  Have a dental exam and cleaning every 6 months.  Yearly exams  See your health care team every year to talk about:  Any changes in your health.  Any medicines your care team has prescribed.  Preventive care, family planning, and ways to prevent chronic diseases.  Shots (vaccines)   HPV shots (up to age 26), if you've never had them before.  Hepatitis B shots (up to age 59), if you've never had them before.  COVID-19 shot: Get this shot when it's due.  Flu shot: Get a flu shot every year.  Tetanus shot: Get a tetanus shot every 10 years.  Pneumococcal, hepatitis A, and RSV shots: Ask your care team if you need these based on your risk.  Shingles shot (for age 50 and up)  General health tests  Diabetes screening:  Starting at age 35, Get screened for diabetes at least every 3 years.  If you are younger than age 35, ask your care team if you should be screened for diabetes.  Cholesterol test: At age 39, start having a cholesterol test every 5 years, or more often if advised.  Bone density scan (DEXA): At age 50, ask your care team if you should have this scan for osteoporosis (brittle bones).  Hepatitis C: Get tested at least once in your life.  STIs (sexually  transmitted infections)  Before age 24: Ask your care team if you should be screened for STIs.  After age 24: Get screened for STIs if you're at risk. You are at risk for STIs (including HIV) if:  You are sexually active with more than one person.  You don't use condoms every time.  You or a partner was diagnosed with a sexually transmitted infection.  If you are at risk for HIV, ask about PrEP medicine to prevent HIV.  Get tested for HIV at least once in your life, whether you are at risk for HIV or not.  Cancer screening tests  Cervical cancer screening: If you have a cervix, begin getting regular cervical cancer screening tests starting at age 21.  Breast cancer scan (mammogram): If you've ever had breasts, begin having regular mammograms starting at age 40. This is a scan to check for breast cancer.  Colon cancer screening: It is important to start screening for colon cancer at age 45.  Have a colonoscopy test every 10 years (or more often if you're at risk) Or, ask your provider about stool tests like a FIT test every year or Cologuard test every 3 years.  To learn more about your testing options, visit:   .  For help making a decision, visit:   https://bit.ly/ck71028.  Prostate cancer screening test: If you have a prostate, ask your care team if a prostate cancer screening test (PSA) at age 55 is right for you.  Lung cancer screening: If you are a current or former smoker ages 50 to 80, ask your care team if ongoing lung cancer screenings are right for you.  For informational purposes only. Not to replace the advice of your health care provider. Copyright   2023 [employer]. All rights reserved. Clinically reviewed by the [facility]. [employer] [id number] - REV 01/24.

## 2024-10-08 NOTE — PROGRESS NOTES
"Preventive Care Visit  [facility] GAY Garcia PA-C, Family Medicine  Oct 8, 2024      Assessment & Plan     Routine general medical examination at a health care facility      Screen for colon cancer    - Colonoscopy Screening  Referral; Future    Benign essential hypertension  Stable. Labs today. Refilled.   - BASIC METABOLIC PANEL; Future  - lisinopril (ZESTRIL) 20 MG tablet; Take 1 tablet (20 mg) by mouth daily.  - BASIC METABOLIC PANEL    General counseling for prescription of oral contraceptives  Stable. Continue.   - norethindrone (MICRONOR) 0.35 MG tablet; Take 1 tablet (0.35 mg) by mouth daily.    Elevated glucose  Recheck.   - Hemoglobin A1c; Future  - Hemoglobin A1c    Screening for hyperlipidemia  - Lipid panel reflex to direct LDL Fasting; Future  - Lipid panel reflex to direct LDL Fasting            BMI  Estimated body mass index is 28.26 kg/m  as calculated from the following:    Height as of this encounter: 1.588 m (5' 2.5\").    Weight as of this encounter: 71.2 kg (157 lb).       Counseling  Appropriate preventive services were addressed with this patient via screening, questionnaire, or discussion as appropriate for fall prevention, nutrition, physical activity, Tobacco-use cessation, social engagement, weight loss and cognition.  Checklist reviewing preventive services available has been given to the patient.  Reviewed patient's diet, addressing concerns and/or questions.   She is at risk for lack of exercise and has been provided with information to increase physical activity for the benefit of her well-being.   The patient was instructed to see the dentist every 6 months.           Pop Moreno is a 44 year old, presenting for the following:  Physical and Health Maintenance        10/8/2024     8:48 AM   Additional Questions   Roomed by uri espinal        Health Care Directive  Patient does not have a Health Care Directive or Living Will: Discussed advance care " planning with patient; however, patient declined at this time.    HPI              10/7/2024   General Health   How would you rate your overall physical health? Good   Feel stress (tense, anxious, or unable to sleep) To some extent      (!) STRESS CONCERN      10/7/2024   Nutrition   Three or more servings of calcium each day? Yes   Diet: Regular (no restrictions)   How many servings of fruit and vegetables per day? (!) 2-3   How many sweetened beverages each day? (!) 3            10/7/2024   Exercise   Days per week of moderate/strenous exercise 3 days   Average minutes spent exercising at this level 30 min            10/7/2024   Social Factors   Frequency of gathering with friends or relatives Once a week   Worry food won't last until get money to buy more No   Food not last or not have enough money for food? No   Do you have housing? (Housing is defined as stable permanent housing and does not include staying ouside in a car, in a tent, in an abandoned building, in an overnight shelter, or couch-surfing.) Yes   Are you worried about losing your housing? No   Lack of transportation? No   Unable to get utilities (heat,electricity)? No            10/7/2024   Dental   Dentist two times every year? (!) NO            10/7/2024   TB Screening   Were you born outside of the [place]? No            Today's PHQ-2 Score:       10/7/2024     9:45 AM   PHQ-2 ( 1999 [employer])   Q1: Little interest or pleasure in doing things 0   Q2: Feeling down, depressed or hopeless 0   PHQ-2 Score 0   Q1: Little interest or pleasure in doing things Not at all   Q2: Feeling down, depressed or hopeless Not at all   PHQ-2 Score 0           10/7/2024   Substance Use   Alcohol more than 3/day or more than 7/wk No   Do you use any other substances recreationally? (!) DECLINE        Social History     Tobacco Use    Smoking status: Never     Passive exposure: Never    Smokeless tobacco: Never   Vaping Use    Vaping status: Never Used   Substance Use  "Topics    Alcohol use: No    Drug use: Not Currently     Types: Marijuana     Comment: Occasional- before pregnancy           9/19/2024   LAST FHS-7 RESULTS   1st degree relative breast or ovarian cancer No   Any relative bilateral breast cancer No   Any male have breast cancer No   Any ONE woman have BOTH breast AND ovarian cancer No   Any woman with breast cancer before 50yrs No   2 or more relatives with breast AND/OR ovarian cancer No   2 or more relatives with breast AND/OR bowel cancer No           Mammogram Screening - Mammogram every 1-2 years updated in Health Maintenance based on mutual decision making        10/7/2024   STI Screening   New sexual partner(s) since last STI/HIV test? No        History of abnormal Pap smear: No - age 30- 64 PAP with HPV every 5 years recommended        Latest Ref Rng & Units 8/9/2022     9:22 AM 10/3/2017     3:45 PM 10/3/2017     3:22 PM   PAP / HPV   PAP  Negative for Intraepithelial Lesion or Malignancy (NILM)      PAP (Historical)   NIL     HPV 16 DNA Negative Negative   Negative    HPV 18 DNA Negative Negative   Negative    Other HR HPV Negative Negative   Negative      ASCVD Risk   The 10-year ASCVD risk score (Chas KNAPP, et al., 2019) is: 1.2%    Values used to calculate the score:      Age: 44 years      Sex: Female      Is Non- : No      Diabetic: No      Tobacco smoker: No      Systolic Blood Pressure: 120 mmHg      Is BP treated: Yes      HDL Cholesterol: 46 mg/dL      Total Cholesterol: 189 mg/dL        10/7/2024   Contraception/Family Planning   Questions about contraception or family planning No           Reviewed and updated as needed this visit by Provider   Tobacco  Allergies  Meds  Problems  Med Hx  Surg Hx  Fam Hx                     Objective    Exam  /82   Pulse 79   Temp 98.6  F (37  C) (Temporal)   Resp 19   Ht 1.588 m (5' 2.5\")   Wt 71.2 kg (157 lb)   LMP  (LMP Unknown)   SpO2 99%   BMI 28.26 kg/m  " "   Estimated body mass index is 28.26 kg/m  as calculated from the following:    Height as of this encounter: 1.588 m (5' 2.5\").    Weight as of this encounter: 71.2 kg (157 lb).    Physical Exam  GENERAL: alert and no distress  EYES: Eyes grossly normal to inspection, PERRL and conjunctivae and sclerae normal  HENT: ear canals and TM's normal, nose and mouth without ulcers or lesions  NECK: no adenopathy, no asymmetry, masses, or scars  RESP: lungs clear to auscultation - no rales, rhonchi or wheezes  CV: regular rate and rhythm, normal S1 S2, no S3 or S4, no murmur, click or rub, no peripheral edema  ABDOMEN: soft, nontender, no hepatosplenomegaly, no masses   MS: no gross musculoskeletal defects noted, no edema  SKIN: no suspicious lesions or rashes  NEURO: Normal strength and tone, mentation intact and speech normal  PSYCH: mentation appears normal, affect normal/bright        Signed Electronically by: Humera Garcia PA-C    "

## 2024-10-08 NOTE — RESULT ENCOUNTER NOTE
The 10-year ASCVD risk score (Chas KNAPP, et al., 2019) is: 0.9%    Values used to calculate the score:      Age: 44 years      Sex: Female      Is Non- : No      Diabetic: No      Tobacco smoker: No      Systolic Blood Pressure: 120 mmHg      Is BP treated: Yes      HDL Cholesterol: 53 mg/dL      Total Cholesterol: 187 mg/dL    Tiffanie,     Your sugar is staying slightly elevated in the pre-diabetic range. Your cholesterol is stable.   We will keep checking these yearly.   Humera Garcia PA-C

## 2024-10-09 ENCOUNTER — MYC MEDICAL ADVICE (OUTPATIENT)
Dept: FAMILY MEDICINE | Facility: CLINIC | Age: 44
End: 2024-10-09
Payer: COMMERCIAL

## 2024-10-09 DIAGNOSIS — I10 BENIGN ESSENTIAL HYPERTENSION: ICD-10-CM

## 2024-10-09 DIAGNOSIS — Z30.09 GENERAL COUNSELING FOR PRESCRIPTION OF ORAL CONTRACEPTIVES: ICD-10-CM

## 2024-10-10 NOTE — TELEPHONE ENCOUNTER
Sent mychart for medication type pt would like transferred. RN placed new pharmacy on file.     Theresa Barrett RN on 10/10/2024 at 3:11 PM

## 2024-10-11 RX ORDER — LISINOPRIL 20 MG/1
20 TABLET ORAL DAILY
Qty: 90 TABLET | Refills: 2 | Status: SHIPPED | OUTPATIENT
Start: 2024-10-11

## 2024-10-11 RX ORDER — ACETAMINOPHEN AND CODEINE PHOSPHATE 120; 12 MG/5ML; MG/5ML
0.35 SOLUTION ORAL DAILY
Qty: 84 TABLET | Refills: 2 | Status: SHIPPED | OUTPATIENT
Start: 2024-10-11

## 2024-10-18 DIAGNOSIS — Z30.09 GENERAL COUNSELING FOR PRESCRIPTION OF ORAL CONTRACEPTIVES: ICD-10-CM

## 2024-10-18 DIAGNOSIS — I10 BENIGN ESSENTIAL HYPERTENSION: ICD-10-CM

## 2024-10-18 RX ORDER — LISINOPRIL 20 MG/1
20 TABLET ORAL DAILY
Qty: 90 TABLET | Refills: 0 | OUTPATIENT
Start: 2024-10-18

## 2024-10-18 RX ORDER — NORETHINDRONE 0.35 MG/1
0.35 TABLET ORAL DAILY
Qty: 84 TABLET | Refills: 0 | OUTPATIENT
Start: 2024-10-18

## 2024-10-24 ENCOUNTER — TELEPHONE (OUTPATIENT)
Dept: GASTROENTEROLOGY | Facility: CLINIC | Age: 44
End: 2024-10-24
Payer: COMMERCIAL

## 2024-10-24 NOTE — TELEPHONE ENCOUNTER
"Endoscopy Scheduling Screen    Have you had any respiratory illness or flu-like symptoms in the last 10 days?  No    What is your communication preference for Instructions and/or Bowel Prep?   MyChart    What insurance is in the chart?  Other:  [facility]    Ordering/Referring Provider:   PEDRO MELGAR        (If ordering provider performs procedure, schedule with ordering provider unless otherwise instructed. )    BMI: Estimated body mass index is 28.26 kg/m  as calculated from the following:    Height as of 10/8/24: 1.588 m (5' 2.5\").    Weight as of 10/8/24: 71.2 kg (157 lb).     Sedation Ordered  moderate sedation.   If patient BMI > 50 do not schedule in [facility].    If patient BMI > 45 do not schedule at [facility].    Are you taking methadone or Suboxone?  NO, No RN review required.    Have you been diagnosed and are being treated for severe PTSD or severe anxiety?  NO, No RN review required.    Are you taking any prescription medications for pain 3 or more times per week?   NO, No RN review required.    Do you have a history of malignant hyperthermia?  No    (Females) Are you currently pregnant?   No     Have you been diagnosed or told you have pulmonary hypertension?   No    Do you have an LVAD?  No    Have you been told you have moderate to severe sleep apnea?  No.    Have you been told you have COPD, asthma, or any other lung disease?  No    Do you have any heart conditions?  No     Have you ever had or are you waiting for an organ transplant?  No. Continue scheduling, no site restrictions.    Have you had a stroke or transient ischemic attack (TIA aka \"mini stroke\" in the last 6 months?   No    Have you been diagnosed with or been told you have cirrhosis of the liver?   No.    Are you currently on dialysis?   No    Do you need assistance transferring?   No    BMI: Estimated body mass index is 28.26 kg/m  as calculated from the following:    Height as of 10/8/24: 1.588 m (5' 2.5\").    Weight as of 10/8/24: 71.2 kg (157 " lb).     Is patients BMI > 40 and scheduling location [facility]?  No    Do you take an injectable or oral medication for weight loss or diabetes (excluding insulin)?  No    Do you take the medication Naltrexone?  No    Do you take blood thinners?  No       Prep   Are you currently on dialysis or do you have chronic kidney disease?  No    Do you have a diagnosis of diabetes?  No    Do you have a diagnosis of cystic fibrosis (CF)?  No    On a regular basis do you go 3 -5 days between bowel movements?  No    BMI > 40?  No    Preferred Pharmacy:    [id number] - [address]  [place] [address]  Phone: [phone] Fax: [phone]          Final Scheduling Details     Procedure scheduled  Colonoscopy    Surgeon:  Rabia     Date of procedure:  11/25     Pre-OP / PAC:   No - Not required for this site.    Location  [facility] - Patient preference.    Sedation   Moderate Sedation - Per order.      Patient Reminders:   You will receive a call from a Nurse to review instructions and health history.  This assessment must be completed prior to your procedure.  Failure to complete the Nurse assessment may result in the procedure being cancelled.      On the day of your procedure, please designate an adult(s) who can drive you home stay with you for the next 24 hours. The medicines used in the exam will make you sleepy. You will not be able to drive.      You cannot take public transportation, ride share services, or non-medical taxi service without a responsible caregiver.  Medical transport services are allowed with the requirement that a responsible caregiver will receive you at your destination.  We require that drivers and caregivers are confirmed prior to your procedure.

## 2024-11-12 ENCOUNTER — TELEPHONE (OUTPATIENT)
Dept: GASTROENTEROLOGY | Facility: CLINIC | Age: 44
End: 2024-11-12
Payer: COMMERCIAL

## 2024-11-12 NOTE — TELEPHONE ENCOUNTER
Pre visit planning completed.      Procedure details:    Patient scheduled for Colonoscopy on 11/25/24.     Arrival time: 0730. Procedure time 0815    Facility location: [facility]; [address], [place] [address]. Check in location: 2nd Floor at Surgery desk.    Sedation type: Conscious sedation     Pre op exam needed? No.    Indication for procedure: Screening       Chart review:     Electronic implanted devices? No    Recent diagnosis of diverticulitis within the last 6 weeks? No      Medication review:    Diabetic? No    Anticoagulants? No    Weight loss medication/injectable? No GLP-1 medication per patient's medication list. Nursing to verify with pre-assessment call.    Other medication HOLDING recommendations:  N/A      Prep for procedure:     Bowel prep recommendation: Standard Miralax  Due to: standard bowel prep.    Prep instructions sent via [employer] Mendez RN  Endoscopy Procedure Pre Assessment   [phone] option 2

## 2024-11-12 NOTE — TELEPHONE ENCOUNTER
First Attempt to contact patient in order to complete pre assessment questions.     Pt is unable to talk. Will call back     Callback required communication sent via [employer].      Imani Mendez RN  Endoscopy Procedure Pre Assessment

## 2024-11-14 NOTE — TELEPHONE ENCOUNTER
Pre assessment completed for upcoming procedure.   (Please see previous telephone encounter notes for complete details)    Patient  returned call.       Procedure details:    Arrival time and facility location reviewed.    Pre op exam needed? No.    Designated  policy reviewed. Instructed to have someone stay 6  hours post procedure.       Medication review:    Medications reviewed. Please see supporting documentation below. Holding recommendations discussed (if applicable).       Prep for procedure:     Procedure prep instructions reviewed.        Any additional information needed:  N/A      Patient  verbalized understanding and had no questions or concerns at this time.      Gabriella Diaz RN  Endoscopy Procedure Pre Assessment   [phone] option 2

## 2024-11-25 ENCOUNTER — HOSPITAL ENCOUNTER (OUTPATIENT)
Facility: AMBULATORY SURGERY CENTER | Age: 44
Discharge: HOME | End: 2024-11-25
Attending: INTERNAL MEDICINE | Admitting: INTERNAL MEDICINE
Payer: COMMERCIAL

## 2024-11-25 VITALS
RESPIRATION RATE: 16 BRPM | OXYGEN SATURATION: 100 % | HEART RATE: 63 BPM | TEMPERATURE: 98 F | SYSTOLIC BLOOD PRESSURE: 120 MMHG | DIASTOLIC BLOOD PRESSURE: 85 MMHG

## 2024-11-25 LAB — COLONOSCOPY: NORMAL

## 2024-11-25 PROCEDURE — 45378 DIAGNOSTIC COLONOSCOPY: CPT

## 2024-11-25 PROCEDURE — G8907 PT DOC NO EVENTS ON DISCHARG: HCPCS

## 2024-11-25 PROCEDURE — G8918 PT W/O PREOP ORDER IV AB PRO: HCPCS

## 2024-11-25 RX ORDER — NALOXONE HYDROCHLORIDE 0.4 MG/ML
0.4 INJECTION, SOLUTION INTRAMUSCULAR; INTRAVENOUS; SUBCUTANEOUS
Status: DISCONTINUED | OUTPATIENT
Start: 2024-11-25 | End: 2024-11-26 | Stop reason: HOSPADM

## 2024-11-25 RX ORDER — FENTANYL CITRATE 50 UG/ML
INJECTION, SOLUTION INTRAMUSCULAR; INTRAVENOUS PRN
Status: DISCONTINUED | OUTPATIENT
Start: 2024-11-25 | End: 2024-11-25 | Stop reason: HOSPADM

## 2024-11-25 RX ORDER — FLUMAZENIL 0.1 MG/ML
0.2 INJECTION, SOLUTION INTRAVENOUS
Status: ACTIVE | OUTPATIENT
Start: 2024-11-25 | End: 2024-11-25

## 2024-11-25 RX ORDER — ONDANSETRON 4 MG/1
4 TABLET, ORALLY DISINTEGRATING ORAL EVERY 6 HOURS PRN
Status: DISCONTINUED | OUTPATIENT
Start: 2024-11-25 | End: 2024-11-26 | Stop reason: HOSPADM

## 2024-11-25 RX ORDER — NALOXONE HYDROCHLORIDE 0.4 MG/ML
0.2 INJECTION, SOLUTION INTRAMUSCULAR; INTRAVENOUS; SUBCUTANEOUS
Status: DISCONTINUED | OUTPATIENT
Start: 2024-11-25 | End: 2024-11-26 | Stop reason: HOSPADM

## 2024-11-25 RX ORDER — PROCHLORPERAZINE MALEATE 10 MG
10 TABLET ORAL EVERY 6 HOURS PRN
Status: DISCONTINUED | OUTPATIENT
Start: 2024-11-25 | End: 2024-11-26 | Stop reason: HOSPADM

## 2024-11-25 RX ORDER — ONDANSETRON 2 MG/ML
4 INJECTION INTRAMUSCULAR; INTRAVENOUS
Status: DISCONTINUED | OUTPATIENT
Start: 2024-11-25 | End: 2024-11-26 | Stop reason: HOSPADM

## 2024-11-25 RX ORDER — ONDANSETRON 2 MG/ML
4 INJECTION INTRAMUSCULAR; INTRAVENOUS EVERY 6 HOURS PRN
Status: DISCONTINUED | OUTPATIENT
Start: 2024-11-25 | End: 2024-11-26 | Stop reason: HOSPADM

## 2024-11-25 RX ORDER — LIDOCAINE 40 MG/G
CREAM TOPICAL
Status: DISCONTINUED | OUTPATIENT
Start: 2024-11-25 | End: 2024-11-26 | Stop reason: HOSPADM

## 2024-11-25 NOTE — H&P
[facility] Anesthesia Pre-op History and Physical    Rona Krishnamurthy MRN# [id number]   Age: 44 year old YOB: 1980            Date of Exam 11/25/2024       Primary care provider: Humera Garcia         Chief Complaint and/or Reason for Procedure:     CRC screening - first colonoscopy         Active problem list:     Patient Active Problem List    Diagnosis Date Noted    Benign essential hypertension 08/09/2022     Priority: Medium            Medications (include herbals and vitamins):   Any Plavix use in the last 7 days? No     Current Outpatient Medications   Medication Sig Dispense Refill    lisinopril (ZESTRIL) 20 MG tablet Take 1 tablet (20 mg) by mouth daily. 90 tablet 2    Multiple Vitamin (MULTIVITAMIN ADULT PO)       norethindrone (MICRONOR) 0.35 MG tablet Take 1 tablet (0.35 mg) by mouth daily. 84 tablet 2    Nutritional Supplements (VITAMIN D BOOSTER PO)        Current Facility-Administered Medications   Medication Dose Route Frequency Provider Last Rate Last Admin    lidocaine (LMX4) kit   Topical Q1H PRN McBeath, Erin, DO        lidocaine 1 % 0.1-1 mL  0.1-1 mL Other Q1H PRN McBeath, Erin, DO        ondansetron (ZOFRAN) injection 4 mg  4 mg Intravenous Once PRN McBeath, Erin, DO        sodium chloride (PF) 0.9% PF flush 3 mL  3 mL Intracatheter Q8H McBeath, Erin, DO        sodium chloride (PF) 0.9% PF flush 3 mL  3 mL Intracatheter q1 min prn McBeath, Erin, DO                 Allergies:      Allergies   Allergen Reactions    Nkda [No Known Drug Allergy]      Allergy to Latex? No  Allergy to tape?   No  Intolerances:             Physical Exam:   All vitals have been reviewed  Patient Vitals for the past 8 hrs:   BP Temp Temp src Pulse Resp SpO2   11/25/24 0747 (!) 124/90 98  F (36.7  C) Temporal 82 16 98 %     No intake/output data recorded.  Lungs:   no increased work of breathing     Cardiovascular:   RRR             Lab / Radiology Results:            Anesthetic  risk and/or ASA classification:   1    Erin Camarillo, DO

## 2025-05-28 DIAGNOSIS — Z30.09 GENERAL COUNSELING FOR PRESCRIPTION OF ORAL CONTRACEPTIVES: ICD-10-CM

## 2025-05-28 RX ORDER — ACETAMINOPHEN AND CODEINE PHOSPHATE 120; 12 MG/5ML; MG/5ML
0.35 SOLUTION ORAL DAILY
Qty: 84 TABLET | Refills: 0 | Status: SHIPPED | OUTPATIENT
Start: 2025-05-28

## 2025-08-20 ENCOUNTER — PATIENT OUTREACH (OUTPATIENT)
Dept: CARE COORDINATION | Facility: CLINIC | Age: 45
End: 2025-08-20
Payer: COMMERCIAL

## 2025-08-20 DIAGNOSIS — Z30.09 GENERAL COUNSELING FOR PRESCRIPTION OF ORAL CONTRACEPTIVES: ICD-10-CM

## 2025-08-20 RX ORDER — NORETHINDRONE 0.35 MG/1
0.35 TABLET ORAL DAILY
Qty: 84 TABLET | Refills: 0 | Status: SHIPPED | OUTPATIENT
Start: 2025-08-20

## (undated) DEVICE — ESU GROUND PAD UNIVERSAL W/O CORD

## (undated) DEVICE — SU VICRYL 3-0 CTX 36" UND J980H

## (undated) DEVICE — SOL WATER IRRIG 1000ML BOTTLE 07139-09

## (undated) DEVICE — PREP CHLORAPREP 26ML TINTED ORANGE  260815

## (undated) DEVICE — SOL NACL 0.9% IRRIG 1000ML BOTTLE 07138-09

## (undated) DEVICE — DRSG STERI STRIP 1/4X3" R1541

## (undated) DEVICE — GLOVE ESTEEM POWDER FREE SMT 6.5  2D72PT65

## (undated) DEVICE — SU VICRYL 0 CT-1 36" J346H

## (undated) DEVICE — KIT ENDO FIRST STEP DISINFECTANT 200ML W/POUCH EP-4

## (undated) DEVICE — CATH TRAY FOLEY 16FR SILICONE 907416

## (undated) DEVICE — STRAP KNEE/BODY 31143004

## (undated) DEVICE — SU MONOCRYL 4-0 PS-2 18" UND Y496G

## (undated) DEVICE — PAD CHUX UNDERPAD 23X24" 7136

## (undated) DEVICE — GLOVE PROTEXIS BLUE W/NEU-THERA 7.0  2D73EB70

## (undated) DEVICE — BASIN SET MAJOR

## (undated) DEVICE — SUCTION CANISTER MEDIVAC LINER 1500ML W/LID 65651-515

## (undated) DEVICE — PACK C-SECTION LF PL15OTA83B

## (undated) DEVICE — STOCKING SLEEVE COMPRESSION CALF LG

## (undated) DEVICE — SU MONOCRYL 0 CT-1 36" Y346H

## (undated) RX ORDER — OXYTOCIN/0.9 % SODIUM CHLORIDE 30/500 ML
PLASTIC BAG, INJECTION (ML) INTRAVENOUS
Status: DISPENSED
Start: 2018-07-29

## (undated) RX ORDER — ONDANSETRON 2 MG/ML
INJECTION INTRAMUSCULAR; INTRAVENOUS
Status: DISPENSED
Start: 2018-07-29

## (undated) RX ORDER — KETOROLAC TROMETHAMINE 30 MG/ML
INJECTION, SOLUTION INTRAMUSCULAR; INTRAVENOUS
Status: DISPENSED
Start: 2018-07-29

## (undated) RX ORDER — FENTANYL CITRATE 50 UG/ML
INJECTION, SOLUTION INTRAMUSCULAR; INTRAVENOUS
Status: DISPENSED
Start: 2018-07-29

## (undated) RX ORDER — LIDOCAINE HYDROCHLORIDE 10 MG/ML
INJECTION, SOLUTION EPIDURAL; INFILTRATION; INTRACAUDAL; PERINEURAL
Status: DISPENSED
Start: 2017-10-30

## (undated) RX ORDER — PHENYLEPHRINE HCL IN 0.9% NACL 1 MG/10 ML
SYRINGE (ML) INTRAVENOUS
Status: DISPENSED
Start: 2018-07-29

## (undated) RX ORDER — FENTANYL CITRATE 50 UG/ML
INJECTION, SOLUTION INTRAMUSCULAR; INTRAVENOUS
Status: DISPENSED
Start: 2024-11-25